# Patient Record
Sex: MALE | Race: WHITE | Employment: OTHER | URBAN - METROPOLITAN AREA
[De-identification: names, ages, dates, MRNs, and addresses within clinical notes are randomized per-mention and may not be internally consistent; named-entity substitution may affect disease eponyms.]

---

## 2017-01-03 ENCOUNTER — HOSPITAL ENCOUNTER (OUTPATIENT)
Dept: PHYSICAL THERAPY | Age: 77
Discharge: HOME | End: 2017-01-03
Payer: MEDICARE

## 2017-01-03 PROCEDURE — 97112 NEUROMUSCULAR REEDUCATION: CPT

## 2017-01-03 PROCEDURE — 97110 THERAPEUTIC EXERCISES: CPT

## 2017-01-03 NOTE — PROGRESS NOTES
PT DAILY TREATMENT NOTE - [facility]     Patient Name: Brian Moody  IBQL:[id number]  : 1940  [x]  Patient  Verified  Payor: [place] MEDICARE / Plan: [place] MEDICARE PART A & B / Product Type: Medicare /    In time:930  Out time:  Total Treatment Time (min): 33  Total Timed Codes (min): 33  1:1 Treatment Time ( only): 33   Visit #: 3 of 12    Treatment Area: Cervical myelopathy [G95.9]    SUBJECTIVE  Pain Level (0-10 scale): 4/10  Any medication changes, allergies to medications, adverse drug reactions, diagnosis change, or new procedure performed?: [x] No    [] Yes (see summary sheet for update)  Subjective functional status/changes:   [x] No changes reported      OBJECTIVE    Modality rationale:    Min Type Additional Details    [] Estim:  []Unatt       []IFC  []Premod                        []Other:  []w/ice   []w/heat  Position:  Location:    [] Estim: []Att    []TENS instruct  []NMES                    []Other:  []w/US   []w/ice   []w/heat  Position:  Location:    []  Traction: [] Cervical       []Lumbar                       [] Prone          []Supine                       []Intermittent   []Continuous Lbs:  [] before manual  [] after manual    []  Ultrasound: []Continuous   [] Pulsed                           []1MHz   []3MHz W/cm2:  Location:    []  Iontophoresis with dexamethasone         Location: [] Take home patch   [] In clinic    []  Ice     []  heat  []  Ice massage  []  Laser   []  Anodyne Position:  Location:    []  Laser with stim  []  Other:  Position:  Location:    []  Vasopneumatic Device Pressure:       [] lo [] med [] hi   Temperature: [] lo [] med [] hi   [] Skin assessment post-treatment:  []intact []redness- no adverse reaction    []redness  adverse reaction:      min []Eval                  []Re-Eval       23 min Therapeutic Exercise:  [x] See flow sheet :   Rationale: increase ROM and increase strength to improve the patients ability to improve ADL's     min Therapeutic Activity:  []  See flow sheet :        10 min Neuromuscular Re-education:  [x]  See flow sheet :standing marching, Patricioerg EO   Rationale: increase strength, improve coordination, improve balance and increase proprioception  to improve the patients ability to improve balance and safety with gait and ADL's     min Manual Therapy:          min Gait Training:  ___ feet with ___ device on level surfaces with ___ level of assist   Rationale: With   [] TE   [] TA   [] neuro   [] other: Patient Education: [x] Review HEP    [] Progressed/Changed HEP based on:   [] positioning   [] body mechanics   [] transfers   [] heat/ice application    [] other:      Other Objective/Functional Measures: HEP reviewed to promote carryover     Pain Level (0-10 scale) post treatment: 0/10    ASSESSMENT/Changes in Function: Pt ambulating with ataxic gait using rollator with decreased DF on right foot. Patient will continue to benefit from skilled PT services to modify and progress therapeutic interventions, address functional mobility deficits, address ROM deficits, address strength deficits, analyze and address soft tissue restrictions, analyze and cue movement patterns, analyze and modify body mechanics/ergonomics, assess and modify postural abnormalities, address imbalance/dizziness and instruct in home and community integration to attain remaining goals. [x]  See Plan of Care  []  See progress note/recertification  []  See Discharge Summary         Progress towards goals / Updated goals:  Short Term Goals: To be accomplished in 2 weeks:  1. Patient will be independent and compliant with HEP to achieve other goals. Status at [facility]: not independent/compliant; handout issued  Current: reviewed today    2. Patient will ambulate 100' with least restrictive AD, modified independently.   Status at [facility]: ambulates ~50' with [facility] and CG/min assist; educated on recommendation for ww  Current: pt ambulating using rollator with ataxic gait      Long Term Goals: To be accomplished in 4 weeks:  1. Improve FOTO score to >/= 59/100 to indicate decreased pain with ADL's. Status at eval: 46/100  Current: retest every 5 visits  2. Pt will increase Tena Balance assessment to 30/56 in order to demonstrate decreased risk for fall with ambulation. Status at eval: 24/56  Current: no change    3. Pt will decrease TUG to 20 seconds in order to promote decreased risk for fall with ambulation. Status at eval: 27 seconds with SPC and supervision  Current: no change       PLAN  [x]  Upgrade activities as tolerated     [x]  Continue plan of care  []  Update interventions per flow sheet       []  Discharge due to:_  [x]  Other:_Assess HEP next visit.       David Dawson, LIO 1/3/2017  9:37 AM    Future Appointments  Date Time Provider Caprice Leal   1/5/2017 3:30 PM AMADOR Stiles [facility]   1/9/2017 11:00 AM THE [facility] AMADOR MILAN [facility]   1/11/2017 1:00 PM AMADOR Stiles THE [facility]

## 2017-01-05 ENCOUNTER — HOSPITAL ENCOUNTER (OUTPATIENT)
Dept: PHYSICAL THERAPY | Age: 77
Discharge: HOME | End: 2017-01-05
Payer: MEDICARE

## 2017-01-05 PROCEDURE — 97112 NEUROMUSCULAR REEDUCATION: CPT

## 2017-01-05 PROCEDURE — 97110 THERAPEUTIC EXERCISES: CPT

## 2017-01-05 NOTE — PROGRESS NOTES
PT DAILY TREATMENT NOTE - [facility]     Patient Name: Radha Spine  Date:2017  : 1940  [x]  Patient  Verified  Payor: VA MEDICARE / Plan: VA MEDICARE PART A & B / Product Type: Medicare /    In time:3:27  Out time:4:10  Total Treatment Time (min): 43  Total Timed Codes (min): 43  1:1 Treatment Time ( [address] only): 37   Visit #: 4 of 12    Treatment Area: Cervical myelopathy [G95.9]    SUBJECTIVE  Pain Level (0-10 scale): 4/10  Any medication changes, allergies to medications, adverse drug reactions, diagnosis change, or new procedure performed?: [x] No    [] Yes (see summary sheet for update)  Subjective functional status/changes:   [] No changes reported  The pt reports that he is having more difficulty walking around today than usual.    OBJECTIVE    Modality rationale:    Min Type Additional Details    [] Estim:  []Unatt       []IFC  []Premod                        []Other:  []w/ice   []w/heat  Position:  Location:    [] Estim: []Att    []TENS instruct  []NMES                    []Other:  []w/US   []w/ice   []w/heat  Position:  Location:    []  Traction: [] Cervical       []Lumbar                       [] Prone          []Supine                       []Intermittent   []Continuous Lbs:  [] before manual  [] after manual    []  Ultrasound: []Continuous   [] Pulsed                           []1MHz   []3MHz W/cm2:  Location:    []  Iontophoresis with dexamethasone         Location: [] Take home patch   [] In clinic    []  Ice     []  heat  []  Ice massage  []  Laser   []  Anodyne Position:  Location:    []  Laser with stim  []  Other:  Position:  Location:    []  Vasopneumatic Device Pressure:       [] lo [] med [] hi   Temperature: [] lo [] med [] hi   [] Skin assessment post-treatment:  []intact []redness- no adverse reaction    []redness  adverse reaction:      min []Eval                  []Re-Eval       33 min Therapeutic Exercise:  [x] See flow sheet :   Rationale: increase ROM and increase strength to improve the patients ability to walk     min Therapeutic Activity:  []  See flow sheet :   Rationale:      10 min Neuromuscular Re-education:  [x]  See flow sheet :   Rationale: improve coordination, improve balance and increase proprioception  to improve the patients ability to stand and walk safely     min Manual Therapy:     Rationale:      min Gait Training:  ___ feet with ___ device on level surfaces with ___ level of assist   Rationale: With   [] TE   [] TA   [] neuro   [] other: Patient Education: [x] Review HEP    [] Progressed/Changed HEP based on:   [] positioning   [] body mechanics   [] transfers   [] heat/ice application    [] other:      Other Objective/Functional Measures:      Pain Level (0-10 scale) post treatment: 3-4/10    ASSESSMENT/Changes in Function: The pt didn't get any pain relief from exercises but nothing increased his pain either. The pt was safe when moving around today but has difficulty controlling his leg movements and has a slow speed. He should continue to work on strengthening and movement control. Patient will continue to benefit from skilled PT services to modify and progress therapeutic interventions, address functional mobility deficits, address ROM deficits, address strength deficits, analyze and address soft tissue restrictions, analyze and cue movement patterns, analyze and modify body mechanics/ergonomics, assess and modify postural abnormalities, address imbalance/dizziness and instruct in home and community integration to attain remaining goals.      [x]  See Plan of Care  []  See progress note/recertification  []  See Discharge Summary         PLAN  [x]  Upgrade activities as tolerated     [x]  Continue plan of care  []  Update interventions per flow sheet       []  Discharge due to:_  []  Other:_      Michael Chen, PT 1/5/2017  3:34 PM    Future Appointments  Date Time Provider Caprice Leal   1/9/2017 11:00 AM Edgardo Cook[facility]   1/11/2017 1:00 PM Arsenio Gonsalez [facility]   1/16/2017 11:30 AM [facility]OR Gonsalez [facility]   1/19/2017 10:00 AM AMADOR Gonsalez [facility]

## 2017-01-09 ENCOUNTER — APPOINTMENT (OUTPATIENT)
Dept: PHYSICAL THERAPY | Age: 77
End: 2017-01-09
Payer: MEDICARE

## 2017-01-11 ENCOUNTER — HOSPITAL ENCOUNTER (OUTPATIENT)
Dept: PHYSICAL THERAPY | Age: 77
Discharge: HOME | End: 2017-01-11
Payer: MEDICARE

## 2017-01-11 PROCEDURE — 97110 THERAPEUTIC EXERCISES: CPT

## 2017-01-11 PROCEDURE — 97112 NEUROMUSCULAR REEDUCATION: CPT

## 2017-01-11 NOTE — PROGRESS NOTES
PT DAILY TREATMENT NOTE - [facility]     Patient Name: Malinda Anaya  Date:2017  : 1940  [x]  Patient  Verified  Payor: VA MEDICARE / Plan: VA MEDICARE PART A & B / Product Type: Medicare /    In time:12:57  Out time:1:44  Total Treatment Time (min): 47  Total Timed Codes (min): 47  1:1 Treatment Time ([facility] only): 52   Visit #: 5 of 12    Treatment Area: Cervical myelopathy [G95.9]    SUBJECTIVE  Pain Level (0-10 scale): 310  Any medication changes, allergies to medications, adverse drug reactions, diagnosis change, or new procedure performed?: [x] No    [] Yes (see summary sheet for update)  Subjective functional status/changes:   [x] No changes reported      OBJECTIVE    Modality rationale:    Min Type Additional Details    [] Estim:  []Unatt       []IFC  []Premod                        []Other:  []w/ice   []w/heat  Position:  Location:    [] Estim: []Att    []TENS instruct  []NMES                    []Other:  []w/US   []w/ice   []w/heat  Position:  Location:    []  Traction: [] Cervical       []Lumbar                       [] Prone          []Supine                       []Intermittent   []Continuous Lbs:  [] before manual  [] after manual    []  Ultrasound: []Continuous   [] Pulsed                           []1MHz   []3MHz W/cm2:  Location:    []  Iontophoresis with dexamethasone         Location: [] Take home patch   [] In clinic    []  Ice     []  heat  []  Ice massage  []  Laser   []  Anodyne Position:  Location:    []  Laser with stim  []  Other:  Position:  Location:    []  Vasopneumatic Device Pressure:       [] lo [] med [] hi   Temperature: [] lo [] med [] hi   [] Skin assessment post-treatment:  []intact []redness- no adverse reaction    []redness  adverse reaction:      min []Eval                  []Re-Eval       26 min Therapeutic Exercise:  [x] See flow sheet :   Rationale: increase ROM and increase strength to improve the patients ability to walk     min Therapeutic Activity:  [] See flow sheet :   Rationale:      13 min Neuromuscular Re-education:  [x]  See flow sheet :   Rationale: improve coordination, improve balance and increase proprioception  to improve the patients ability to stand and walk     min Manual Therapy:     Rationale:      min Gait Training:  ___ feet with ___ device on level surfaces with ___ level of assist   Rationale: With   [] TE   [] TA   [] neuro   [] other: Patient Education: [x] Review HEP    [] Progressed/Changed HEP based on:   [] positioning   [] body mechanics   [] transfers   [] heat/ice application    [] other:      Other Objective/Functional Measures: see goal review     Pain Level (0-10 scale) post treatment: 2/10    ASSESSMENT/Changes in Function: The pt did not do as well with the TUG today as he did initially. He still has significant difficulty with gait due to weakness, decreased balance and ataxia. Patient will continue to benefit from skilled PT services to modify and progress therapeutic interventions, address functional mobility deficits, address ROM deficits, address strength deficits, analyze and address soft tissue restrictions, analyze and cue movement patterns, analyze and modify body mechanics/ergonomics, assess and modify postural abnormalities, address imbalance/dizziness and instruct in home and community integration to attain remaining goals. [x]  See Plan of Care  []  See progress note/recertification  []  See Discharge Summary         Progress towards goals / Updated goals:  Short Term Goals: To be accomplished in 2 weeks:  1. Patient will be independent and compliant with HEP to achieve other goals. Status at [facility]: not independent/compliant; handout issued  Current: reviewed today     2. Patient will ambulate 100' with least restrictive AD, modified independently.   Status at [facility]: ambulates ~50' with [facility] and CG/min assist; educated on recommendation for ww  Current: Not met, pt still using rollator for most mobility      Long Term Goals: To be accomplished in 4 weeks:  1. Improve FOTO score to >/= 59/100 to indicate decreased pain with ADL's. Status at eval: 46/100  Current: Not met, 39    2. Pt will increase Tena Balance assessment to 30/56 in order to demonstrate decreased risk for fall with ambulation. Status at eval: 24/56  Current: not tested     3. Pt will decrease TUG to 20 seconds in order to promote decreased risk for fall with ambulation.   Status at eval: 27 seconds with SPC and supervision  Current: 37 seconds with SPC and supervision    PLAN  [x]  Upgrade activities as tolerated     [x]  Continue plan of care  []  Update interventions per flow sheet       []  Discharge due to:_  []  Other:_      Fabienne Garcia, PT 1/11/2017  1:32 PM    Future Appointments  Date Time Provider Caprice Leal   1/12/2017 2:00 PM Marii Centeno, PTA [facility]   1/16/2017 11:30 AM Fabienne Garcia, PT [facility]   1/19/2017 10:00 AM Fabienne Garcia, PT [facility]

## 2017-01-12 ENCOUNTER — HOSPITAL ENCOUNTER (OUTPATIENT)
Dept: PHYSICAL THERAPY | Age: 77
Discharge: HOME | End: 2017-01-12
Payer: MEDICARE

## 2017-01-12 PROCEDURE — 97110 THERAPEUTIC EXERCISES: CPT

## 2017-01-12 PROCEDURE — 97112 NEUROMUSCULAR REEDUCATION: CPT

## 2017-01-12 NOTE — PROGRESS NOTES
PT DAILY TREATMENT NOTE - [facility]     Patient Name: Mary Ann Madrigal  Date:2017  : 1940  [x]  Patient  Verified  Payor: VA MEDICARE / Plan: VA MEDICARE PART A & B / Product Type: Medicare /    In time:1400  Out time:1443  Total Treatment Time (min): 43  Total Timed Codes (min): 43  1:1 Treatment Time ( [address] only): 37   Visit #: 6 of 12    Treatment Area: Cervical myelopathy [G95.9]    SUBJECTIVE  Pain Level (0-10 scale): 3/10 right leg  Any medication changes, allergies to medications, adverse drug reactions, diagnosis change, or new procedure performed?: [x] No    [] Yes (see summary sheet for update)  Subjective functional status/changes:   [x] No changes reported      OBJECTIVE    Modality rationale:    Min Type Additional Details    [] Estim:  []Unatt       []IFC  []Premod                        []Other:  []w/ice   []w/heat  Position:  Location:    [] Estim: []Att    []TENS instruct  []NMES                    []Other:  []w/US   []w/ice   []w/heat  Position:  Location:    []  Traction: [] Cervical       []Lumbar                       [] Prone          []Supine                       []Intermittent   []Continuous Lbs:  [] before manual  [] after manual    []  Ultrasound: []Continuous   [] Pulsed                           []1MHz   []3MHz W/cm2:  Location:    []  Iontophoresis with dexamethasone         Location: [] Take home patch   [] In clinic    []  Ice     []  heat  []  Ice massage  []  Laser   []  Anodyne Position:  Location:    []  Laser with stim  []  Other:  Position:  Location:    []  Vasopneumatic Device Pressure:       [] lo [] med [] hi   Temperature: [] lo [] med [] hi   [] Skin assessment post-treatment:  []intact []redness- no adverse reaction    []redness  adverse reaction:      min []Eval                  []Re-Eval       31 min Therapeutic Exercise:  [x] See flow sheet :   Rationale: increase ROM and increase strength to improve the patients ability to improve gait and ADL's     min Therapeutic Activity:  []  See flow sheet :        12 min Neuromuscular Re-education:  [x]  See flow sheet :static and dynamic balance   Rationale: increase strength, improve coordination, improve balance and increase proprioception  to improve the patients ability to improve balance and safety with gait and ADL's     min Manual Therapy:          min Gait Training:  ___ feet with ___ device on level surfaces with ___ level of assist   Rationale: With   [] TE   [] TA   [] neuro   [] other: Patient Education: [x] Review HEP    [] Progressed/Changed HEP based on:   [] positioning   [] body mechanics   [] transfers   [] heat/ice application    [] other:      Other Objective/Functional Measures:      Pain Level (0-10 scale) post treatment: 0/10    ASSESSMENT/Changes in Function: Pt ambulating using rollator with limited DF on right foot. DF does improve with cueing in clinic. Patient will continue to benefit from skilled PT services to modify and progress therapeutic interventions, address functional mobility deficits, address ROM deficits, address strength deficits, analyze and address soft tissue restrictions, analyze and cue movement patterns, analyze and modify body mechanics/ergonomics, assess and modify postural abnormalities, address imbalance/dizziness and instruct in home and community integration to attain remaining goals.      [x]  See Plan of Care  []  See progress note/recertification  []  See Discharge Summary           PLAN  [x]  Upgrade activities as tolerated     [x]  Continue plan of care  []  Update interventions per flow sheet       []  Discharge due to:_  []  Other:_      Flor Mcelroy PTA 1/12/2017  1:59 PM    Future Appointments  Date Time Provider Caprice Leal   1/12/2017 2:00 PM Flor Mcelroy PTA [facility]   1/16/2017 11:30 AM AMADOR Carroll [facility]   1/19/2017 10:00 AM AMADOR Carroll [facility]

## 2017-01-16 ENCOUNTER — HOSPITAL ENCOUNTER (OUTPATIENT)
Dept: PHYSICAL THERAPY | Age: 77
Discharge: HOME | End: 2017-01-16
Payer: MEDICARE

## 2017-01-16 PROCEDURE — 97110 THERAPEUTIC EXERCISES: CPT

## 2017-01-16 PROCEDURE — 97112 NEUROMUSCULAR REEDUCATION: CPT

## 2017-01-16 NOTE — PROGRESS NOTES
PT DAILY TREATMENT NOTE - [facility]     Patient Name: Mer Kauffman  Date:2017  : 1940  [x]  Patient  Verified  Payor: Bhargav English / Plan: [place] MEDICARE PART A & B / Product Type: Medicare /    In time:1050  Out time:1130  Total Treatment Time (min): 40  Total Timed Codes (min): 40  1:1 Treatment Time ([address] only): 40   Visit #: 7 of 12    Treatment Area: Cervical myelopathy [G95.9]    SUBJECTIVE  Pain Level (0-10 scale): 810  Any medication changes, allergies to medications, adverse drug reactions, diagnosis change, or new procedure performed?: [x] No    [] Yes (see summary sheet for update)  Subjective functional status/changes:   [] No changes reported  Terrible today. Hurts in my hips.     OBJECTIVE    Modality rationale:    Min Type Additional Details    [] Estim:  []Unatt       []IFC  []Premod                        []Other:  []w/ice   []w/heat  Position:  Location:    [] Estim: []Att    []TENS instruct  []NMES                    []Other:  []w/US   []w/ice   []w/heat  Position:  Location:    []  Traction: [] Cervical       []Lumbar                       [] Prone          []Supine                       []Intermittent   []Continuous Lbs:  [] before manual  [] after manual    []  Ultrasound: []Continuous   [] Pulsed                           []1MHz   []3MHz W/cm2:  Location:    []  Iontophoresis with dexamethasone         Location: [] Take home patch   [] In clinic    []  Ice     []  heat  []  Ice massage  []  Laser   []  Anodyne Position:  Location:    []  Laser with stim  []  Other:  Position:  Location:    []  Vasopneumatic Device Pressure:       [] lo [] med [] hi   Temperature: [] lo [] med [] hi   [] Skin assessment post-treatment:  []intact []redness- no adverse reaction    []redness  adverse reaction:      min []Eval                  []Re-Eval       30 min Therapeutic Exercise:  [x] See flow sheet :   Rationale: increase ROM and increase strength to improve the patients ability to normalize ADL's     min Therapeutic Activity:  []  See flow sheet :        10 min Neuromuscular Re-education:  [x]  See flow sheet :   Rationale: improve coordination, improve balance and increase proprioception  to improve the patients ability to normalize gait and decrease fall risk     min Manual Therapy:          min Gait Training:  ___ feet with ___ device on level surfaces with ___ level of assist   Rationale: With   [] TE   [] TA   [] neuro   [] other: Patient Education: [x] Review HEP    [] Progressed/Changed HEP based on:   [] positioning   [] body mechanics   [] transfers   [] heat/ice application    [] other:      Other Objective/Functional Measures: none taken today     Pain Level (0-10 scale) post treatment: 3/10    ASSESSMENT/Changes in Function: Pt unable to maintain Rhomberg position with EC for any length of time. He loses his balance posteriorly requiring assist to correct. Patient will continue to benefit from skilled PT services to modify and progress therapeutic interventions, address functional mobility deficits, address ROM deficits, address strength deficits, analyze and address soft tissue restrictions, analyze and cue movement patterns, analyze and modify body mechanics/ergonomics, assess and modify postural abnormalities and address imbalance/dizziness to attain remaining goals.      [x]  See Plan of Care  []  See progress note/recertification  []  See Discharge Summary          PLAN  [x]  Upgrade activities as tolerated     [x]  Continue plan of care  []  Update interventions per flow sheet       []  Discharge due to:_  [x]  Other:progress note next visit to continue PT    Teresa Dias PT 1/16/2017  11:59 AM    Future Appointments  Date Time Provider Caprice Leal   1/16/2017 12:00 PM AMADOR [facility]   1/19/2017 10:00 AM AMADOR Zuñiga [facility]

## 2017-01-19 ENCOUNTER — HOSPITAL ENCOUNTER (OUTPATIENT)
Dept: PHYSICAL THERAPY | Age: 77
Discharge: HOME | End: 2017-01-19
Payer: MEDICARE

## 2017-01-19 PROCEDURE — 97110 THERAPEUTIC EXERCISES: CPT

## 2017-01-19 PROCEDURE — G8978 MOBILITY CURRENT STATUS: HCPCS

## 2017-01-19 PROCEDURE — G8979 MOBILITY GOAL STATUS: HCPCS

## 2017-01-19 PROCEDURE — 97112 NEUROMUSCULAR REEDUCATION: CPT

## 2017-01-19 NOTE — PROGRESS NOTES
[facility] at [address] [place] [address]  Phone: [phone]   Fax: [phone]    Continued Plan of Care/ Re-certification for Physical Therapy Services    Patient name: Katlyn Allison Start of Care: 2016   Referral source: Kevin Alfonso MD : 1940   Medical/Treatment Diagnosis: Cervical myelopathy [G95.9] Onset Date:Summer 2016     Prior Hospitalization: see medical history Provider#: [id number]   Medications: Verified on Patient Summary List    Comorbidities: TIA's [id number], arthritis, HTN, GERD, lumbar stenosis L4-L5-S1; L TKA   Prior Level of Function: independent ambulation with SPC, chronic weakness related to previous TIA's and L TKA limiting long distances. Visits from Start of Care: 8    Missed Visits: 0    The Plan of Care and following information is based on the patient's current status:  Short Term Goals: To be accomplished in 2 weeks:  1. Patient will be independent and compliant with HEP to achieve other goals. Status at eval: not independent/compliant; handout issued  Current: Met      2. Patient will ambulate 100' with least restrictive AD, modified independently. Status at eval: ambulates ~50' with SPC and CG/min assist; educated on recommendation for ww  Current: Not met, pt still using rollator for most mobility      Long Term Goals: To be accomplished in 4 weeks:  1. Improve FOTO score to >/= 59/100 to indicate decreased pain with ADL's. Status at eval: 46/100  Current: Not met, 39     2. Pt will increase Tena Balance assessment to 30/56 in order to demonstrate decreased risk for fall with ambulation. Status at eval: 24/56  Current: Met, 30/56      3. Pt will decrease TUG to 20 seconds in order to promote decreased risk for fall with ambulation.   Status at eval: 27 seconds with SPC and supervision  Current: 31 seconds with SPC and supervision    Key functional changes: pt is able to lift his foot a little bit better, he hasn't stumbled lately      Problems/ barriers to goal attainment: multiple comorbidities     Problem List: pain affecting function, decrease ROM, decrease strength, impaired gait/ balance, decrease ADL/ functional abilitiies, decrease activity tolerance, decrease flexibility/ joint mobility and decrease transfer abilities    Treatment Plan: Therapeutic exercise, Therapeutic activities, Neuromuscular re-education, Physical agent/modality, Gait/balance training, Manual therapy, Aquatic therapy, Patient education and Functional mobility training     Patient Goal (s) has been updated and includes: \"get that foot up when walking\"     Goals for this certification period to be accomplished in 8 treatments:  Improve FOTO score to >/= 59/100 to indicate decreased pain with ADL's. Status at eval: 66/546  Status at recert: Not met, 39     Pt will decrease TUG to 20 seconds in order to promote decreased risk for fall with ambulation. Status at eval: 27 seconds with SPC and supervision  Status at recert: 31 seconds with SPC and supervision    The pt will be able to maintain modified tandem stance bilaterally for at least 15 seconds to improve his safety with bathing and household chores. Status at eval: unable to hold  Status at recert: only able to maintain for 5 seconds    Frequency / Duration: Patient to be seen 2 times per week for 4 weeks:    Assessment / Recommendations: The pt still struggles with mobility tasks due to weakness, ataxia and decreased balance. He has made some gains with balance since starting therapy though as demonstrated by his improved Tena Balance Score. He would benefit from continued therapy to further strengthening activities and to progress balance activities to improve his safety with mobility tasks and movement patterns for ADLs.     G-Codes (GP)  Mobility  [id number] Current  CL= 60-79%  [id number] Goal  CK= 40-59%    The severity rating is based on clinical judgment and the [facility] score.    Certification Period: 1/19/2017 to 2/17/2017    Elizabeth Rubi, PT 1/19/2017 10:17 AM    ________________________________________________________________________  I certify that the above Therapy Services are being furnished while the patient is under my care. I agree with the treatment plan and certify that this therapy is necessary. [] I have read the above and request that my patient continue as recommended.   [] I have read the above report and request that my patient continue therapy with the following changes/special instructions: ______________________________________  [] I have read the above report and request that my patient be discharged from therapy    Physician's Signature:_______________________________Date:___________Time:__________    Please sign and return to   [facility] at [address]  Phone: [phone]   Fax: [phone]

## 2017-01-19 NOTE — PROGRESS NOTES
PT DAILY TREATMENT NOTE - [facility]     Patient Name: Jayde Abreu  Date:2017  : 1940  [x]  Patient  Verified  Payor: Suzanna Mcadams / Plan: [place] MED[place] / Product Type: Medicare /    In time:10:00  Out time:10:48  Total Treatment Time (min): 48  Total Timed Codes (min): 48  1:1 Treatment Time ( [address] only): 45   Visit #: 8 of 12    Treatment Area: Cervical myelopathy [G95.9]    SUBJECTIVE  Pain Level (0-10 scale): 6/10  Any medication changes, allergies to medications, adverse drug reactions, diagnosis change, or new procedure performed?: [x] No    [] Yes (see summary sheet for update)  Subjective functional status/changes:   [] No changes reported  The pt reports that he does think he can lift his leg a little better now than when he started therapy.     OBJECTIVE    Modality rationale:    Min Type Additional Details    [] Estim:  []Unatt       []IFC  []Premod                        []Other:  []w/ice   []w/heat  Position:  Location:    [] Estim: []Att    []TENS instruct  []NMES                    []Other:  []w/US   []w/ice   []w/heat  Position:  Location:    []  Traction: [] Cervical       []Lumbar                       [] Prone          []Supine                       []Intermittent   []Continuous Lbs:  [] before manual  [] after manual    []  Ultrasound: []Continuous   [] Pulsed                           []1MHz   []3MHz W/cm2:  Location:    []  Iontophoresis with dexamethasone         Location: [] Take home patch   [] In clinic    []  Ice     []  heat  []  Ice massage  []  Laser   []  Anodyne Position:  Location:    []  Laser with stim  []  Other:  Position:  Location:    []  Vasopneumatic Device Pressure:       [] lo [] med [] hi   Temperature: [] lo [] med [] hi   [] Skin assessment post-treatment:  []intact []redness- no adverse reaction    []redness  adverse reaction:      min []Eval                  []Re-Eval       15 min Therapeutic Exercise:  [x] See flow sheet :   Rationale: increase ROM and increase strength to improve the patients ability to walk     min Therapeutic Activity:  []  See flow sheet :   Rationale:      33 min Neuromuscular Re-education:  [x]  See flow sheet : TUG and Tena balance retesting   Rationale: improve coordination, improve balance and increase proprioception  to improve the patients ability to safely perform mobility tasks     min Manual Therapy:     Rationale:      min Gait Training:  ___ feet with ___ device on level surfaces with ___ level of assist   Rationale: With   [] TE   [] TA   [] neuro   [] other: Patient Education: [x] Review HEP    [] Progressed/Changed HEP based on:   [] positioning   [] body mechanics   [] transfers   [] heat/ice application    [] other:      Other Objective/Functional Measures: see recert     Pain Level (0-10 scale) post treatment: 3/10    ASSESSMENT/Changes in Function: see recert    Patient will continue to benefit from skilled PT services to modify and progress therapeutic interventions, address functional mobility deficits, address ROM deficits, address strength deficits, analyze and address soft tissue restrictions, analyze and cue movement patterns, analyze and modify body mechanics/ergonomics, assess and modify postural abnormalities, address imbalance/dizziness and instruct in home and community integration to attain remaining goals. []  See Plan of Care  [x]  See progress note/recertification  []  See Discharge Summary         Progress towards goals / Updated goals:  See recert    PLAN  [x]  Upgrade activities as tolerated     [x]  Continue plan of care  []  Update interventions per flow sheet       []  Discharge due to:_  []  Other:_      Freddie Sauceda, PT 1/19/2017  10:16 AM    No future appointments.

## 2017-01-24 ENCOUNTER — HOSPITAL ENCOUNTER (OUTPATIENT)
Dept: PHYSICAL THERAPY | Age: 77
Discharge: HOME | End: 2017-01-24
Payer: MEDICARE

## 2017-01-24 PROCEDURE — 97110 THERAPEUTIC EXERCISES: CPT

## 2017-01-24 NOTE — PROGRESS NOTES
PT DAILY TREATMENT NOTE - [facility]     Patient Name: Krystal De Guzman  Date:2017  : 1940  [x]  Patient  Verified  Payor: Rama Beth / Plan: [place] MEDICARE PART A & B / Product Type: Medicare /    In time:1:27  Out time:2:05  Total Treatment Time (min): 38  Total Timed Codes (min): 38  1:1 Treatment Time ( [address] only): 25  Visit #: 9 of 16    Treatment Area: Cervical myelopathy [G95.9]    SUBJECTIVE  Pain Level (0-10 scale): 5  Any medication changes, allergies to medications, adverse drug reactions, diagnosis change, or new procedure performed?: [x] No    [] Yes (see summary sheet for update)  Subjective functional status/changes:   [] No changes reported  No changes reported today. OBJECTIVE    36 min Therapeutic Exercise:  [x] See flow sheet :   Rationale: increase ROM and increase strength to improve the patients ability to tolerate ADLs     2 min Neuromuscular Re-education:  [x]  See flow sheet : balance activities   Rationale: increase strength, improve coordination, improve balance and increase proprioception  to improve the patients ability to ambulate more safely        With   [] TE   [] TA   [] neuro   [] other: Patient Education: [x] Review HEP    [] Progressed/Changed HEP based on:   [] positioning   [] body mechanics   [] transfers   [] heat/ice application    [] other:      Other Objective/Functional Measures: Performed HR/TR in standing, added yellow tband to standing hip flex and ABD, added 2# weight for LAQ on B LEs to improve strength in the LEs. Challenged with TR in standing secondary to weakness in the LEs and pain in the low back. CGA needed for rhomberg EO on floor. CGA-min assist needed for rhomberg EC on floor and needed min assist from therapist at times to regain balance during the activity. SBA needed for all other standing exercises. Pain Level (0-10 scale) post treatment: 4    ASSESSMENT/Changes in Function: Forward trunk lean noted with standing exercises today.  Mild decrease in pain reported at the end of the session. Pt knocked over cameron several times with both feet during step over cameron exercise secondary to noted decreased hip and knee flex ROM during the activity (also used 2 UE support during activity). Pt used B UEs from plinth to help with sit to/from stand transfer from the plinth. Continue POC as tolerated. Patient will continue to benefit from skilled PT services to modify and progress therapeutic interventions, address functional mobility deficits, address ROM deficits, address strength deficits, analyze and address soft tissue restrictions, analyze and cue movement patterns, analyze and modify body mechanics/ergonomics, address imbalance/dizziness and instruct in home and community integration to attain remaining goals. []  See Plan of Care  []  See progress note/recertification  []  See Discharge Summary         Progress towards goals / Updated goals:  Goals for this certification period to be accomplished in 8 treatments:  Improve FOTO score to >/= 59/100 to indicate decreased pain with ADL's. Status at eval: 88/233  Status at recert: Not met, 39      Pt will decrease TUG to 20 seconds in order to promote decreased risk for fall with ambulation. Status at eval: 27 seconds with SPC and supervision  Status at recert: 31 seconds with SPC and supervision     The pt will be able to maintain modified tandem stance bilaterally for at least 15 seconds to improve his safety with bathing and household chores.   Status at eval: unable to hold  Status at recert: only able to maintain for 5 seconds    PLAN  [x]  Upgrade activities as tolerated     [x]  Continue plan of care  [x]  Update interventions per flow sheet       []  Discharge due to:_  []  Other:_      Ran Chavez PT 1/24/2017  1:50 PM    Future Appointments  Date Time Provider Caprice Leal   1/24/2017 1:30 PM AMADOR Torres [facility]   1/27/2017 10:00 AM AMADOR Kelsey [facility] 2/1/2017 11:00 AM Emily Pan PT [facility]   2/3/2017 10:30 AM Aniyah Benavides PT [facility]   2/6/2017 12:00 PM Eimly Pan, PT [facility]   2/8/2017 1:30 PM Sergey Rubi PT [facility]   2/13/2017 10:30 AM Aniyah Benavides PT [facility]   2/15/2017 1:00 PM Jose M Cardenas PT [facility]

## 2017-01-27 ENCOUNTER — HOSPITAL ENCOUNTER (OUTPATIENT)
Dept: PHYSICAL THERAPY | Age: 77
Discharge: HOME | End: 2017-01-27
Payer: MEDICARE

## 2017-01-27 PROCEDURE — 97110 THERAPEUTIC EXERCISES: CPT

## 2017-01-27 NOTE — PROGRESS NOTES
PT DAILY TREATMENT NOTE - [facility]     Patient Name: Emily Reddy  Date:2017  : 1940  [x]  Patient  Verified  Payor: Leon Mustafa / Plan: [place] MEDICARE PART A & B / Product Type: Medicare /    In time:1000  Out time:1043  Total Treatment Time (min): 43  Total Timed Codes (min): 43  1:1 Treatment Time ( [address] only): 37   Visit #: 10 of 16    Treatment Area: Cervical myelopathy [G95.9]    SUBJECTIVE  Pain Level (0-10 scale): 2/10  Any medication changes, allergies to medications, adverse drug reactions, diagnosis change, or new procedure performed?: [x] No    [] Yes (see summary sheet for update)  Subjective functional status/changes:   [] No changes reported  My hips are tight.     OBJECTIVE    Modality rationale:    Min Type Additional Details    [] Estim:  []Unatt       []IFC  []Premod                        []Other:  []w/ice   []w/heat  Position:  Location:    [] Estim: []Att    []TENS instruct  []NMES                    []Other:  []w/US   []w/ice   []w/heat  Position:  Location:    []  Traction: [] Cervical       []Lumbar                       [] Prone          []Supine                       []Intermittent   []Continuous Lbs:  [] before manual  [] after manual    []  Ultrasound: []Continuous   [] Pulsed                           []1MHz   []3MHz W/cm2:  Location:    []  Iontophoresis with dexamethasone         Location: [] Take home patch   [] In clinic    []  Ice     []  heat  []  Ice massage  []  Laser   []  Anodyne Position:  Location:    []  Laser with stim  []  Other:  Position:  Location:    []  Vasopneumatic Device Pressure:       [] lo [] med [] hi   Temperature: [] lo [] med [] hi   [] Skin assessment post-treatment:  []intact []redness- no adverse reaction    []redness  adverse reaction:      min []Eval                  []Re-Eval       43 min Therapeutic Exercise:  [x] See flow sheet :   Rationale: increase strength, improve coordination, improve balance and increase proprioception to improve the patients ability to normalize gait and function     min Therapeutic Activity:  []  See flow sheet :         min Neuromuscular Re-education:  [x]  See flow sheet :        min Manual Therapy:          min Gait Training:  ___ feet with ___ device on level surfaces with ___ level of assist   Rationale: With   [] TE   [] TA   [] neuro   [] other: Patient Education: [x] Review HEP    [] Progressed/Changed HEP based on:   [] positioning   [] body mechanics   [] transfers   [] heat/ice application    [] other:      Other Objective/Functional Measures:   FOTO score: 35/100  TUG score: 28 sec with 4WW    Pain Level (0-10 scale) post treatment: 0/10    ASSESSMENT/Changes in Function: FOTO score continues to regress. No improvement in TUG score. Static standing balance (modified tandem) slowly improving. Patient will continue to benefit from skilled PT services to modify and progress therapeutic interventions, address functional mobility deficits, address ROM deficits, address strength deficits, analyze and address soft tissue restrictions, analyze and cue movement patterns, assess and modify postural abnormalities and address imbalance/dizziness to attain remaining goals. []  See Plan of Care  []  See progress note/recertification  []  See Discharge Summary         Progress towards goals / Updated goals:  Goals for this certification period to be accomplished in 8 treatments:  Improve FOTO score to >/= 59/100 to indicate decreased pain with ADL's. Status at eval: 04/742  Status at recert: Not met, 39  Current Status: not met: 35/100      Pt will decrease TUG to 20 seconds in order to promote decreased risk for fall with ambulation.   Status at eval: 27 seconds with SPC and supervision  Status at recert: 31 seconds with SPC and supervision  Current Status: not met: 28 seconds with 4WW and supervision      The pt will be able to maintain modified tandem stance bilaterally for at least 15 seconds to improve his safety with bathing and household chores.   Status at eval: unable to hold  Status at recert: only able to maintain for 5 seconds  Current Status: progressing: 10 seconds bilaterally    PLAN  [x]  Upgrade activities as tolerated     [x]  Continue plan of care  []  Update interventions per flow sheet       []  Discharge due to:_  []  Other:_      Elizabeth Alvarez PT 1/27/2017  9:58 AM    Future Appointments  Date Time Provider Caprice Leal   1/27/2017 10:00 AM AMADOR Stokes [facility]   2/1/2017 11:00 AM Elizabeth Alvarez PT [facility]   2/3/2017 10:30 AM AMADOR Stokes [facility]   2/6/2017 12:00 PM Elizabeth Alvarez PT [facility]   2/8/2017 1:30 PM AMADOR Alas [facility]   2/13/2017 10:30 AM AMADOR StokesHPANGELINE [facility]   2/15/2017 1:00 PM AMADOR WilkinsonHPANGELINE [facility]

## 2017-02-01 ENCOUNTER — APPOINTMENT (OUTPATIENT)
Dept: PHYSICAL THERAPY | Age: 77
End: 2017-02-01
Payer: MEDICARE

## 2017-02-03 ENCOUNTER — HOSPITAL ENCOUNTER (OUTPATIENT)
Dept: PHYSICAL THERAPY | Age: 77
Discharge: HOME | End: 2017-02-03
Payer: MEDICARE

## 2017-02-03 PROCEDURE — 97110 THERAPEUTIC EXERCISES: CPT

## 2017-02-03 PROCEDURE — G8979 MOBILITY GOAL STATUS: HCPCS

## 2017-02-03 PROCEDURE — G8980 MOBILITY D/C STATUS: HCPCS

## 2017-02-03 NOTE — PROGRESS NOTES
PT DAILY TREATMENT NOTE - [facility]     Patient Name: Mathieu Hogan  Date:2/3/2017  : 1940  [x]  Patient  Verified  Payor: [place] MEDICARE / Plan: [place] MEDICARE PART A & B / Product Type: Medicare /    In time:1025  Out time:1100  Total Treatment Time (min): 35  Total Timed Codes (min): 35  1:1 Treatment Time ( [address] only): 25   Visit #: 11 of 16    Treatment Area: Cervical myelopathy [G95.9]    SUBJECTIVE  Pain Level (0-10 scale): 3/10  Any medication changes, allergies to medications, adverse drug reactions, diagnosis change, or new procedure performed?: [x] No    [] Yes (see summary sheet for update)  Subjective functional status/changes:   [] No changes reported  My hips are hurting me. My doctor is going to send me to another orthopedic for a second opinion. I don't care for the one I have.     OBJECTIVE    Modality rationale:    Min Type Additional Details    [] Estim:  []Unatt       []IFC  []Premod                        []Other:  []w/ice   []w/heat  Position:  Location:    [] Estim: []Att    []TENS instruct  []NMES                    []Other:  []w/US   []w/ice   []w/heat  Position:  Location:    []  Traction: [] Cervical       []Lumbar                       [] Prone          []Supine                       []Intermittent   []Continuous Lbs:  [] before manual  [] after manual    []  Ultrasound: []Continuous   [] Pulsed                           []1MHz   []3MHz W/cm2:  Location:    []  Iontophoresis with dexamethasone         Location: [] Take home patch   [] In clinic    []  Ice     []  heat  []  Ice massage  []  Laser   []  Anodyne Position:  Location:    []  Laser with stim  []  Other:  Position:  Location:    []  Vasopneumatic Device Pressure:       [] lo [] med [] hi   Temperature: [] lo [] med [] hi   [] Skin assessment post-treatment:  []intact []redness- no adverse reaction    []redness  adverse reaction:      min []Eval                  []Re-Eval       35 min Therapeutic Exercise:  [x] See flow sheet :   Rationale: increase ROM, increase strength, improve balance and increase proprioception to improve the patients ability to normalize gait and function     min Therapeutic Activity:  []  See flow sheet :         min Neuromuscular Re-education:  []  See flow sheet :        min Manual Therapy:          min Gait Training:  ___ feet with ___ device on level surfaces with ___ level of assist   Rationale: With   [] TE   [] TA   [] neuro   [] other: Patient Education: [x] Review HEP    [] Progressed/Changed HEP based on:   [] positioning   [] body mechanics   [] transfers   [] heat/ice application    [] other:      Other Objective/Functional Measures: see goal review     Pain Level (0-10 scale) post treatment: 1/10    ASSESSMENT/Changes in Function: Pt continues to c/o bilateral hip pain with standing activity. Pain at least in part could be coming from his back (spinal stenosis). He is to be scheduled for a second opinion. Patient will continue to benefit from skilled PT services to modify and progress therapeutic interventions, address functional mobility deficits, address ROM deficits, address strength deficits, analyze and address soft tissue restrictions, analyze and cue movement patterns, analyze and modify body mechanics/ergonomics, assess and modify postural abnormalities and address imbalance/dizziness to attain remaining goals. []  See Plan of Care  []  See progress note/recertification  []  See Discharge Summary    Goals for this certification period to be accomplished in 8 treatments:  Improve FOTO score to >/= 59/100 to indicate decreased pain with ADL's. Status at eval: 48/582  Status at recert: Not met, 39  Current Status: not met: 35/100      Pt will decrease TUG to 20 seconds in order to promote decreased risk for fall with ambulation.   Status at eval: 27 seconds with SPC and supervision  Status at recert: 31 seconds with SPC and supervision  Current Status: not met: 28 seconds with 7XN and supervision      The pt will be able to maintain modified tandem stance bilaterally for at least 15 seconds to improve his safety with bathing and household chores.   Status at eval: unable to hold  Status at recert: only able to maintain for 5 seconds  Current Status: progressing: 10 seconds bilaterally          PLAN  [x]  Upgrade activities as tolerated     [x]  Continue plan of care  []  Update interventions per flow sheet       []  Discharge due to:_  []  Other:_      David Do, PT 2/3/2017  10:22 AM    Future Appointments  Date Time Provider Caprice Leal   2/3/2017 10:30 AM Arsenio Callahan [facility]   2/6/2017 12:00 PM David Kidney, PT [facility]   2/8/2017 1:30 PM AMADOR Marcos [facility]   2/13/2017 10:30 AM David Kidney, PT [facility]   2/15/2017 1:00 PM AMADOR MarcosHPANGELINE [facility]

## 2017-02-06 ENCOUNTER — APPOINTMENT (OUTPATIENT)
Dept: PHYSICAL THERAPY | Age: 77
End: 2017-02-06
Payer: MEDICARE

## 2017-02-08 ENCOUNTER — APPOINTMENT (OUTPATIENT)
Dept: PHYSICAL THERAPY | Age: 77
End: 2017-02-08
Payer: MEDICARE

## 2017-02-13 ENCOUNTER — APPOINTMENT (OUTPATIENT)
Dept: PHYSICAL THERAPY | Age: 77
End: 2017-02-13
Payer: MEDICARE

## 2017-02-15 ENCOUNTER — APPOINTMENT (OUTPATIENT)
Dept: PHYSICAL THERAPY | Age: 77
End: 2017-02-15
Payer: MEDICARE

## 2017-02-22 ENCOUNTER — HOSPITAL ENCOUNTER (OUTPATIENT)
Dept: PREADMISSION TESTING | Age: 77
Discharge: HOME | End: 2017-02-22
Payer: MEDICARE

## 2017-02-22 VITALS — BODY MASS INDEX: 29.02 KG/M2 | WEIGHT: 170 LBS | HEIGHT: 64 IN

## 2017-02-22 LAB
ANION GAP BLD CALC-SCNC: 11 MMOL/L (ref 3–18)
ATRIAL RATE: 88 BPM
BACTERIA SPEC CULT: NORMAL
BUN SERPL-MCNC: 10 MG/DL (ref 7–18)
BUN/CREAT SERPL: 9 (ref 12–20)
CALCIUM SERPL-MCNC: 8.9 MG/DL (ref 8.5–10.1)
CALCULATED P AXIS, ECG09: 58 DEGREES
CALCULATED R AXIS, ECG10: 24 DEGREES
CALCULATED T AXIS, ECG11: 44 DEGREES
CHLORIDE SERPL-SCNC: 108 MMOL/L (ref 100–108)
CO2 SERPL-SCNC: 27 MMOL/L (ref 21–32)
CREAT SERPL-MCNC: 1.06 MG/DL (ref 0.6–1.3)
DIAGNOSIS, 93000: NORMAL
ERYTHROCYTE [DISTWIDTH] IN BLOOD BY AUTOMATED COUNT: 12.6 % (ref 11.6–14.5)
GLUCOSE SERPL-MCNC: 135 MG/DL (ref 74–99)
HCT VFR BLD AUTO: 47.5 % (ref 36–48)
HGB BLD-MCNC: 15.1 G/DL (ref 13–16)
MCH RBC QN AUTO: 29.8 PG (ref 24–34)
MCHC RBC AUTO-ENTMCNC: 31.8 G/DL (ref 31–37)
MCV RBC AUTO: 93.9 FL (ref 74–97)
P-R INTERVAL, ECG05: 118 MS
PLATELET # BLD AUTO: 189 K/UL (ref 135–420)
PMV BLD AUTO: 10 FL (ref 9.2–11.8)
POTASSIUM SERPL-SCNC: 4 MMOL/L (ref 3.5–5.5)
Q-T INTERVAL, ECG07: 382 MS
QRS DURATION, ECG06: 84 MS
QTC CALCULATION (BEZET), ECG08: 462 MS
RBC # BLD AUTO: 5.06 M/UL (ref 4.7–5.5)
SERVICE CMNT-IMP: NORMAL
SODIUM SERPL-SCNC: 146 MMOL/L (ref 136–145)
VENTRICULAR RATE, ECG03: 88 BPM
WBC # BLD AUTO: 4.2 K/UL (ref 4.6–13.2)

## 2017-02-22 PROCEDURE — 85027 COMPLETE CBC AUTOMATED: CPT | Performed by: ORTHOPAEDIC SURGERY

## 2017-02-22 PROCEDURE — 93005 ELECTROCARDIOGRAM TRACING: CPT

## 2017-02-22 PROCEDURE — 80048 BASIC METABOLIC PNL TOTAL CA: CPT | Performed by: ORTHOPAEDIC SURGERY

## 2017-02-22 PROCEDURE — 87641 MR-STAPH DNA AMP PROBE: CPT | Performed by: ORTHOPAEDIC SURGERY

## 2017-02-22 RX ORDER — METOPROLOL SUCCINATE 50 MG/1
TABLET, EXTENDED RELEASE ORAL DAILY
COMMUNITY

## 2017-02-22 RX ORDER — ATORVASTATIN CALCIUM 40 MG/1
TABLET, FILM COATED ORAL
COMMUNITY

## 2017-02-22 RX ORDER — HYDROCODONE BITARTRATE AND ACETAMINOPHEN 5; 325 MG/1; MG/1
0.5 TABLET ORAL 2 TIMES DAILY
Status: ON HOLD | COMMUNITY
End: 2017-03-17

## 2017-02-22 RX ORDER — CEFAZOLIN SODIUM 2 G/50ML
2 SOLUTION INTRAVENOUS ONCE
Status: CANCELLED | OUTPATIENT
Start: 2017-02-22 | End: 2017-02-22

## 2017-02-22 RX ORDER — DEXTROMETHORPHAN HYDROBROMIDE, GUAIFENESIN 5; 100 MG/5ML; MG/5ML
1300 LIQUID ORAL DAILY
COMMUNITY
End: 2017-03-17

## 2017-02-22 RX ORDER — SODIUM CHLORIDE, SODIUM LACTATE, POTASSIUM CHLORIDE, CALCIUM CHLORIDE 600; 310; 30; 20 MG/100ML; MG/100ML; MG/100ML; MG/100ML
125 INJECTION, SOLUTION INTRAVENOUS CONTINUOUS
Status: CANCELLED | OUTPATIENT
Start: 2017-02-22

## 2017-02-22 NOTE — PERIOP NOTES
Chg wipes givenPatient states that the family physician is not aware of upcoming procedureDenies sleep apneaDenies family history of anesthesia complications  Does not meet spec pop

## 2017-02-23 ENCOUNTER — APPOINTMENT (OUTPATIENT)
Dept: PHYSICAL THERAPY | Age: 77
End: 2017-02-23
Payer: MEDICARE

## 2017-02-27 ENCOUNTER — APPOINTMENT (OUTPATIENT)
Dept: PHYSICAL THERAPY | Age: 77
End: 2017-02-27
Payer: MEDICARE

## 2017-03-02 PROBLEM — M50.30 DDD (DEGENERATIVE DISC DISEASE), CERVICAL: Status: ACTIVE | Noted: 2017-03-02

## 2017-03-02 PROBLEM — M48.02 CERVICAL SPINAL STENOSIS: Status: ACTIVE | Noted: 2017-03-02

## 2017-03-03 NOTE — H&P
Patient Name:   Marilyn Chaudhry  YOB: 1940      Chief Complaint:  Bilateral buttocks pain. History of Chief Complaint:  Mr. Sugar Serrano is a 19-year-old gentleman who is  being seen at the request of Raghu Aldana NP for bilateral buttocks pain. He has had bilateral buttock pain and bilateral posterior thigh pain since the summer of 2016. He does not remember a specific injury. He has had no previous symptoms. He has been using a Rollator since he was diagnosed with spinal stenosis per MRI in October 2016. He says he crawls at times. He feels he has no quality of life. The pain is constant. He has a history of mini CVAs. The patient has had no numbness, no weakness, no bowel or bladder dysfunction, and no problems with his balance. He finds that yardwork, walking, standing, and sitting make the pain worse. Nothing makes it better. It is worse in the morning. He says it is painful to sit now. He had physical therapy through [facility] in December 2016 twice a week for one month. He had an epidural steroid injection done 10/03/16 with mild relief for three days. He had an epidural steroid injection done 12/07/16 with one month of relief. He has been on hydrocodone one half to tablet to one tablet two to three times a day and Tylenol Arthritis two daily. He had an MRI scan of the cervical spine done at [facility] 10/04/16 and an MRI scan of the lumbar spine done in August 2016 at [facility] [place]. Past Medical/Surgical History:    Disease/Disorder Type Date Side Surgery Date Side Comment   Hypertension          High cholesterol          Transient ischemic attack              Cataract extraction  bilateral        Meniscus surgery  right    Arthritis              Knee replacement 2004 left      Allergies:  No known allergies.   Ingredient Reaction Medication Name Comment   NO KNOWN ALLERGIES          Current Medications:    Medication Directions   aspirin 325 mg tablet take 1 tablet by oral route every day   atorvastatin 40 mg tablet    levothyroxine 50 mcg tablet    Toprol XL 50 mg tablet,extended release    valsartan 320 mg tablet    oxybutynin chloride ER 15 mg tablet,extended release 24 hr    hydrocodone 5 mg-acetaminophen 325 mg tablet      Social History:      SMOKING  Status Tobacco Type Units Per Day Yrs Used   Never smoker        ALCOHOL  Type: Beer. consumed occasionally. Family History:    Disease Detail Family Member Age Cause of Death Comments   Cancer, unknown Mother  N    Diabetes mellitus Father  N      Review of Systems:  A complete review of systems was completed. Pertinent positives include incontinence, indigestion, joint pain, joint stiffness, loss of balance, memory loss and shortness of breath. Pertinent negatives include blurred vision, chest pain, chills, cold, discharge of the eyes, dizziness, double vision, fever, headache, hearing loss, heart murmur, itching of the eyes, palpitations, redness of the eyes, rheumatic fever, ringing in ears, sore throat/hoarseness, weight change, abdominal pain, anxiety, bipolar disorder, bladder/kidney infection, bloody stool, blood in urine, burning sensation, changes in mood, chronic cough, depression, diarrhea, difficulty breathing, difficulty swallowing, fainting, frequent urinating, fracture/dislocation, gas/bloating, gout, hemorrhoids, muscle weakness, nausea/vomiting, numbness/tingling, pain on breathing, painful urination, psoriasis, rash/itching, Raynaud's phenomenon, rheumatoid disease, seizure disorder, sprain/strain, swelling of feet, tendinitis, varicose veins and wheezing. Vitals:  Date BP Pulse Temp (F) Resp. (per min.) Height (Total in.) Weight (lbs.) BMI   02/09/2017     66.00 160.00 25.82     Physical Examination:    Heart- RRR  Lungs-CTA devora  Abdomen- +BS,soft,nontender  Musculoskeletal:  The thoracolumbar spine has normal kyphosis, a normal appearance, and no scoliosis.   There is full range of motion of the cervical, thoracic, and lumbar spine and of the hips, knees, and ankles. Straight leg raising and crossed straight leg raising tests are negative. The cervical spine has a normal appearance, no tenderness to palpation, and no spasm of the paracervical muscle. There is no tenderness on palpation of the trapezius muscle. Flexion, extension, and right and left rotation of the cervical spine are normal.  Examination of the shoulder shows no warmth, no deformity, and no muscle atrophy. There is no tenderness on palpation of the subacromial bursa, the glenohumeral joint region, or the bicipital groove. Range of motion of the shoulder is normal in abduction, forward flexion, extension, and in internal and external rotation. No pain is elicited by motion of the shoulder or by impingement testing. No instability of the shoulder is noted. Neurological:  There is no weakness in the thoracic, lumbar, or sacral spine or in the lower extremities or hips. Sensory and motor examination of the cervical spine elicited no tactile dysesthesia or hyperesthesia and demonstrated normal motor strength of the upper extremities. Shoulder strength is normal in flexion, abduction, adduction, and internal rotation. Normal reflexes are present in the biceps, brachioradialis, and triceps. There is no weakness in the fingers. Gait and stance are normal.  He has hyperreflexia of the upper and lower extremities bilaterally. Radiograph Examination:    Review of x-rays of the cervical spine [facility] 2/9/17 reveal degenerative disc disease at C3 through C7. Review of his MRI scan of the cervical spine [facility] 10/4/16 reveals cervical spinal stenosis at C3 through C7. Impression:  Mr. Sugar Serrano, his wife, and I discussed treatments for his cervical spinal stenosis and lumbar spinal stenosis including surgical intervention, the risks, and benefits as well as the different surgical approaches and decision making.   We also discussed nonsurgical care for this condition including medications, injections, physical therapy, rehabilitation, activity modification, and brace utilization. At this point given his cervical spinal stenosis and myelopathic signs, he would like to proceed with operative intervention. We will plan for C3 to C7 posterior cervical decompression and fusion. We did discuss that this most likely will not effect his lower back and leg pain, and he will most likely need a decompression and fusion at L3 to S1 for the lumbar spine after he has healed from his neck surgery. The risks versus the benefits as well as the alternatives were fully explained to the patient. Risks include, but are not limited to, paralysis, death, heart attack, stroke, pulmonary embolism, deep vein thrombosis, infection, failure to relieve pain, increase in back or arm pain, reherniation, scarring, spinal fluid leak, bowel or bladder dysfunction, bleeding, disease transmission, instrumentation failure, pseudarthrosis, difficulty swallowing, and need for revision surgery. The patient states full understanding of the risks and benefits and wishes to proceed.

## 2017-03-15 ENCOUNTER — ANESTHESIA EVENT (OUTPATIENT)
Dept: SURGERY | Age: 77
DRG: 473 | End: 2017-03-15
Payer: MEDICARE

## 2017-03-15 ENCOUNTER — HOSPITAL ENCOUNTER (INPATIENT)
Age: 77
LOS: 2 days | Discharge: NURSING, REHAB OR LONG-TERM CARE | DRG: 473 | End: 2017-03-17
Attending: ORTHOPAEDIC SURGERY | Admitting: ORTHOPAEDIC SURGERY
Payer: MEDICARE

## 2017-03-15 ENCOUNTER — ANESTHESIA (OUTPATIENT)
Dept: SURGERY | Age: 77
DRG: 473 | End: 2017-03-15
Payer: MEDICARE

## 2017-03-15 ENCOUNTER — APPOINTMENT (OUTPATIENT)
Dept: GENERAL RADIOLOGY | Age: 77
DRG: 473 | End: 2017-03-15
Attending: ORTHOPAEDIC SURGERY
Payer: MEDICARE

## 2017-03-15 PROCEDURE — C1713 ANCHOR/SCREW BN/BN,TIS/BN: HCPCS | Performed by: ORTHOPAEDIC SURGERY

## 2017-03-15 PROCEDURE — 77030011640 HC PAD GRND REM COVD -A: Performed by: ORTHOPAEDIC SURGERY

## 2017-03-15 PROCEDURE — 74011250636 HC RX REV CODE- 250/636: Performed by: ORTHOPAEDIC SURGERY

## 2017-03-15 PROCEDURE — 97530 THERAPEUTIC ACTIVITIES: CPT

## 2017-03-15 PROCEDURE — 77030013079 HC BLNKT BAIR HGGR 3M -A: Performed by: ANESTHESIOLOGY

## 2017-03-15 PROCEDURE — 77030008462 HC STPLR SKN PROX J&J -A: Performed by: ORTHOPAEDIC SURGERY

## 2017-03-15 PROCEDURE — 77030032490 HC SLV COMPR SCD KNE COVD -B: Performed by: ORTHOPAEDIC SURGERY

## 2017-03-15 PROCEDURE — 77030027521 HC SEAL BPLR AQMNTYS EVS MEDT -F: Performed by: ORTHOPAEDIC SURGERY

## 2017-03-15 PROCEDURE — 74011250636 HC RX REV CODE- 250/636: Performed by: PHYSICIAN ASSISTANT

## 2017-03-15 PROCEDURE — 77010033678 HC OXYGEN DAILY

## 2017-03-15 PROCEDURE — 74011000250 HC RX REV CODE- 250

## 2017-03-15 PROCEDURE — 74011000250 HC RX REV CODE- 250: Performed by: ORTHOPAEDIC SURGERY

## 2017-03-15 PROCEDURE — 76210000006 HC OR PH I REC 0.5 TO 1 HR: Performed by: ORTHOPAEDIC SURGERY

## 2017-03-15 PROCEDURE — 77030004402 HC BUR NEUR STRY -C: Performed by: ORTHOPAEDIC SURGERY

## 2017-03-15 PROCEDURE — 77030006579 HC BIT DRL STP SYNT -C: Performed by: ORTHOPAEDIC SURGERY

## 2017-03-15 PROCEDURE — 77030008477 HC STYL SATN SLP COVD -A: Performed by: ANESTHESIOLOGY

## 2017-03-15 PROCEDURE — 97161 PT EVAL LOW COMPLEX 20 MIN: CPT

## 2017-03-15 PROCEDURE — 77030020262 HC SOL INJ SOD CL 0.9% 100ML: Performed by: ORTHOPAEDIC SURGERY

## 2017-03-15 PROCEDURE — 74011250636 HC RX REV CODE- 250/636: Performed by: ANESTHESIOLOGY

## 2017-03-15 PROCEDURE — 74011250636 HC RX REV CODE- 250/636

## 2017-03-15 PROCEDURE — 0RG2071 FUSION OF 2 OR MORE CERVICAL VERTEBRAL JOINTS WITH AUTOLOGOUS TISSUE SUBSTITUTE, POSTERIOR APPROACH, POSTERIOR COLUMN, OPEN APPROACH: ICD-10-PCS | Performed by: ORTHOPAEDIC SURGERY

## 2017-03-15 PROCEDURE — 77030034849: Performed by: ORTHOPAEDIC SURGERY

## 2017-03-15 PROCEDURE — 77030012406 HC DRN WND PENRS BARD -A: Performed by: ORTHOPAEDIC SURGERY

## 2017-03-15 PROCEDURE — 77030003029 HC SUT VCRL J&J -B: Performed by: ORTHOPAEDIC SURGERY

## 2017-03-15 PROCEDURE — 77030020255 HC SOL INJ LR 1000ML BG: Performed by: ORTHOPAEDIC SURGERY

## 2017-03-15 PROCEDURE — 77030018836 HC SOL IRR NACL ICUM -A: Performed by: ORTHOPAEDIC SURGERY

## 2017-03-15 PROCEDURE — 76060000035 HC ANESTHESIA 2 TO 2.5 HR: Performed by: ORTHOPAEDIC SURGERY

## 2017-03-15 PROCEDURE — 77030003028 HC SUT VCRL J&J -A: Performed by: ORTHOPAEDIC SURGERY

## 2017-03-15 PROCEDURE — 77030020782 HC GWN BAIR PAWS FLX 3M -B: Performed by: ORTHOPAEDIC SURGERY

## 2017-03-15 PROCEDURE — 77030034475 HC MISC IMPL SPN: Performed by: ORTHOPAEDIC SURGERY

## 2017-03-15 PROCEDURE — 77030027355 HC HNDPC IRR SURGLAV STRY -B: Performed by: ORTHOPAEDIC SURGERY

## 2017-03-15 PROCEDURE — 76010000131 HC OR TIME 2 TO 2.5 HR: Performed by: ORTHOPAEDIC SURGERY

## 2017-03-15 PROCEDURE — 74011250637 HC RX REV CODE- 250/637: Performed by: PHYSICIAN ASSISTANT

## 2017-03-15 PROCEDURE — 77030006643: Performed by: ANESTHESIOLOGY

## 2017-03-15 PROCEDURE — 65270000029 HC RM PRIVATE

## 2017-03-15 DEVICE — SCREW SPNL POST CERVICOTHORACIC TAN LOK SYNAPSE: Type: IMPLANTABLE DEVICE | Site: SPINE CERVICAL | Status: FUNCTIONAL

## 2017-03-15 DEVICE — SCREW SPNL L14MM DIA35MM CANC POST CERVICOTHORACIC TAN ST: Type: IMPLANTABLE DEVICE | Site: SPINE CERVICAL | Status: FUNCTIONAL

## 2017-03-15 RX ORDER — HYDROCODONE BITARTRATE AND ACETAMINOPHEN 7.5; 325 MG/1; MG/1
1 TABLET ORAL
Status: DISCONTINUED | OUTPATIENT
Start: 2017-03-15 | End: 2017-03-17 | Stop reason: HOSPADM

## 2017-03-15 RX ORDER — HYDROCODONE BITARTRATE AND ACETAMINOPHEN 5; 325 MG/1; MG/1
1 TABLET ORAL
Status: DISCONTINUED | OUTPATIENT
Start: 2017-03-15 | End: 2017-03-17 | Stop reason: HOSPADM

## 2017-03-15 RX ORDER — FENTANYL CITRATE 50 UG/ML
INJECTION, SOLUTION INTRAMUSCULAR; INTRAVENOUS AS NEEDED
Status: DISCONTINUED | OUTPATIENT
Start: 2017-03-15 | End: 2017-03-15 | Stop reason: HOSPADM

## 2017-03-15 RX ORDER — SODIUM CHLORIDE 0.9 % (FLUSH) 0.9 %
5-10 SYRINGE (ML) INJECTION AS NEEDED
Status: DISCONTINUED | OUTPATIENT
Start: 2017-03-15 | End: 2017-03-17 | Stop reason: HOSPADM

## 2017-03-15 RX ORDER — VALSARTAN 160 MG/1
320 TABLET ORAL DAILY
Status: DISCONTINUED | OUTPATIENT
Start: 2017-03-16 | End: 2017-03-17 | Stop reason: HOSPADM

## 2017-03-15 RX ORDER — SODIUM CHLORIDE, SODIUM LACTATE, POTASSIUM CHLORIDE, CALCIUM CHLORIDE 600; 310; 30; 20 MG/100ML; MG/100ML; MG/100ML; MG/100ML
125 INJECTION, SOLUTION INTRAVENOUS CONTINUOUS
Status: DISCONTINUED | OUTPATIENT
Start: 2017-03-15 | End: 2017-03-17 | Stop reason: HOSPADM

## 2017-03-15 RX ORDER — METOPROLOL SUCCINATE 50 MG/1
50 TABLET, EXTENDED RELEASE ORAL DAILY
Status: DISCONTINUED | OUTPATIENT
Start: 2017-03-16 | End: 2017-03-17 | Stop reason: HOSPADM

## 2017-03-15 RX ORDER — DIPHENHYDRAMINE HYDROCHLORIDE 50 MG/ML
12.5 INJECTION, SOLUTION INTRAMUSCULAR; INTRAVENOUS
Status: DISCONTINUED | OUTPATIENT
Start: 2017-03-15 | End: 2017-03-17 | Stop reason: HOSPADM

## 2017-03-15 RX ORDER — LEVOTHYROXINE SODIUM 50 UG/1
50 TABLET ORAL
Status: DISCONTINUED | OUTPATIENT
Start: 2017-03-16 | End: 2017-03-17 | Stop reason: HOSPADM

## 2017-03-15 RX ORDER — EPHEDRINE SULFATE/0.9% NACL/PF 25 MG/5 ML
SYRINGE (ML) INTRAVENOUS AS NEEDED
Status: DISCONTINUED | OUTPATIENT
Start: 2017-03-15 | End: 2017-03-15 | Stop reason: HOSPADM

## 2017-03-15 RX ORDER — HYDROMORPHONE HYDROCHLORIDE 2 MG/ML
0.5 INJECTION, SOLUTION INTRAMUSCULAR; INTRAVENOUS; SUBCUTANEOUS
Status: DISCONTINUED | OUTPATIENT
Start: 2017-03-15 | End: 2017-03-15 | Stop reason: HOSPADM

## 2017-03-15 RX ORDER — PROPOFOL 10 MG/ML
INJECTION, EMULSION INTRAVENOUS AS NEEDED
Status: DISCONTINUED | OUTPATIENT
Start: 2017-03-15 | End: 2017-03-15 | Stop reason: HOSPADM

## 2017-03-15 RX ORDER — ONDANSETRON 2 MG/ML
4 INJECTION INTRAMUSCULAR; INTRAVENOUS ONCE
Status: DISCONTINUED | OUTPATIENT
Start: 2017-03-15 | End: 2017-03-15 | Stop reason: HOSPADM

## 2017-03-15 RX ORDER — CEFAZOLIN SODIUM 2 G/50ML
2 SOLUTION INTRAVENOUS ONCE
Status: COMPLETED | OUTPATIENT
Start: 2017-03-15 | End: 2017-03-15

## 2017-03-15 RX ORDER — MIDAZOLAM HYDROCHLORIDE 1 MG/ML
INJECTION, SOLUTION INTRAMUSCULAR; INTRAVENOUS AS NEEDED
Status: DISCONTINUED | OUTPATIENT
Start: 2017-03-15 | End: 2017-03-15 | Stop reason: HOSPADM

## 2017-03-15 RX ORDER — ONDANSETRON 4 MG/1
4 TABLET, ORALLY DISINTEGRATING ORAL
Status: DISCONTINUED | OUTPATIENT
Start: 2017-03-15 | End: 2017-03-17 | Stop reason: HOSPADM

## 2017-03-15 RX ORDER — FENTANYL CITRATE 50 UG/ML
50 INJECTION, SOLUTION INTRAMUSCULAR; INTRAVENOUS
Status: DISCONTINUED | OUTPATIENT
Start: 2017-03-15 | End: 2017-03-15 | Stop reason: HOSPADM

## 2017-03-15 RX ORDER — HYDROMORPHONE HYDROCHLORIDE 2 MG/ML
INJECTION, SOLUTION INTRAMUSCULAR; INTRAVENOUS; SUBCUTANEOUS AS NEEDED
Status: DISCONTINUED | OUTPATIENT
Start: 2017-03-15 | End: 2017-03-15 | Stop reason: HOSPADM

## 2017-03-15 RX ORDER — DOCUSATE SODIUM 100 MG/1
100 CAPSULE, LIQUID FILLED ORAL 2 TIMES DAILY
Status: DISCONTINUED | OUTPATIENT
Start: 2017-03-15 | End: 2017-03-17 | Stop reason: HOSPADM

## 2017-03-15 RX ORDER — ACETAMINOPHEN 325 MG/1
650 TABLET ORAL
Status: DISCONTINUED | OUTPATIENT
Start: 2017-03-15 | End: 2017-03-17 | Stop reason: HOSPADM

## 2017-03-15 RX ORDER — LIDOCAINE HYDROCHLORIDE 20 MG/ML
INJECTION, SOLUTION EPIDURAL; INFILTRATION; INTRACAUDAL; PERINEURAL AS NEEDED
Status: DISCONTINUED | OUTPATIENT
Start: 2017-03-15 | End: 2017-03-15 | Stop reason: HOSPADM

## 2017-03-15 RX ORDER — DEXAMETHASONE SODIUM PHOSPHATE 4 MG/ML
INJECTION, SOLUTION INTRA-ARTICULAR; INTRALESIONAL; INTRAMUSCULAR; INTRAVENOUS; SOFT TISSUE AS NEEDED
Status: DISCONTINUED | OUTPATIENT
Start: 2017-03-15 | End: 2017-03-15 | Stop reason: HOSPADM

## 2017-03-15 RX ORDER — CEFAZOLIN SODIUM 2 G/50ML
2 SOLUTION INTRAVENOUS EVERY 8 HOURS
Status: COMPLETED | OUTPATIENT
Start: 2017-03-15 | End: 2017-03-16

## 2017-03-15 RX ORDER — GLYCOPYRROLATE 0.2 MG/ML
INJECTION INTRAMUSCULAR; INTRAVENOUS AS NEEDED
Status: DISCONTINUED | OUTPATIENT
Start: 2017-03-15 | End: 2017-03-15 | Stop reason: HOSPADM

## 2017-03-15 RX ORDER — ATORVASTATIN CALCIUM 20 MG/1
20 TABLET, FILM COATED ORAL DAILY
Status: DISCONTINUED | OUTPATIENT
Start: 2017-03-16 | End: 2017-03-17 | Stop reason: HOSPADM

## 2017-03-15 RX ORDER — SODIUM CHLORIDE, SODIUM LACTATE, POTASSIUM CHLORIDE, CALCIUM CHLORIDE 600; 310; 30; 20 MG/100ML; MG/100ML; MG/100ML; MG/100ML
100 INJECTION, SOLUTION INTRAVENOUS CONTINUOUS
Status: DISCONTINUED | OUTPATIENT
Start: 2017-03-15 | End: 2017-03-15 | Stop reason: HOSPADM

## 2017-03-15 RX ORDER — DIAZEPAM 5 MG/1
2.5 TABLET ORAL
Status: DISCONTINUED | OUTPATIENT
Start: 2017-03-15 | End: 2017-03-17 | Stop reason: HOSPADM

## 2017-03-15 RX ORDER — ATORVASTATIN CALCIUM 20 MG/1
40 TABLET, FILM COATED ORAL
Status: DISCONTINUED | OUTPATIENT
Start: 2017-03-15 | End: 2017-03-17 | Stop reason: HOSPADM

## 2017-03-15 RX ORDER — OMEPRAZOLE 20 MG/1
20 CAPSULE, DELAYED RELEASE ORAL DAILY
Status: DISCONTINUED | OUTPATIENT
Start: 2017-03-16 | End: 2017-03-17 | Stop reason: HOSPADM

## 2017-03-15 RX ORDER — ONDANSETRON 2 MG/ML
INJECTION INTRAMUSCULAR; INTRAVENOUS AS NEEDED
Status: DISCONTINUED | OUTPATIENT
Start: 2017-03-15 | End: 2017-03-15 | Stop reason: HOSPADM

## 2017-03-15 RX ORDER — NALOXONE HYDROCHLORIDE 0.4 MG/ML
0.4 INJECTION, SOLUTION INTRAMUSCULAR; INTRAVENOUS; SUBCUTANEOUS AS NEEDED
Status: DISCONTINUED | OUTPATIENT
Start: 2017-03-15 | End: 2017-03-15 | Stop reason: HOSPADM

## 2017-03-15 RX ORDER — SODIUM CHLORIDE 0.9 % (FLUSH) 0.9 %
5-10 SYRINGE (ML) INJECTION EVERY 8 HOURS
Status: DISCONTINUED | OUTPATIENT
Start: 2017-03-15 | End: 2017-03-17 | Stop reason: HOSPADM

## 2017-03-15 RX ORDER — NALOXONE HYDROCHLORIDE 0.4 MG/ML
0.1 INJECTION, SOLUTION INTRAMUSCULAR; INTRAVENOUS; SUBCUTANEOUS AS NEEDED
Status: DISCONTINUED | OUTPATIENT
Start: 2017-03-15 | End: 2017-03-17 | Stop reason: HOSPADM

## 2017-03-15 RX ORDER — ROCURONIUM BROMIDE 10 MG/ML
INJECTION, SOLUTION INTRAVENOUS AS NEEDED
Status: DISCONTINUED | OUTPATIENT
Start: 2017-03-15 | End: 2017-03-15 | Stop reason: HOSPADM

## 2017-03-15 RX ORDER — FLUMAZENIL 0.1 MG/ML
0.2 INJECTION INTRAVENOUS
Status: DISCONTINUED | OUTPATIENT
Start: 2017-03-15 | End: 2017-03-15 | Stop reason: HOSPADM

## 2017-03-15 RX ADMIN — DEXAMETHASONE SODIUM PHOSPHATE 4 MG: 4 INJECTION, SOLUTION INTRA-ARTICULAR; INTRALESIONAL; INTRAMUSCULAR; INTRAVENOUS; SOFT TISSUE at 13:08

## 2017-03-15 RX ADMIN — SODIUM CHLORIDE, SODIUM LACTATE, POTASSIUM CHLORIDE, AND CALCIUM CHLORIDE: 600; 310; 30; 20 INJECTION, SOLUTION INTRAVENOUS at 13:32

## 2017-03-15 RX ADMIN — PROPOFOL 200 MG: 10 INJECTION, EMULSION INTRAVENOUS at 13:05

## 2017-03-15 RX ADMIN — ONDANSETRON 4 MG: 2 INJECTION INTRAMUSCULAR; INTRAVENOUS at 13:08

## 2017-03-15 RX ADMIN — HYDROMORPHONE HYDROCHLORIDE 0.5 MG: 2 INJECTION INTRAMUSCULAR; INTRAVENOUS; SUBCUTANEOUS at 15:40

## 2017-03-15 RX ADMIN — MIDAZOLAM HYDROCHLORIDE 2 MG: 1 INJECTION, SOLUTION INTRAMUSCULAR; INTRAVENOUS at 12:55

## 2017-03-15 RX ADMIN — SODIUM CHLORIDE, SODIUM LACTATE, POTASSIUM CHLORIDE, AND CALCIUM CHLORIDE 125 ML/HR: 600; 310; 30; 20 INJECTION, SOLUTION INTRAVENOUS at 10:22

## 2017-03-15 RX ADMIN — HYDROMORPHONE HYDROCHLORIDE 1 MG: 2 INJECTION, SOLUTION INTRAMUSCULAR; INTRAVENOUS; SUBCUTANEOUS at 13:05

## 2017-03-15 RX ADMIN — GLYCOPYRROLATE 0.2 MG: 0.2 INJECTION INTRAMUSCULAR; INTRAVENOUS at 12:55

## 2017-03-15 RX ADMIN — Medication 10 MG: at 13:55

## 2017-03-15 RX ADMIN — FENTANYL CITRATE 50 MCG: 50 INJECTION, SOLUTION INTRAMUSCULAR; INTRAVENOUS at 14:47

## 2017-03-15 RX ADMIN — LIDOCAINE HYDROCHLORIDE 60 MG: 20 INJECTION, SOLUTION EPIDURAL; INFILTRATION; INTRACAUDAL; PERINEURAL at 13:05

## 2017-03-15 RX ADMIN — FENTANYL CITRATE 50 MCG: 50 INJECTION, SOLUTION INTRAMUSCULAR; INTRAVENOUS at 14:04

## 2017-03-15 RX ADMIN — DOCUSATE SODIUM 100 MG: 100 CAPSULE, LIQUID FILLED ORAL at 20:29

## 2017-03-15 RX ADMIN — SODIUM CHLORIDE, SODIUM LACTATE, POTASSIUM CHLORIDE, AND CALCIUM CHLORIDE: 600; 310; 30; 20 INJECTION, SOLUTION INTRAVENOUS at 14:37

## 2017-03-15 RX ADMIN — FENTANYL CITRATE 100 MCG: 50 INJECTION, SOLUTION INTRAMUSCULAR; INTRAVENOUS at 13:05

## 2017-03-15 RX ADMIN — ATORVASTATIN CALCIUM 40 MG: 20 TABLET, FILM COATED ORAL at 22:24

## 2017-03-15 RX ADMIN — CEFAZOLIN SODIUM 2 G: 2 SOLUTION INTRAVENOUS at 12:57

## 2017-03-15 RX ADMIN — FENTANYL CITRATE 50 MCG: 50 INJECTION, SOLUTION INTRAMUSCULAR; INTRAVENOUS at 14:31

## 2017-03-15 RX ADMIN — CEFAZOLIN SODIUM 2 G: 2 SOLUTION INTRAVENOUS at 20:29

## 2017-03-15 RX ADMIN — HYDROMORPHONE HYDROCHLORIDE: 10 INJECTION, SOLUTION INTRAMUSCULAR; INTRAVENOUS; SUBCUTANEOUS at 15:34

## 2017-03-15 RX ADMIN — ROCURONIUM BROMIDE 50 MG: 10 INJECTION, SOLUTION INTRAVENOUS at 13:05

## 2017-03-15 RX ADMIN — ROCURONIUM BROMIDE 20 MG: 10 INJECTION, SOLUTION INTRAVENOUS at 13:58

## 2017-03-15 NOTE — IP AVS SNAPSHOT
Current Discharge Medication List  
  
CONTINUE these medications which have CHANGED Dose & Instructions Dispensing Information Comments Morning Noon Evening Bedtime HYDROcodone-acetaminophen 5-325 mg per tablet Commonly known as:  [place] What changed:   
- how much to take - when to take this 
- reasons to take this Your last dose was: Your next dose is:    
   
   
 Dose:  1-1.5 Tab Take 1-1.5 Tabs by mouth every four (4) hours as needed for Pain. Max Daily Amount: 9 Tabs. Quantity:  90 Tab Refills:  0 CONTINUE these medications which have NOT CHANGED Dose & Instructions Dispensing Information Comments Morning Noon Evening Bedtime  
 atorvastatin 40 mg tablet Commonly known as:  LIPITOR Your last dose was: Your next dose is: Take  by mouth nightly. Indications: hypercholesterolemia Refills:  0 CENTRUM PO Your last dose was: Your next dose is: Take  by mouth daily. Refills:  0  
     
   
   
   
  
 levothyroxine 50 mcg tablet Commonly known as:  SYNTHROID Your last dose was: Your next dose is:    
   
   
 Dose:  50 mcg Take 50 mcg by mouth Daily (before breakfast). Indications: hypothyroidism Refills:  0  
     
   
   
   
  
 metoprolol succinate 50 mg XL tablet Commonly known as:  TOPROL-XL Your last dose was: Your next dose is: Take  by mouth daily. Indications: hypertension Refills:  0  
     
   
   
   
  
 oxybutynin chloride XL 15 mg CR tablet Commonly known as:  DITROPAN XL Your last dose was: Your next dose is:    
   
   
 Dose:  15 mg Take 15 mg by mouth daily. Indications: INCREASED URINARY FREQUENCY Refills:  0 PriLOSEC 20 mg capsule Generic drug:  omeprazole Your last dose was: Your next dose is: Dose:  20 mg Take 20 mg by mouth daily. Indications: GASTROESOPHAGEAL REFLUX Refills:  0  
     
   
   
   
  
 valsartan 320 mg tablet Commonly known as:  DIOVAN Your last dose was: Your next dose is:    
   
   
 Dose:  320 mg Take 320 mg by mouth daily. Indications: hypertension Refills:  0 STOP taking these medications ECOTRIN 325 mg tablet Generic drug:  aspirin delayed-release TYLENOL ARTHRITIS PAIN 650 mg CR tablet Generic drug:  acetaminophen Where to Get Your Medications Information on where to get these meds will be given to you by the nurse or doctor. ! Ask your nurse or doctor about these medications HYDROcodone-acetaminophen 5-325 mg per tablet

## 2017-03-15 NOTE — PERIOP NOTES
TRANSFER - IN REPORT:    Verbal report received from Salvatore Timmons CRNA and Dari Rooney RN (name) on Silver Barboza  being received from OR (unit) for routine progression of care      Report consisted of patients Situation, Background, Assessment and   Recommendations(SBAR). Information from the following report(s) SBAR, OR Summary, Procedure Summary, Intake/Output and MAR was reviewed with the receiving nurse. Opportunity for questions and clarification was provided. Assessment completed upon patients arrival to unit and care assumed.

## 2017-03-15 NOTE — ROUTINE PROCESS
TRANSFER - IN REPORT:    Verbal report received from Lara Brewer RN(name) on [place]  being received from PACU(unit) for routine post - op      Report consisted of patients Situation, Background, Assessment and   Recommendations(SBAR). Information from the following report(s) SBAR, Kardex, [facility] and Recent Results was reviewed with the receiving nurse. Opportunity for questions and clarification was provided. Assessment completed upon patients arrival to unit and care assumed.

## 2017-03-15 NOTE — BRIEF OP NOTE
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,C5-6,C6-7,STENOSIS CERVICAL REGION,OSTEOPHYTE,CERVICALGIA,ELEVATED CHOLESTEROL,HYPERTENSION,REFLUX,THYROID DISORDER  Postoperative Diagnosis: CERVICAL HERNIATED NUCLEUS PULPOSIS W/RADICULOPATHY C4-5,C5-6,C6-7,STENOSIS CERVICAL REGION,OSTEOPHYTE,CERVICALGIA,ELEVATED CHOLESTEROL,HYPERTENSION,REFLUX,THYROID DISORDER    Procedure: Procedure(s):  C3-C7 POSTERIOR CERVICAL LAMINECTOMIES AND FUSION W/C-ARM  Surgeon(s) and Role:     * Aislinn Copeland MD - Primary  Assistant: Mary Giron PA-C  Anesthesia: General   Estimated Blood Loss: 400cc  Specimens: * No specimens in log *   Findings: spinal stenosis,HNP,DDD B9-3  Complications: none  Implants:   Implant Name Type Inv.  Item Serial No.  Lot No. LRB No. Used Action   SCR SPNE LCK SYNAPSE TI --  - [id number]  SCR SPNE LCK SYNAPSE TI --   [facility] N/A 10 Implanted   SCR SPNE POLYAXL CANC 3.5X14 -- SYNAPSE TI - [id number]  [facility] SPNE POLYAXL CANC 3.5X14 -- Shadia Quinones [id number] N/A 10 Implanted   rods       SY[employer]ES SPINAL [id number] N/A 2 Implanted

## 2017-03-15 NOTE — ANESTHESIA POSTPROCEDURE EVALUATION
Post-Anesthesia Evaluation and Assessment    Patient: Emily Reddy MRN: [id number]  SSN: [id number]   YOB: 1940  Age: 78 y.o. Sex: male      Cardiovascular Function/Vital Signs  /81  Pulse 85  Temp 36.9 °C (98.4 °F)  Resp 12  Ht 5' 4\" (1.626 m)  SpO2 96%    Patient is status post Procedure(s):  C3-C7 POSTERIOR CERVICAL LAMINECTOMIES AND FUSION W/C-ARM. Nausea/Vomiting: Controlled. Postoperative hydration reviewed and adequate. Pain:  Pain Scale 1: FLACC (03/15/17 1540)  Pain Intensity 1: 5 (03/15/17 1540)   Managed. Neurological Status:   Neuro (WDL): Within Defined Limits (03/15/17 1511)   At baseline. Mental Status and Level of Consciousness: Awake/alert    Pulmonary Status:   O2 Device: Nasal cannula (03/15/17 1540)   Adequate oxygenation and airway patent. Complications related to anesthesia: None    Post-anesthesia assessment completed. No concerns. Patient has met all discharge requirements.     Signed By: Pamila Oppenheim, CRNA    March 15, 2017

## 2017-03-15 NOTE — INTERVAL H&P NOTE
H&P Update:  Selene Evangelista was seen and examined. History and physical has been reviewed. The patient has been examined.  There have been no significant clinical changes since the completion of the originally dated History and Physical.    Signed By: Flor Garcia MD     March 15, 2017 7:43 AM

## 2017-03-15 NOTE — OP NOTES
Patient: Don Carter MRN: [id number]  SSN: [id number]    YOB: 1940  Age: 78 y.o. Sex: male      Date of Procedure: 3/15/2017   Preoperative Diagnosis: CERVICAL HERNIATED NUCLEUS PULPOSIS W/RADICULOPATHY C4-5,C5-6,C6-7,STENOSIS CERVICAL REGION,OSTEOPHYTE,CERVICALGIA,ELEVATED CHOLESTEROL,HYPERTENSION,REFLUX,THYROID DISORDER  Postoperative Diagnosis: CERVICAL HERNIATED NUCLEUS PULPOSIS W/RADICULOPATHY C4-5,C5-6,C6-7,STENOSIS CERVICAL REGION,OSTEOPHYTE,CERVICALGIA,ELEVATED CHOLESTEROL,HYPERTENSION,REFLUX,THYROID DISORDER    Procedure: Procedure(s):  C3-C7 POSTERIOR CERVICAL LAMINECTOMIES AND FUSION W/C-ARM  Surgeon(s) and Role:     * Genet Aviles MD - Primary  Assistant: Mohit Villagomez PA-C  Anesthesia: General   Estimated Blood Loss: 400cc  Specimens: * No specimens in log *   Findings: same  Complications: none      Indications: This is a 78y.o. year-old male who presents with significant neck   and bilateral upper extremity pain, worsening ability to do activities of   daily living. X-rays and MRI scan revealing spinal stenosis, degenerative disk disease and disk herniation. Having having failed conservative care,  comes for operative intervention. Procedure:  After correct identification, endotracheal tube anesthesia was induced and antibiotics given. Cranial tongs were then placed. The patient was then rotated prone with the [place] table. Posterior cervical spine was prepped and draped in the usual sterile   fashion. Midline incision made in the posterior cervical spine, taken down   to the spinous processes of C3-7, as well as the posterior   transverse process bilaterally at C3-7. A Gelpi retractor was   then placed. Intraoperative fluoroscopy was used to confirm the correct   level. A bur was then used to create a trough bilaterally at C4-6   as well as removing the superior half of C7. This provided a   completely mobile lamina of C4-6.  A bur was then used to create a starting hole bilaterally at C3-6 and a 14 mm drill was then   placed in superior lateral direction at C7 as well as the   superior anterior direction at C7. The 14 mm screws were then placed from   the [employer] Synapse screw system at C3-7. Rods were then   affixed to the screws using the Synapse caps, each of these caps were then   torqued in position. Intraoperative x-ray revealed excellent hardware   anatomy. The lamina was then completely removed, it was taken to the back   table soft tissue to be removed and be ground up to be used for bone graft,   inferior half of C3 to the superior half of C7 was removed. This provided   excellent midline decompression. Foraminotomy was then performed   bilaterally at C3-7. This provided excellent   decompression around the spinal cord and nerve roots. Wound was then   irrigated with pulse lavage irrigation. Bur was then used to remove the   posterior cortices of the facet joints bilaterally C3-7, as well   as the cartilaginous material of the facet joints C3-7. The bone   graft was taken from the laminectomy site was then placed in posterolateral   gutters, as well as facet joint. Drain was then placed. Fascia was then   closed using a #1 Vicryl suture, subcutaneous tissue approximated with 2-0   Vicryl suture. Skin approximated with staples. Sterile dressing applied. Cranial tongs were removed. The patient tolerated the procedure well, was taken to recovery room in   good condition.

## 2017-03-15 NOTE — ROUTINE PROCESS
Bedside and Verbal shift change report given to Rl Elizabeth RN (oncoming nurse) by Elbert Waite RN (offgoing nurse). Report included the following information SBAR, Kardex, MAR and Recent Results.
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
## 2017-03-15 NOTE — ANESTHESIA PREPROCEDURE EVALUATION
Anesthetic History   No history of anesthetic complications            Review of Systems / Medical History  Patient summary reviewed, nursing notes reviewed and pertinent labs reviewed    Pulmonary  Within defined limits                 Neuro/Psych       CVA  TIA     Cardiovascular    Hypertension: well controlled              Exercise tolerance: >4 METS     GI/Hepatic/Renal     GERD           Endo/Other      Hypothyroidism: well controlled       Other Findings   Comments: Slurred speech from stroke.  Was previously on asa         Physical Exam    Airway  Mallampati: II  TM Distance: 4 - 6 cm  Neck ROM: decreased range of motion   Mouth opening: Normal     Cardiovascular    Rhythm: regular  Rate: normal         Dental         Pulmonary  Breath sounds clear to auscultation               Abdominal  GI exam deferred       Other Findings            Anesthetic Plan    ASA: 3  Anesthesia type: general          Induction: Intravenous  Anesthetic plan and risks discussed with: Patient

## 2017-03-15 NOTE — PERIOP NOTES
TRANSFER - OUT REPORT:    Verbal report given to Jeanette RN (name) on Mer Kauffman  being transferred to [address] [place] [address] (unit) for routine progression of care       Report consisted of patients Situation, Background, Assessment and   Recommendations(SBAR). Information from the following report(s) SBAR, OR Summary, Procedure Summary, Intake/Output and MAR was reviewed with the receiving nurse. Lines:   Peripheral IV 03/15/17 Left Forearm (Active)   Site Assessment Clean, dry, & intact 3/15/2017  4:05 PM   Phlebitis Assessment 0 3/15/2017  4:05 PM   Infiltration Assessment 0 3/15/2017  4:05 PM   Dressing Status Clean, dry, & intact 3/15/2017  4:05 PM   Dressing Type Transparent;Tape 3/15/2017  4:05 PM   Hub Color/Line Status Green; Infusing 3/15/2017  4:05 PM        Opportunity for questions and clarification was provided.       Patient transported with:   O2 @ 2 liters  Registered Nurse  [facility]

## 2017-03-15 NOTE — PERIOP NOTES
Family has been updated and given inpatient room #. Receiving unit notified that SBAR is available for review.

## 2017-03-15 NOTE — IP AVS SNAPSHOT
[address] [place] [id number] 
[phone] Patient: Emily Reddy MRN: [id number] :1940 You are allergic to the following No active allergies Recent Documentation Height Smoking Status 1.626 m Never Smoker Emergency Contacts Name Discharge Info Relation Home Work Mobile Jenny CAREGIVER [3] Spouse [3] [phone] About your hospitalization You were admitted on:  March 15, 2017 You last received care in the:  [facility] [address] You were discharged on:  2017 Unit phone number:  [phone] Why you were hospitalized Your primary diagnosis was:  Cervical Spinal Stenosis Your diagnoses also included:  Ddd (Degenerative Disc Disease), Cervical  
  
  
 
  
  
Providers Seen During Your Hospitalizations Provider Role Specialty Primary office phone Murali Bryson MD Attending Provider Orthopedic Surgery [phone] Your Primary Care Physician (PCP) Primary Care Physician Office Phone Office Fax SERA WADDELL ** None ** ** None ** Follow-up Information Follow up With Details Comments Contact Info Murali Bryson MD On 3/28/2017 Follow up appointment @ 1:30pm [facility] MARIO KELLY [facility] Orthopedic and [address] [place] [address] 
[phone] [facility]   [phone] Elton Canales MD     
  
  
 
  
  
  
Current Discharge Medication List  
  
CONTINUE these medications which have CHANGED Dose & Instructions Dispensing Information Comments Morning Noon Evening Bedtime HYDROcodone-acetaminophen 5-325 mg per tablet Commonly known as:  Vonda Hooks What changed:   
- how much to take - when to take this 
- reasons to take this Your last dose was: Your next dose is:    
   
   
 Dose:  1-1.5 Tab Take 1-1.5 Tabs by mouth every four (4) hours as needed for Pain. Max Daily Amount: 9 Tabs. Quantity:  90 Tab Refills:  0 CONTINUE these medications which have NOT CHANGED Dose & Instructions Dispensing Information Comments Morning Noon Evening Bedtime  
 atorvastatin 40 mg tablet Commonly known as:  LIPITOR Your last dose was: Your next dose is: Take  by mouth nightly. Indications: hypercholesterolemia Refills:  0 CENTRUM PO Your last dose was: Your next dose is: Take  by mouth daily. Refills:  0  
     
   
   
   
  
 levothyroxine 50 mcg tablet Commonly known as:  SYNTHROID Your last dose was: Your next dose is:    
   
   
 Dose:  50 mcg Take 50 mcg by mouth Daily (before breakfast). Indications: hypothyroidism Refills:  0  
     
   
   
   
  
 metoprolol succinate 50 mg XL tablet Commonly known as:  TOPROL-XL Your last dose was: Your next dose is: Take  by mouth daily. Indications: hypertension Refills:  0  
     
   
   
   
  
 oxybutynin chloride XL 15 mg CR tablet Commonly known as:  DITROPAN XL Your last dose was: Your next dose is:    
   
   
 Dose:  15 mg Take 15 mg by mouth daily. Indications: INCREASED URINARY FREQUENCY Refills:  0 PriLOSEC 20 mg capsule Generic drug:  omeprazole Your last dose was: Your next dose is:    
   
   
 Dose:  20 mg Take 20 mg by mouth daily. Indications: GASTROESOPHAGEAL REFLUX Refills:  0  
     
   
   
   
  
 valsartan 320 mg tablet Commonly known as:  DIOVAN Your last dose was: Your next dose is:    
   
   
 Dose:  320 mg Take 320 mg by mouth daily. Indications: hypertension Refills:  0 STOP taking these medications ECOTRIN 325 mg tablet Generic drug:  aspirin delayed-release TYLENOL ARTHRITIS PAIN 650 mg CR tablet Generic drug:  acetaminophen Where to Get Your Medications Information on where to get these meds will be given to you by the nurse or doctor. ! Ask your nurse or doctor about these medications HYDROcodone-acetaminophen 5-325 mg per tablet Discharge Instructions Dr. Mechelle Luz Operative Instructions: Cervical Fusion *YOU MUST AVOID SMOKING OR BEING AROUND ANYONE WHO SMOKES. AVOID ALL PRODUCTS THAT CONTAIN NICOTINE. DO NOT TAKE IBUPROFEN OR ANTI--INFLAMMATORIES, AS THESE MAY ALTER THE HEALING OF THE FUSION. Diet: 1. Begin with liquids and light foods such as jell-o or soups 2. Advance as tolerated to your regular diet if not nauseated. 3.  Swallowing difficulties may be experienced up to 2 weeks post-operatively. Modify food thickness as necessary. You may also obtain over-the-counter chloraseptic spray. Medications: 1. Strong oral narcotic pain medications have been prescribed for the first few days. Use only as directed. No pain medication is capable of taking away all the pain. Taking your pills at regular intervals will give you the best chance of having less pain. 2. If you need a refill PLEASE PLAN AHEAD. Call our office during regular hours (8-5). 3. Do not combine with alcoholic beverages. 4. Be careful as you walk, climb stairs or drive as mild dizziness is not unusual. 
5. Do not take medications that have not been prescribed by your surgeon. 6. You may switch to over the counter pain medication of your choice as you become more comfortable. Activity and Restrictions: 
1. You should not drive until you are clear by your surgeon at your post-operative visit. 2.  In regards to your cervical collar, you MUST wear this at all times until your first follow-up appointment. 3.  You should wear the collar even when sleeping. 4.  It can be removed for short periods of time as long as you are keeping your head centered over the shoulders without rotating or looking up or down. 5. You may take short walks inside and outside of your home and climb stairs. 6. You are to avoid work, housework, yard work, snow shoveling, lifting of more than a few pounds, overhead work or strenuous activity. 7. Avoid any excessive stretching or range-of-motion of the neck. Non-painful range-of-motion of the neck is allowed 8. Follow-up with Dr. Evelyn Goodman in 7-10 days. DRIVIN. You should not drive until after your follow-up appointment. 2.  You can be in a vehicle for short distances, but if you travel any long distance, please stop about every 30 minutes and walk/stretch. 3.  You should NEVER drive while taking narcotic medication. BATHING and INCISION CARE: 
1. The incision may be tender to touch or feel numb: this is normal.  
2.  Keep the incision clean and dry. Do not get the incision wet for 5 days. The incision will be closed with sutures under the skin and the skin will be glued. 3.  Do not apply any lotions, ointments or oils on the incision. 4.  If you notice any excessive swelling, redness, or persistent drainage around the incision, notify the office immediately. RETURN TO WORK : 
1. The decision to return to work will be determined on an individual basis. 2.  Many people who have a strenuous job (construction, heavy labor, etc) may need to be off work for up to 8 weeks. 3.  If you need a work note, please let us know as soon as possible, and not the same day you are planning to return to work. NUTRITION : 
1.  Good nutrition is an essential part of healing. 2.  You should eat a balanced diet each day, including fruits, vegetables, dairy products and protein. 3.  Remember to drink plenty of water.   
4.  If you have not had a bowel movement within 3 days of surgery, you will need to use a laxative or suppository that can be obtained over-the-counter at your local pharmacy. BONE STIMULATOR: 
1. A spinal bone stimulator may be prescribed for you under certain situations. 2.  A nurse will call you if one has been requested and discuss its use for approximately 4-6 months post-op every day. 3.  This device assists in bone healing and fusion. CALL THE OFFICE: 
? If you have severe pain unrelieved by the medications, new numbness or tingling in your arms; 
? If you have a fever of 101.0°F or greater;  
? If you notice excessive swelling, redness, or persistent drainage from the incision or IV site; The [facility] office number is [phone] from 8:00am to 5:00pm Monday through Friday. After 5:00pm, on weekends, or holidays, please leave a message with our answering service and the doctor on-call will get back to you shortly. Discharge Orders None [employer] Announcement We are excited to announce that we are making your provider's discharge notes available to you in [employer]. You will see these notes when they are completed and signed by the physician that discharged you from your recent hospital stay. If you have any questions or concerns about any information you see in [employer], please call the Health Information Department where you were seen or reach out to your Primary Care Provider for more information about your plan of care. Introducing [facility]! [employer] introduces [employer] patient portal. Now you can access parts of your medical record, email your doctor's office, and request medication refills online. 1. In your internet browser, go to [email or web address] 2. Click on the First Time User? Click Here link in the Sign In box. You will see the New Member Sign Up page. 3. Enter your [employer] Access Code exactly as it appears below.  You will not need to use this code after youve completed the sign-up process. If you do not sign up before the expiration date, you must request a new code. · [employer] Access Code: [email or web address] Expires: 6/15/2017  8:31 AM 
 
4. Enter the last four digits of your Social Security Number (xxxx) and Date of Birth (mm/dd/yyyy) as indicated and click Submit. You will be taken to the next sign-up page. 5. Create a [employer] ID. This will be your [employer] login ID and cannot be changed, so think of one that is secure and easy to remember. 6. Create a [employer] password. You can change your password at any time. 7. Enter your Password Reset Question and Answer. This can be used at a later time if you forget your password. 8. Enter your e-mail address. You will receive e-mail notification when new information is available in [address] 9. Click Sign Up. You can now view and download portions of your medical record. 10. Click the Download Summary menu link to download a portable copy of your medical information. If you have questions, please visit the Frequently Asked Questions section of the [employer] website. Remember, [employer] is NOT to be used for urgent needs. For medical emergencies, dial 911. Now available from your iPhone and Android! General Information Please provide this summary of care documentation to your next provider. Patient Signature:  ____________________________________________________________ Date:  ____________________________________________________________  
  
Albert Alexander Provider Signature:  ____________________________________________________________ Date:  ____________________________________________________________

## 2017-03-15 NOTE — PERIOP NOTES
Transfer of care performed at the bedside with MELINDA Reid. Opportunity for questions and clarification was provided. Blood pressure (!) 156/94, pulse 78, temperature 97.4 °F (36.3 °C), resp. rate 16, height 5' 4\" (1.626 m), SpO2 98 %.

## 2017-03-15 NOTE — PROGRESS NOTES
[address] [place] [address] care of pt at this time. Assessment complete. Pt alert and oriented x 4 but drowsy resting in bed. Pt had a C3-C7 done today by Dr Kelly Grissom. Denies SOB and chest pain. Pt lungs clear bilaterally. Cap refill less than 3 seconds. Pt denies numbness and tingling to all extremities. Stated pain 0/10. Pt has 18 GIV to L forearm. Pt has ABD dressing with tape to posterior neck CDI neck brace and hemovac in place in place. SCD's and TEDs applied to BLE. Pt encouraged to continue use of IS. Pt verbalized understanding. Ice pack applied. Call light and possessions within reach. Bed in low position. Will continue to monitor. 1840  Pt took side steps at bedside with PT.      1852  PT informed nurse that pt is a x2 assist    Shift summary   Pt is alert and oriented x 4. Pt had uneventful shift. Pt ambulating and voiding sufficient amounts. Pain controlled by PRN medication.

## 2017-03-16 LAB
ERYTHROCYTE [DISTWIDTH] IN BLOOD BY AUTOMATED COUNT: 12.4 % (ref 11.6–14.5)
HCT VFR BLD AUTO: 39.5 % (ref 36–48)
HGB BLD-MCNC: 12.7 G/DL (ref 13–16)
MCH RBC QN AUTO: 29.6 PG (ref 24–34)
MCHC RBC AUTO-ENTMCNC: 32.2 G/DL (ref 31–37)
MCV RBC AUTO: 92.1 FL (ref 74–97)
PLATELET # BLD AUTO: 143 K/UL (ref 135–420)
PMV BLD AUTO: 10.1 FL (ref 9.2–11.8)
RBC # BLD AUTO: 4.29 M/UL (ref 4.7–5.5)
WBC # BLD AUTO: 7.8 K/UL (ref 4.6–13.2)

## 2017-03-16 PROCEDURE — 74011250636 HC RX REV CODE- 250/636: Performed by: PHYSICIAN ASSISTANT

## 2017-03-16 PROCEDURE — 97116 GAIT TRAINING THERAPY: CPT

## 2017-03-16 PROCEDURE — 36415 COLL VENOUS BLD VENIPUNCTURE: CPT | Performed by: PHYSICIAN ASSISTANT

## 2017-03-16 PROCEDURE — 97110 THERAPEUTIC EXERCISES: CPT

## 2017-03-16 PROCEDURE — 65270000029 HC RM PRIVATE

## 2017-03-16 PROCEDURE — 97530 THERAPEUTIC ACTIVITIES: CPT

## 2017-03-16 PROCEDURE — 97535 SELF CARE MNGMENT TRAINING: CPT

## 2017-03-16 PROCEDURE — 74011250637 HC RX REV CODE- 250/637: Performed by: PHYSICIAN ASSISTANT

## 2017-03-16 PROCEDURE — 85027 COMPLETE CBC AUTOMATED: CPT | Performed by: PHYSICIAN ASSISTANT

## 2017-03-16 PROCEDURE — 97165 OT EVAL LOW COMPLEX 30 MIN: CPT

## 2017-03-16 PROCEDURE — 74011250636 HC RX REV CODE- 250/636: Performed by: ORTHOPAEDIC SURGERY

## 2017-03-16 RX ADMIN — METOPROLOL SUCCINATE 50 MG: 50 TABLET, EXTENDED RELEASE ORAL at 08:54

## 2017-03-16 RX ADMIN — ATORVASTATIN CALCIUM 20 MG: 20 TABLET, FILM COATED ORAL at 08:54

## 2017-03-16 RX ADMIN — HYDROCODONE BITARTRATE AND ACETAMINOPHEN 1 TABLET: 7.5; 325 TABLET ORAL at 03:33

## 2017-03-16 RX ADMIN — SODIUM CHLORIDE, SODIUM LACTATE, POTASSIUM CHLORIDE, AND CALCIUM CHLORIDE 125 ML/HR: 600; 310; 30; 20 INJECTION, SOLUTION INTRAVENOUS at 03:18

## 2017-03-16 RX ADMIN — HYDROCODONE BITARTRATE AND ACETAMINOPHEN 1 TABLET: 5; 325 TABLET ORAL at 20:50

## 2017-03-16 RX ADMIN — CEFAZOLIN SODIUM 2 G: 2 SOLUTION INTRAVENOUS at 03:33

## 2017-03-16 RX ADMIN — ATORVASTATIN CALCIUM 40 MG: 20 TABLET, FILM COATED ORAL at 20:50

## 2017-03-16 RX ADMIN — DIAZEPAM 2.5 MG: 5 TABLET ORAL at 18:30

## 2017-03-16 RX ADMIN — Medication 10 ML: at 20:51

## 2017-03-16 RX ADMIN — OMEPRAZOLE 20 MG: 20 CAPSULE, DELAYED RELEASE ORAL at 08:54

## 2017-03-16 RX ADMIN — CEFAZOLIN SODIUM 2 G: 2 SOLUTION INTRAVENOUS at 11:38

## 2017-03-16 RX ADMIN — OXYBUTYNIN CHLORIDE 15 MG: 5 TABLET, EXTENDED RELEASE ORAL at 08:54

## 2017-03-16 RX ADMIN — DOCUSATE SODIUM 100 MG: 100 CAPSULE, LIQUID FILLED ORAL at 20:50

## 2017-03-16 RX ADMIN — HYDROCODONE BITARTRATE AND ACETAMINOPHEN 1 TABLET: 7.5; 325 TABLET ORAL at 15:40

## 2017-03-16 RX ADMIN — HYDROCODONE BITARTRATE AND ACETAMINOPHEN 1 TABLET: 7.5; 325 TABLET ORAL at 11:36

## 2017-03-16 RX ADMIN — VALSARTAN 320 MG: 160 TABLET ORAL at 08:54

## 2017-03-16 RX ADMIN — DOCUSATE SODIUM 100 MG: 100 CAPSULE, LIQUID FILLED ORAL at 08:54

## 2017-03-16 RX ADMIN — LEVOTHYROXINE SODIUM 50 MCG: 50 TABLET ORAL at 07:19

## 2017-03-16 NOTE — PROGRESS NOTES
Problem: Mobility Impaired (Adult and Pediatric)  Goal: *Acute Goals and Plan of Care (Insert Text)  In 1-7 days pt will be able to perform:  STG  1. Bed mobility: Demonstrate proper log-roll technique for safe initiation of rolling for OOB activities. 2. Supine to sit to supine S with HR for meals. 3. Sit to stand to sit S with RW cervical collar in prep for ambulation. LT. Gait: Ambulate 15ft S with RW cervical collar, for home/community mobility. 2. Activity tolerance: Tolerate up in chair 30 minutes-1 hour for ADLs. 3. Patient/Family Education: Patient/family to be independent with HEP for follow-up care and safe discharge. Outcome: Progressing Towards Goal  PHYSICAL THERAPY TREATMENT     Patient: Don Carter (84 y.o. male)  Date: 3/16/2017  Diagnosis: CERVICAL HERNIATED NUCLEUS PULPOSIS W/RADICULOPATHY C4-5,C5-6,C6-7,STENOSIS CERVICAL REGION,OSTEOPHYTE,CERVICALGIA,ELEVATED CHOLESTEROL,HYPERTENSION,REFLUX,THYROID DISORDER  Cervical spinal stenosis Cervical spinal stenosis  Procedure(s) (LRB):  C3-C7 POSTERIOR CERVICAL LAMINECTOMIES AND FUSION W/C-ARM (N/A) 1 Day Post-Op  Precautions: Fall   Chart, physical therapy assessment, plan of care and goals were reviewed. ASSESSMENT:  Pt sitting up at the EOB with nursing and wife in the room at start of PT session demonstrating fair dynamic sitting balance. Pt very drowsy during PT session which limited his participation. During sit<>stand transfer pt required ModA x 2 with RW use, and bed raised to higher level with significant forward flexed posture. Pt able to improve posture with max verbal and tactile cuing but was only able to maintain upright posture for a few seconds before returning to forward flexion of back and flexion of knees. Due to knees buckling pt unable to participate with gait training due to safety concerns. Left pt in bed as requested with SCDs applied, all needs met and wife in the room.   Recommend rehab at time of discharge. Progression toward goals:  [ ]      Improving appropriately and progressing toward goals  [X]      Improving slowly and progressing toward goals  [ ]      Not making progress toward goals and plan of care will be adjusted       PLAN:  Patient continues to benefit from skilled intervention to address the above impairments. Continue treatment per established plan of care. Discharge Recommendations:  Rehab  Further Equipment Recommendations for Discharge:  rolling walker       SUBJECTIVE:   Patient stated \"I just don't feel good.       OBJECTIVE DATA SUMMARY:   Critical Behavior:  Neurologic State: Appropriate for age, Drowsy  Orientation Level: Oriented X4  Cognition: Appropriate decision making  Safety/Judgement: Awareness of environment  Functional Mobility Training:  Bed Mobility:  Rolling: Minimum assistance   Sit to Supine: Moderate assistance  Scooting: Moderate assistance   Transfers:  Sit to Stand: Moderate assistance;Assist x2  Stand to Sit: Moderate assistance;Assist x2  Balance:  Sitting: Impaired; With support  Sitting - Static: Good (unsupported)  Sitting - Dynamic: Fair (occasional)  Standing: Impaired; With support  Standing - Static: Fair;Constant support  Standing - Dynamic : Poor  Ambulation/Gait Training:   Pt unable to participate with gait training due to LE buckling/weakness  Therapeutic Exercises:   B LE HEP included ankle pumps, AAROM heel slides x 10 reps, LAQ x 3 reps  Pain:  Pain Scale 1: Numeric (0 - 10)  Pain Intensity 1: 3  Pain Location 1: Neck  Pain Orientation 1: Posterior  Pain Description 1: Constant; Aching  Pain Intervention(s) 1: Medication (see MAR)  Activity Tolerance:   Fair-  Please refer to the flowsheet for vital signs taken during this treatment.   After treatment:   [ ] Patient left in no apparent distress sitting up in chair  [X] Patient left in no apparent distress in bed  [X] Call bell left within reach  [X] Nursing notified  [X] Caregiver present  [ ] Bed alarm activated        Darwin Rick Estevan PT, DPT      Time Calculation: 28 mins

## 2017-03-16 NOTE — ROUTINE PROCESS
Bedside and Verbal shift change report given to Virgina Bosworth, RN (oncoming nurse) by Hang Beckham RN   (offgoing nurse). Report included the following information SBAR, Kardex, Procedure Summary, Intake/Output and MAR.

## 2017-03-16 NOTE — PROGRESS NOTES
2329-Resting quietly in bed, stable. Denies numbness and tingling to extremities. Denies pain at this time. Dressing, posterior neck, intact, no drainage with cervical collar in place. Hemo vac in place; patent, draining. Root catheter in place; patent, draining. Incentive spirometer in use, return demonstration done. Disoriented to year, states that it's 2007; reoriented to current year. Dilaudid PCA in use. Call light and personal items within reach. Assessment complete. 0119-CNA, Cammy Ray  found patient sitting up on end of bed with SCD's attached. Assisted with getting patient back up in bed and bed alarm activated for safety. Call light and personal items were within reach. Patient reminded to call for assistance. 0240-Restless, antsy, can't get comfortable in bed. Repositioned with assistance of staff nurse, Araceli North RN.    0330-Anxious, agitated. Complaining of pain. Restless, can't get comfortable in bed. Encouraged to use PCA, but still complaining of pain. Repositioned in bed again. Patient to be medicated for pain. 0340-No other changes than those listed above to initial assessment. Medicated for pain. Reassessment complete. 0650-Root catheter removed. Wife at bedside. Patient resting quietly. Urinal within reach. Shift Summary: Restless during early part of shift, settled down after medication given for breakthrough pain. Wife at bedside this morning, patient resting quietly and calm. Stable. Chlorhexidine wipes done during shift.

## 2017-03-16 NOTE — PROGRESS NOTES
Shift summary: Patient A&Ox4. Posterior neck incision with ABD pad clean, dry, intact. C-collar in place. Small abrasion with dried blood seen to right side of head, behind ear. Patient denies hitting his head. Patient has history of TIA, some delayed responses and right-sided weakness. Denied numbness/tingling to all extremities. Used PCA dilaudid for neck pain. Needed repositioning to get comfortable in bed. Root drained clear yellow urine.

## 2017-03-16 NOTE — PROGRESS NOTES
Plan: pt has been accepted and booked for admission to [facility] [address] [place] [address] for Friday 3/17 admission. Please call report to  [phone] and send dc summary, avs and hard scripts for controlled medications with pt to facility. Pt will need medical transport for safety, unable to ambulate, sit, transfer with out assist.  Wife aware of plan, and that pt could incur cost for transportation. Please medicate pt for pain prior to leaving unit if needed.

## 2017-03-16 NOTE — PROGRESS NOTES
conducted an initial visit with Finn Rosario, who is a 78 y.o.,male. The  provided the following Interventions:  Initiated a relationship of care and support. Offered prayer and assurance of continued prayers on patient's behalf. Plan:  Chaplains will continue to follow and will provide pastoral care on an as needed/requested basis.  recommends bedside caregivers page  on duty if patient shows signs of acute spiritual or emotional distress.     NENO AngeloDiv.  [phone] - Office

## 2017-03-16 NOTE — PROGRESS NOTES
6357  Assumed care of pt at this time. Assessment complete. Pt alert and oriented x 4 resting in bed. Denies SOB and chest pain. Pt lungs clear bilaterally. Cap refill less than 3 seconds. Pt denies numbness and tingling to all extremities. Stated pain 3/10. Pt has 18 G IV to left forearm. Pt has ABD with tape dressing to posterior neck CDI . SCD's and TEDs in place. Pt encouraged to continue use of IS. Pt verbalized understanding. Ice pack applied. Call light and possessions within reach. Bed in low position. Will continue to monitor. Wife at bedside. 2490  PT and OT in to see pt.     0481  Nurse informed that pt took side steps at bedside with x2 assist. Pt seemed very drowsy. Will see pt again in afternoon. 1136  Pt stated pain is 5/10. Medicated with 7.5 mg of Norco.     1140  Pt ambulated in room to sit up in chair. Possessions within reach. Wife at bedside. 12  Pt ambulated to doorway with PT and returned to bed. 1540  Pt stated pain is 5/10.  Medicated with 7.5 mg of norco.

## 2017-03-16 NOTE — PROGRESS NOTES
Met with pt and wife at bedside. Pt drowsy, CM spoke with wife about discharge plan. [facility] offered and pt/wife would like [facility] or [facility] for rehab follow up; referral placed with CMS. CM following for finalizing discharge plan. Care Management Interventions  PCP Verified by CM:  Yes  Transition of Care Consult (CM Consult): Discharge Planning, SNF  Discharge Durable Medical Equipment: No  Physical Therapy Consult: Yes  Occupational Therapy Consult: Yes  Current Support Network: Lives with Spouse, Own Home  Confirm Follow Up Transport: Family  Plan discussed with Pt/Family/Caregiver: Yes  Freedom of Choice Offered: Yes  Discharge Location  Discharge Placement: Skilled nursing facility

## 2017-03-16 NOTE — ROUTINE PROCESS
Bedside and Verbal shift change report given to Yesenia Posey RN (oncoming nurse) by Tyra Montiel RN (offgoing nurse). Report included the following information SBAR and Kardex.

## 2017-03-16 NOTE — ROUTINE PROCESS
Bedside and Verbal shift change report given to Hamilton Blancas RN (oncoming nurse) by Swapna Cope RN (offgoing nurse). Report included the following information SBAR and Kardex.

## 2017-03-16 NOTE — PROGRESS NOTES
Problem: Mobility Impaired (Adult and Pediatric)  Goal: *Acute Goals and Plan of Care (Insert Text)  In 1-7 days pt will be able to perform:  STG  1. Bed mobility: Demonstrate proper log-roll technique for safe initiation of rolling for OOB activities. 2. Supine to sit to supine S with HR for meals. 3. Sit to stand to sit S with RW cervical collar in prep for ambulation. LT. Gait: Ambulate 15ft S with RW cervical collar, for home/community mobility. 2. Activity tolerance: Tolerate up in chair 30 minutes-1 hour for ADLs. 3. Patient/Family Education: Patient/family to be independent with HEP for follow-up care and safe discharge. Outcome: Progressing Towards Goal  PHYSICAL THERAPY TREATMENT     Patient: Emily Reddy (89 y.o. male)  Date: 3/16/2017  Diagnosis: CERVICAL HERNIATED NUCLEUS PULPOSIS W/RADICULOPATHY C4-5,C5-6,C6-7,STENOSIS CERVICAL REGION,OSTEOPHYTE,CERVICALGIA,ELEVATED CHOLESTEROL,HYPERTENSION,REFLUX,THYROID DISORDER  Cervical spinal stenosis Cervical spinal stenosis  Procedure(s) (LRB):  C3-C7 POSTERIOR CERVICAL LAMINECTOMIES AND FUSION W/C-ARM (N/A) 1 Day Post-Op  Precautions: Fall, Spinal   Chart, physical therapy assessment, plan of care and goals were reviewed. ASSESSMENT:  Patient found in left side lying in bed willing to work with PT. PT ambulated in room this tx, first with side steps at EOB, then to door in room. PT continues to present with forward trunk lean and has trouble advancing right LE. Pt becoming fatigued at 10 feet and requires up to max A to stay standing and a chair to sit and take seated rest break, pt then ambulated back to bed. Pt reaching outside of RW to mattress before squared up to bed, requiring mod A to guide hips to bed. Pt left in right sidelying as her report she has mostly been staying on his left side. Needs left in reach.  Pt's wife states that pt's right sided weakness has been present since having mini strokes in past.     Education: gait, RW usage, transfers. Progression toward goals:  [ ]      Improving appropriately and progressing toward goals  [X]      Improving slowly and progressing toward goals  [ ]      Not making progress toward goals and plan of care will be adjusted       PLAN:  Patient continues to benefit from skilled intervention to address the above impairments. Continue treatment per established plan of care. Discharge Recommendations:  Rehab  Further Equipment Recommendations for Discharge:  rolling walker       SUBJECTIVE:   Patient stated I can't move that right leg.       OBJECTIVE DATA SUMMARY:   Critical Behavior:  Neurologic State: Alert  Orientation Level: Oriented X4  Cognition: Follows commands  Safety/Judgement: Fall prevention  Functional Mobility Training:  Bed Mobility:  Rolling: Stand-by asssistance; Additional time  Supine to Sit: Stand-by asssistance  Sit to Supine: Minimum assistance  Scooting: Moderate assistance  Transfers:  Sit to Stand: Maximum assistance;Assist x2  Stand to Sit: Minimum assistance;Assist x2  Balance:  Sitting: With support; Intact  Sitting - Static: Good (unsupported)  Sitting - Dynamic: Good (unsupported)  Standing: Impaired; With support  Standing - Static: Fair  Standing - Dynamic : Fair (Fair to poor)  Ambulation/Gait Training:  Distance (ft): 20 Feet (ft)  Assistive Device: Walker, rolling;Gait belt  Ambulation - Level of Assistance: Minimal assistance;Maximum assistance  Gait Abnormalities: Antalgic  Base of Support: Narrowed; Center of gravity altered  Speed/Adela: Slow;Pace decreased (<100 feet/min)  Step Length: Left shortened;Right shortened  Interventions: Verbal cues; Safety awareness training  Therapeutic Exercises:   LAQ, seated marches, ankle pumps X 10 bilaterally.   Pain:  Pre tx pain: 5  Post tx pain: 6  Pain Scale 1: Numeric (0 - 10)  Pain Intensity 1: 5  Pain Location 1: Neck  Pain Orientation 1: Posterior  Activity Tolerance:   Fair  Please refer to the flowsheet for vital signs taken during this treatment.   After treatment:   [ ] Patient left in no apparent distress sitting up in chair  [X] Patient left in no apparent distress in bed  [X] Call bell left within reach  [ ] Nursing notified  [ ] Caregiver present  [ ] Bed alarm activated      Dona Velazquez PTA   Time Calculation: 26 mins
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
## 2017-03-16 NOTE — PROGRESS NOTES
Progress Note POD #1      Patient: Greg Londono               Sex: male          DOA: 3/15/2017         YOB: 1940      Surgery: Procedure(s):  C3-C7 POSTERIOR CERVICAL LAMINECTOMIES AND FUSION W/C-ARM         LOS:  LOS: 1 day               Subjective:     No new complaints, Denies arm pain. Pt & wife interested in Rehab placement. States he has not really been walking prior to surgery. Objective:      Visit Vitals    /73 (BP 1 Location: Left arm, BP Patient Position: At rest)    Pulse 89    Temp 98.5 °F (36.9 °C)    Resp 16    Ht 5' 4\" (1.626 m)    SpO2 95%       Physical Exam:  Neurological: motor strength: 5/5 in upper and lower extremities bilaterally                          sensation: intact to light touch    Intake and Output:  Current Shift:     Last three shifts:  03/14 1901 - 03/16 0700  In: 3072.2 [P.O.:25; I.V.:3047.2]  Out: 3165 [Urine:2575; Drains:190]    Lab/Data Reviewed:  Lab Results   Component Value Date/Time    WBC 7.8 03/16/2017 02:14 AM    HGB 12.7 03/16/2017 02:14 AM    HCT 39.5 03/16/2017 02:14 AM    PLATELET 861 93/85/0473 02:14 AM    MCV 92.1 03/16/2017 02:14 AM     Lab Results   Component Value Date/Time    aPTT 31.5 09/15/2011 02:37 AM     Lab Results   Component Value Date/Time    INR 1.1 09/15/2011 02:37 AM    Prothrombin time 13.7 09/15/2011 02:37 AM          Assessment/Plan     Principal Problem:    Cervical spinal stenosis (3/2/2017)    Active Problems:    DDD (degenerative disc disease), cervical (3/2/2017)        1. Stable  2. D/C PCA  3. D/C planning- SNF placement. 4. OOB  5. D/C owusu.

## 2017-03-17 VITALS
HEART RATE: 97 BPM | TEMPERATURE: 98.3 F | OXYGEN SATURATION: 96 % | DIASTOLIC BLOOD PRESSURE: 82 MMHG | SYSTOLIC BLOOD PRESSURE: 145 MMHG | HEIGHT: 64 IN | RESPIRATION RATE: 18 BRPM

## 2017-03-17 PROBLEM — M48.02 CERVICAL SPINAL STENOSIS: Status: RESOLVED | Noted: 2017-03-02 | Resolved: 2017-03-17

## 2017-03-17 LAB
ERYTHROCYTE [DISTWIDTH] IN BLOOD BY AUTOMATED COUNT: 12.3 % (ref 11.6–14.5)
HCT VFR BLD AUTO: 39.7 % (ref 36–48)
HGB BLD-MCNC: 13.1 G/DL (ref 13–16)
MCH RBC QN AUTO: 30.2 PG (ref 24–34)
MCHC RBC AUTO-ENTMCNC: 33 G/DL (ref 31–37)
MCV RBC AUTO: 91.5 FL (ref 74–97)
PLATELET # BLD AUTO: 141 K/UL (ref 135–420)
PMV BLD AUTO: 10 FL (ref 9.2–11.8)
RBC # BLD AUTO: 4.34 M/UL (ref 4.7–5.5)
WBC # BLD AUTO: 9 K/UL (ref 4.6–13.2)

## 2017-03-17 PROCEDURE — 97535 SELF CARE MNGMENT TRAINING: CPT

## 2017-03-17 PROCEDURE — 85027 COMPLETE CBC AUTOMATED: CPT | Performed by: PHYSICIAN ASSISTANT

## 2017-03-17 PROCEDURE — 97116 GAIT TRAINING THERAPY: CPT

## 2017-03-17 PROCEDURE — 36415 COLL VENOUS BLD VENIPUNCTURE: CPT | Performed by: PHYSICIAN ASSISTANT

## 2017-03-17 PROCEDURE — 74011250637 HC RX REV CODE- 250/637: Performed by: PHYSICIAN ASSISTANT

## 2017-03-17 RX ORDER — HYDROCODONE BITARTRATE AND ACETAMINOPHEN 5; 325 MG/1; MG/1
1-1.5 TABLET ORAL
Qty: 90 TAB | Refills: 0 | Status: SHIPPED | OUTPATIENT
Start: 2017-03-17 | End: 2018-06-06

## 2017-03-17 RX ADMIN — HYDROCODONE BITARTRATE AND ACETAMINOPHEN 1 TABLET: 7.5; 325 TABLET ORAL at 00:58

## 2017-03-17 RX ADMIN — METOPROLOL SUCCINATE 50 MG: 50 TABLET, EXTENDED RELEASE ORAL at 10:04

## 2017-03-17 RX ADMIN — DOCUSATE SODIUM 100 MG: 100 CAPSULE, LIQUID FILLED ORAL at 10:05

## 2017-03-17 RX ADMIN — ATORVASTATIN CALCIUM 20 MG: 20 TABLET, FILM COATED ORAL at 10:04

## 2017-03-17 RX ADMIN — LEVOTHYROXINE SODIUM 50 MCG: 50 TABLET ORAL at 07:14

## 2017-03-17 RX ADMIN — OMEPRAZOLE 20 MG: 20 CAPSULE, DELAYED RELEASE ORAL at 10:04

## 2017-03-17 RX ADMIN — OXYBUTYNIN CHLORIDE 15 MG: 5 TABLET, EXTENDED RELEASE ORAL at 10:04

## 2017-03-17 RX ADMIN — VALSARTAN 320 MG: 160 TABLET ORAL at 10:04

## 2017-03-17 RX ADMIN — HYDROCODONE BITARTRATE AND ACETAMINOPHEN 1 TABLET: 5; 325 TABLET ORAL at 07:14

## 2017-03-17 RX ADMIN — Medication 10 ML: at 06:03

## 2017-03-17 NOTE — PROGRESS NOTES
Problem: Self Care Deficits Care Plan (Adult)  Goal: *Acute Goals and Plan of Care (Insert Text)  Occupational Therapy Goals  Initiated 3/16/2017 within 7 day(s). 1. Patient will perform grooming with supervision/set-up 2. Patient will perform upper body dressing with supervision/set-up. 3. Patient will perform lower body dressing with supervision/set-up. 4. Patient will perform toilet transfers with moderate assistance . 5. Patient will perform all aspects of toileting with minimal assistance/contact guard assist.   Outcome: Progressing Towards Goal  OCCUPATIONAL THERAPY TREATMENT     Patient: Janine Armenta (89 y.o. male)  Date: 3/17/2017  Diagnosis: CERVICAL HERNIATED NUCLEUS PULPOSIS W/RADICULOPATHY C4-5,C5-6,C6-7,STENOSIS CERVICAL REGION,OSTEOPHYTE,CERVICALGIA,ELEVATED CHOLESTEROL,HYPERTENSION,REFLUX,THYROID DISORDER  Cervical spinal stenosis Cervical spinal stenosis  Procedure(s) (LRB):  C3-C7 POSTERIOR CERVICAL LAMINECTOMIES AND FUSION W/C-ARM (N/A) 2 Days Post-Op  Precautions: Fall, Spinal  Chart, occupational therapy assessment, plan of care, and goals were reviewed. ASSESSMENT:  Pt supine in bed upon entering, agreeable to therapy. Pt completed supine to sit transfer with min-mod A using log roll technique. While seated EOB, pt required max A to adjust socks. Pt completed sit to stand transfer with max Ax2. Pt required max A to pull briefs up around hips while standing with RW. Pt completed functional mobility with use of RW with mod A. Pt returned to supine in bed with min A for LE management. Pt left supine in bed with needs in reach. EDUCATION: safety  Progression toward goals:  [ ]          Improving appropriately and progressing toward goals  [X]          Improving slowly and progressing toward goals  [ ]          Not making progress toward goals and plan of care will be adjusted       PLAN:  Patient continues to benefit from skilled intervention to address the above impairments. Continue treatment per established plan of care. Discharge Recommendations:  Rehab  Further Equipment Recommendations for Discharge:  TBD by next level of care       SUBJECTIVE:   Patient stated .      OBJECTIVE DATA SUMMARY:      G CODES:       Cognitive/Behavioral Status:  Neurologic State: Alert  Orientation Level: Oriented to place, Oriented to person, Oriented to situation  Cognition: Follows commands  Safety/Judgement: Fall prevention  Functional Mobility and Transfers for ADLs:              Bed Mobility:  Supine to Sit: Minimum assistance; Moderate assistance  Sit to Supine: Minimum assistance                 Transfers:  Sit to Stand: Maximum assistance;Assist x2              Bathroom Mobility: Moderate assistance                 Balance:  Sitting: Intact; With support  Standing: Impaired; With support  Standing - Static: Fair  Standing - Dynamic : Fair  ADL Intervention:  Lower Body Dressing Assistance  Dressing Assistance: Maximum assistance  Pants With Elastic Waist: Maximum assistance  Socks: Maximum assistance  Leg Crossed Method Used: No  Position Performed: Standing;Seated edge of bed     Cognitive Retraining  Safety/Judgement: Fall prevention        Pain:  Pre Treatment: 3  Post Treatment: 6     Activity Tolerance:    good  Please refer to the flowsheet for vital signs taken during this treatment.   After treatment:   [ ]  Patient left in no apparent distress sitting up in chair  [X]  Patient left in no apparent distress in bed  [X]  Call bell left within reach  [X]  Nursing notified  [ ]  Caregiver present  [ ]  Bed alarm activated     Shraddha Perla MS OTR/L  Time Calculation: 16 mins

## 2017-03-17 NOTE — PROGRESS NOTES
Progress Note POD #2      Patient: Joan Bello               Sex: male          DOA: 3/15/2017         YOB: 1940      Surgery: Procedure(s):  C3-C7 POSTERIOR CERVICAL LAMINECTOMIES AND FUSION W/C-ARM         LOS:  LOS: 2 days               Subjective:     C/O cervical collar uncomfortable,otherwise doing well. Denies arm pain    Objective:      Visit Vitals    /78 (BP 1 Location: Left arm, BP Patient Position: At rest)    Pulse 92    Temp 99 °F (37.2 °C)    Resp 18    Ht 5' 4\" (1.626 m)    SpO2 96%       Physical Exam:  Neurological: motor strength: 5/5 in upper and lower extremities bilaterally                          sensation: intact to light touch    Intake and Output:  Current Shift:  03/17 0701 - 03/17 1900  In: -   Out: 100 [Urine:100]  Last three shifts:  03/15 1901 - 03/17 0700  In: 2608.4 [P.O.:985; I.V.:1623.4]  Out: 4240 [Urine:4000; Drains:240]    Lab/Data Reviewed:  Lab Results   Component Value Date/Time    WBC 9.0 03/17/2017 01:18 AM    HGB 13.1 03/17/2017 01:18 AM    HCT 39.7 03/17/2017 01:18 AM    PLATELET 563 14/87/9813 01:18 AM    MCV 91.5 03/17/2017 01:18 AM     Lab Results   Component Value Date/Time    aPTT 31.5 09/15/2011 02:37 AM     Lab Results   Component Value Date/Time    INR 1.1 09/15/2011 02:37 AM    Prothrombin time 13.7 09/15/2011 02:37 AM          Assessment/Plan     Principal Problem:    Cervical spinal stenosis (3/2/2017)    Active Problems:    DDD (degenerative disc disease), cervical (3/2/2017)        1. Stable  2. OOB  3. D/C to [facility] when bed available. 4. F/U at [facility] in 10 days  5. Change dressing daily  6.  D/C drain

## 2017-03-17 NOTE — DISCHARGE INSTRUCTIONS
Dr. Buster Covarrubias Operative Instructions: Cervical Fusion    *YOU MUST AVOID SMOKING OR BEING AROUND ANYONE WHO SMOKES. AVOID ALL PRODUCTS THAT CONTAIN NICOTINE. DO NOT TAKE IBUPROFEN OR ANTI--INFLAMMATORIES, AS THESE MAY ALTER THE HEALING OF THE FUSION. Diet:   1. Begin with liquids and light foods such as jell-o or soups  2. Advance as tolerated to your regular diet if not nauseated. 3.  Swallowing difficulties may be experienced up to 2 weeks post-operatively. Modify food thickness as necessary. You may also obtain over-the-counter chloraseptic spray. Medications:  1. Strong oral narcotic pain medications have been prescribed for the first few days. Use only as directed. No pain medication is capable of taking away all the pain. Taking your pills at regular intervals will give you the best chance of having less pain. 2. If you need a refill PLEASE PLAN AHEAD. Call our office during regular hours (8-5). 3. Do not combine with alcoholic beverages. 4. Be careful as you walk, climb stairs or drive as mild dizziness is not unusual.  5. Do not take medications that have not been prescribed by your surgeon. 6. You may switch to over the counter pain medication of your choice as you become more comfortable. Activity and Restrictions:  1. You should not drive until you are clear by your surgeon at your post-operative visit. 2.  In regards to your cervical collar, you MUST wear this at all times until your first follow-up appointment. 3.  You should wear the collar even when sleeping. 4.  It can be removed for short periods of time as long as you are keeping your head centered over the shoulders without rotating or looking up or down. 5. You may take short walks inside and outside of your home and climb stairs. 6. You are to avoid work, housework, yard work, snow shoveling, lifting of more than a few pounds, overhead work or strenuous activity.   7. Avoid any excessive stretching or range-of-motion of the neck. Non-painful range-of-motion of the neck is allowed  8. Follow-up with Dr. Cori Stubbs in 7-10 days. DRIVIN. You should not drive until after your follow-up appointment. 2.  You can be in a vehicle for short distances, but if you travel any long distance, please stop about every 30 minutes and walk/stretch. 3.  You should NEVER drive while taking narcotic medication. BATHING and INCISION CARE:  1. The incision may be tender to touch or feel numb: this is normal.   2.  Keep the incision clean and dry. Do not get the incision wet for 5 days. The incision will be closed with sutures under the skin and the skin will be glued. 3.  Do not apply any lotions, ointments or oils on the incision. 4.  If you notice any excessive swelling, redness, or persistent drainage around the incision, notify the office immediately. RETURN TO WORK :  1. The decision to return to work will be determined on an individual basis. 2.  Many people who have a strenuous job (construction, heavy labor, etc) may need to be off work for up to 8 weeks. 3.  If you need a work note, please let us know as soon as possible, and not the same day you are planning to return to work. NUTRITION :  1.  Good nutrition is an essential part of healing. 2.  You should eat a balanced diet each day, including fruits, vegetables, dairy products and protein. 3.  Remember to drink plenty of water. 4.  If you have not had a bowel movement within 3 days of surgery, you will need to use a laxative or suppository that can be obtained over-the-counter at your local pharmacy. BONE STIMULATOR:  1. A spinal bone stimulator may be prescribed for you under certain situations. 2.  A nurse will call you if one has been requested and discuss its use for approximately 4-6 months post-op every day. 3.  This device assists in bone healing and fusion.     CALL THE OFFICE:   If you have severe pain unrelieved by the medications, new numbness or tingling in your arms;   If you have a fever of 101.0°F or greater;    If you notice excessive swelling, redness, or persistent drainage from the incision or IV site; [facility] office number is [phone] from 8:00am to 5:00pm Monday through Friday. After 5:00pm, on weekends, or holidays, please leave a message with our answering service and the doctor on-call will get back to you shortly.

## 2017-03-17 NOTE — PROGRESS NOTES
2334-Resting in bed, eyes closed, arouses easily. Stable. Cervical collar in place. Denies numbness and tingling to extremities. Periodic restless, fidgety. Assisted with repositioning in bed. Urinary frequency, need assistance with using urinal.  Posterior neck dressing, intact, no drainage. Hemo vac in place; patent, draining. Bed alarm activated for safety. Incentive spirometer in use, assisted by this writer. Call light and personal items within reach. Assessment complete. 0323-CHRISTINA Lea at bedside. Stable. Denies pain at this time. Incentive spirometer volume with inhalation now at 1000; previously 1500, but patient was sitting up at that time verses being reclined in the bed. No other change from initial assessment. 0723-Sitting up in chair at bedside with wife in reclining chair. Shift Summary:  Bed alarm utilized during shift; gets agitated when having to void, \"doesn't want to wet the bed\". Stable. Intermittent dosing throughout shift. Uneventful shift.

## 2017-03-17 NOTE — ROUTINE PROCESS
Bedside and Verbal shift change report given to [address] [place] [address] (oncoming nurse) by Alfredito Buckley RN (offgoing nurse). Report included the following information SBAR, Kardex, MAR and Recent Results.

## 2017-03-17 NOTE — PROGRESS NOTES
Problem: Mobility Impaired (Adult and Pediatric)  Goal: *Acute Goals and Plan of Care (Insert Text)  In 1-7 days pt will be able to perform:  STG  1. Bed mobility: Demonstrate proper log-roll technique for safe initiation of rolling for OOB activities. 2. Supine to sit to supine S with HR for meals. 3. Sit to stand to sit S with RW cervical collar in prep for ambulation. LT. Gait: Ambulate 15ft S with RW cervical collar, for home/community mobility. 2. Activity tolerance: Tolerate up in chair 30 minutes-1 hour for ADLs. 3. Patient/Family Education: Patient/family to be independent with HEP for follow-up care and safe discharge. Outcome: Progressing Towards Goal  PHYSICAL THERAPY TREATMENT     Patient: Aniceto Almanza (77 y.o. male)  Date: 3/17/2017  Diagnosis: CERVICAL HERNIATED NUCLEUS PULPOSIS W/RADICULOPATHY C4-5,C5-6,C6-7,STENOSIS CERVICAL REGION,OSTEOPHYTE,CERVICALGIA,ELEVATED CHOLESTEROL,HYPERTENSION,REFLUX,THYROID DISORDER  Cervical spinal stenosis Cervical spinal stenosis  Procedure(s) (LRB):  C3-C7 POSTERIOR CERVICAL LAMINECTOMIES AND FUSION W/C-ARM (N/A) 2 Days Post-Op  Precautions: Fall, Spinal  Chart, physical therapy assessment, plan of care and goals were reviewed. ASSESSMENT:  Pt sleeping upon arrival and easily aroused. Pt requires additional time for all motor processing and mobility w/ noted need for manual initiation for scooting and for transitions sit to stand. Pt required min A x2 for sit to stand and stand to sit. Pt able to participate in gt training w/ RW, cervical collar, min A x1-2 w/ GB, and antalgic, ataxic, NBOS, delayed LE initiation w/ steps, forward flexed posture throughout. Pt returned to sidelying on L w/ pillow support, SCDs applied and pt given all needs within reach. Wife present throughout. Nurse Sophia Alcocer aware. Pt ready for transition to rehab today.   Progression toward goals:  [ ]      Improving appropriately and progressing toward goals  [X] Improving slowly and progressing toward goals  [ ]      Not making progress toward goals and plan of care will be adjusted       PLAN:  Patient continues to benefit from skilled intervention to address the above impairments. Continue treatment per established plan of care. Discharge Recommendations:  Rehab  Further Equipment Recommendations for Discharge:  N/A       SUBJECTIVE:   Patient stated I am uncomfortable.       OBJECTIVE DATA SUMMARY:   Critical Behavior:  Neurologic State: Alert  Orientation Level: Oriented to person, Oriented to place, Oriented to situation  Cognition: Follows commands  Safety/Judgement: Fall prevention  Functional Mobility Training:  Bed Mobility:  Rolling: Minimum assistance; Additional time (vc)  Supine to Sit: Minimum assistance; Additional time (vc)  Sit to Supine: Contact guard assistance;Minimum assistance; Additional time (vc)  Scooting: Minimum assistance;Contact guard assistance; Additional time (vc)  Transfers:  Sit to Stand: Minimum assistance;Assist x2; Additional time (vc)  Stand to Sit: Minimum assistance;Assist x2; Additional time (vc)  Balance:  Sitting: Intact  Sitting - Static: Fair (occasional)  Sitting - Dynamic: Fair (occasional)  Standing: Impaired; With support  Standing - Static: Fair;Constant support  Standing - Dynamic : Fair              Range Of Motion:  Ambulation/Gait Training:  Distance (ft): 60 Feet (ft)  Assistive Device: Walker, rolling;Gait belt;Brace/Splint  Ambulation - Level of Assistance: Minimal assistance;Assist x1;Assist x2; Additional time (vc)  Gait Abnormalities: Antalgic; Ataxic;Decreased step clearance;Shuffling gait; Step to gait; Path deviations (forward flexed posture)  Base of Support: Narrowed  Speed/Adela: Slow;Shuffled;Delayed  Step Length: Left shortened;Right shortened  Interventions: Safety awareness training; Tactile cues; Verbal cues;Manual cues  Neuro Re-Education:  Therapeutic Exercises:   Pain:  Pain Scale 1: Numeric (0 - 10)  Pain Intensity 1: 3 (pre session PT; 6/10 post PT)  Pain Location 1:  (cervical)  Pain Orientation 1: Posterior  Pain Description 1: Aching  Pain Intervention(s) 1: Medication (see MAR)  Activity Tolerance:   Fair   Please refer to the flowsheet for vital signs taken during this treatment.   After treatment:   [ ] Patient left in no apparent distress sitting up in chair  [X] Patient left in no apparent distress in bed  [X] Call bell left within reach  [X] Nursing notified  [ ] Caregiver present  [ ] Bed alarm activated      Warden Cabot, PT   Time Calculation: 16 mins

## 2017-03-17 NOTE — ROUTINE PROCESS
Bedside and Verbal shift change report given to Daxa Garcia RN (oncoming nurse) by Tucker Palm RN (offgoing nurse). Report included the following information SBAR and Kardex.

## 2017-03-17 NOTE — PROGRESS NOTES
2053: patient attempting to get out of bed to put his clothes on. Patient re-oriented to place and situation. Patient A, O x 3. Assessment done, incision to posterior neck, ABD pad and tape dressing clean, dry and intact. Cervical collar in place. Hemovac in place draining serosanguinous output. Patient rated pain 6/10, patient repositioned in bed, medicated with Norco per MAR. Patient assisted with urinal, urine clear yellow. BED ALARM ON FOR SAFETY. 2145: patient assisted with urinal in bed. Bedside and Verbal shift change report given to NINO Erazo (oncoming nurse) by Cammy Diamond RN (offgoing nurse). Report included the following information SBAR, Kardex and MAR.

## 2017-03-17 NOTE — DISCHARGE SUMMARY
Discharge Summary    Patient: Gil Foote               Sex: male          DOA: 3/15/2017         YOB: 1940      Age:  78 y.o.        LOS:  LOS: 2 days                Admit Date: 3/15/2017    Discharge Date: 3/17/2017    Admission Diagnoses: CERVICAL HERNIATED NUCLEUS PULPOSIS W/RADICULOPATHY C4-5,C5-6,C6-7,STENOSIS CERVICAL REGION,OSTEOPHYTE,CERVICALGIA,ELEVATED CHOLESTEROL,HYPERTENSION,REFLUX,THYROID DISORDER  Cervical spinal stenosis    Discharge Diagnoses:    Problem List as of 3/17/2017  Date Reviewed: 3/15/2017          Codes Class Noted - Resolved    DDD (degenerative disc disease), cervical ICD-10-CM: M50.30  ICD-9-CM: 722.4  3/2/2017 - Present        * (Principal)RESOLVED: Cervical spinal stenosis ICD-10-CM: M48.02  ICD-9-CM: 723.0  3/2/2017 - 3/17/2017              Discharge Condition: Good    Hospital Course: The patient underwent C3-7 posterior cervical decompression & fusion on 3/15/2017. The patient tolerated the procedure well. Vital signs remained stable and the patient was transferred to  2nd floor surgical unit without complications. The patient remained neurovascularly intact and began getting out of bed with physical therapy on  POD #1. Pain was controlled with no basal rate Dilaudid PCA and Norco pills for break through pain. The PCA pump and owusu catheter were discontinued on POD#1. The drain was discontinued on POD#2. Patient has history of CVA and right sided weakness and had been relatively nonambulatory preoperatively. Due to the slow progress with P.T, the decision for Rehab placement has been made and the patient will be transferred on POD #2. Transportation to Rehab will be required due to unsteady gait and need of assistance when ambulating. The patient progressed slowly with physical therapy and was ambulating 20 feet on POD #1 and was therefore transferred to [facility] the evening of POD#2.      Consults: None    Significant Diagnostic Studies:   Recent Labs 03/17/17 0118 03/16/17 0214   HGB  13.1  12.7*     Recent Labs      03/17/17 0118 03/16/17 0214   HCT  39.7  39.5       Current Discharge Medication List      CONTINUE these medications which have CHANGED    Details   HYDROcodone-acetaminophen (NORCO) 5-325 mg per tablet Take 1-1.5 Tabs by mouth every four (4) hours as needed for Pain. Max Daily Amount: 9 Tabs. Qty: 90 Tab, Refills: 0         CONTINUE these medications which have NOT CHANGED    Details   metoprolol succinate (TOPROL-XL) 50 mg XL tablet Take  by mouth daily. Indications: hypertension      atorvastatin (LIPITOR) 40 mg tablet Take  by mouth nightly. Indications: hypercholesterolemia      FOLIC ACID/MV,FE,OTHER MIN (CENTRUM PO) Take  by mouth daily. omeprazole (PRILOSEC) 20 mg capsule Take 20 mg by mouth daily. Indications: GASTROESOPHAGEAL REFLUX      levothyroxine (SYNTHROID) 50 mcg tablet Take 50 mcg by mouth Daily (before breakfast). Indications: hypothyroidism      valsartan (DIOVAN) 320 mg tablet Take 320 mg by mouth daily. Indications: hypertension      oxybutynin chloride XL (DITROPAN XL) 15 mg CR tablet Take 15 mg by mouth daily. Indications: INCREASED URINARY FREQUENCY         STOP taking these medications       acetaminophen (TYLENOL ARTHRITIS PAIN) 650 mg CR tablet Comments:   Reason for Stopping:         aspirin delayed-release (ECOTRIN) 325 mg tablet Comments:   Reason for Stopping:               Activity: activity as tolerated, weight bearing as tolerated. Wear cervical collar at all times. No lifting over 20 pounds. No anti- inflammatory medications for 3 months. Use stool softeners at home while taking pain medications since  they are constipating. Diet: Regular Diet    Wound Care: Keep wound clean and dry, Reinforce dressing PRN and ice to area for comfort. Do not get wound wet for 5 days.     Follow-up: 10 days with Dr Watson Garcia

## 2017-04-03 NOTE — PROGRESS NOTES
[facility] at [address] [place] [address]  Phone: [phone]   Fax: [phone]    Discharge Summary    Patient name: Isabelle Schmidt     Start of Care: 2016  Referral source: Crystal Sarmiento.,*    : 1940  Medical/Treatment Diagnosis: Cervical myelopathy ([address] [G95.9]  Onset Date:Summer 2016  Prior Hospitalization: see medical history   Provider#: [id number]  Comorbidities: TIA's 4500-2949, arthritis, HTN, GERD, lumbar stenosis L4-L5-S1; L TKA   Prior Level of Function: independent ambulation with SPC, chronic weakness related to previous TIA's and L TKA limiting long distances. Medications: Verified on Patient Summary List    Visits from Start of Care: 11    Missed Visits: 3  Reporting Period : 2017 to 4/3/2017    1. Improve FOTO score to >/= 59/100 to indicate decreased pain with ADL's. Status at eval: 37/700  Status at recert: Not met, 39  Current Status: not met: 35/100      2. Pt will decrease TUG to 20 seconds in order to promote decreased risk for fall with ambulation. Status at eval: 27 seconds with SPC and supervision  Status at recert: 31 seconds with SPC and supervision  Current Status: not met: 28 seconds with 4WW and supervision      3. The pt will be able to maintain modified tandem stance bilaterally for at least 15 seconds to improve his safety with bathing and household chores. Status at eval: unable to hold  Status at recert: only able to maintain for 5 seconds  Current Status: progressing: 10 seconds bilaterally      Assessment/ Summary of Care:   Patient was making slow progress toward goals set with continued reports of hip pain. At last visit, patient reported that se was going to be getting second opinion about bilateral hip issues from orthopedic.  Patient followed up after last visit by calling to report he was scheduled to have surgery 3/15/2017 so PT discussed with patient plans to discharge until patient indicates he is able and cleared to return post surgery.       G-Codes (GP)  Mobility   Current CL= 60-79% [id number] Goal CK= 40-59%     RECOMMENDATIONS:  [x]Discontinue therapy: []Patient has reached or is progressing toward set goals      [x]Patient is non-compliant or has abdicated      [x]Due to lack of appreciable progress towards set goals    Donell Joyce 4/3/2017 2:15 PM

## 2017-04-12 ENCOUNTER — HOSPITAL ENCOUNTER (OUTPATIENT)
Dept: PHYSICAL THERAPY | Age: 77
Discharge: HOME | End: 2017-04-12
Payer: MEDICARE

## 2017-04-12 PROCEDURE — G8979 MOBILITY GOAL STATUS: HCPCS

## 2017-04-12 PROCEDURE — 97162 PT EVAL MOD COMPLEX 30 MIN: CPT

## 2017-04-12 PROCEDURE — G8978 MOBILITY CURRENT STATUS: HCPCS
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
## 2017-04-12 NOTE — PROGRESS NOTES
PT DAILY TREATMENT NOTE - [facility]     Patient Name: Cynthia Bolivar  Date:2017  : 1940  [x]  Patient  Verified  Payor: Lily Grajeda / Plan: [place] MED[place]ARE PART A & B / Product Type: Medicare /    In time:11:00  Out time:11:35  Total Treatment Time (min): 35  Total Timed Codes (min): 35  1:1 Treatment Time ( only): 35   Visit #: 1 of 16    Treatment Area: Neck pain [M54.2]    SUBJECTIVE  Pain Level (0-10 scale): 4/10  Any medication changes, allergies to medications, adverse drug reactions, diagnosis change, or new procedure performed?: [x] No    [] Yes (see summary sheet for update)  Subjective functional status/changes:   [] No changes reported  See POC    OBJECTIVE/EXAMINATION    35 min [x]Eval                  []Re-Eval       Physical Therapy Evaluation  Neurologic    FOTO: 38 FS Primary Measure    Posture: [x] Poor    [] Fair    [] Good    Describe: Forward trunk posture      Gait: [] Normal    [x] Abnormal    Device: Rolling walker, also patient owns rollator     Describe: Patient ambulates demonstrating poor foot clearance R LE secondary to foot drag even with solid AFO present.  Poor swing through bilaterally with decreased SLS in gait cycle on R LE    ROM:                             AROM    PROM   Shoulder Left Right Left Right   Flex Only to 90  Per PT Only to 90 per PT     Ext       ABD Only to 90 per PT Only to 90 per PT     ER       IR         Strength (MMT): Gross MMT in seated position  Shoulder L (1-5) R (1-5)   Shoulder Flexion 4- 4-   Shoulder Ext     Shoulder ABD 4- 4-   Shoulder ADD     Shoulder IR 4- 4-   Shoulder ER 4- 4-                                             Hip L (1-5) R (1-5)   Hip Flexion 3+ 3-   Hip Ext 3- at least 3- at least   Hip ABD 4- 3+   Hip ADD 4- 3+   Hip ER     Hip IR       Knee L (1-5) R (1-5)   Knee Flexion     Knee Extension     Ankle PF     Ankle DF     Other  Foot drop on R side with solid AFO present     Reflexes: [x] Not Tested   Left Right   Biceps (C5)     Brachioradiais (C6)     Triceps (C7)     Knee Jerk (L4)     Ankle Jerk (SI)       Balance/ Equilibrium:              Left            Right  Tracks Across Midline      Reaches Across Midline           Sitting Balance: Static:  [x] Good    [] Fair    [] Poor     Dynamic:   [x] Good    [] Fair    [] Poor        Standing Balance: Static:   [] Good    [x] Fair    [] Poor     Dynamic:   [] Good    [] Fair    [x] Poor        Protective Extension:  [x] Present    [] Delayed    [] Absent    Functional Mobility            Transfers:       Sit-Stand: 4X in 30 seconds with use of bilateral arm rests        Behavior: [x] Cooperative    [] Impulsive    [] Agitated    [] Perseverative    [] Confused   Oriented x:4    Cognition: [] One Step Commands   [x] Multiple Commands   [] Displays Neglect [] R  [] L    Optional Tests:       TUG score: 35.33 seconds with three trials using rolling walker  Rhomberg EO: Feet together =>30 seconds, Tandem 4 seconds with LOB posteriorly   Rhomberg EC: Feet together =>30 seconds however increased unsteadiness noted    Active Movements: [x] N/A   [] Too acute   [] Other:Patient still has [employer] in place s/p surgery  ROM % AROM % PROM Comments:pain, area   Forward flexion      Extension      SB right      SB left       Rotation right      Rotation left        Pain Level (0-10 scale) post treatment: 4/10    ASSESSMENT/Changes in Function:   Patient is a 78year old male who presents s/p cervical fusion of C4-C7 and difficulty with ambulation following sudden onset of weakness as a secondary issue with cervical DDD last year. Patient is currently on a 20 pound lifting restriction and wearing a [place] J Collar. He had inpatient physical therapy for three weeks prior to discharge to begin outpatient therapy. Patient was lifting up to 10 pounds with AAROM at time of discharge.  He is ambulating with a rolling walker with poor foot clearance noted on R LE even with solid ankle AFO on relying on bilateral arms heavily for support. Examination indicated patient has bilateral UE/LE weakness with R LE especially affected, poor forward trunk posture, and poor balance/proprioception increasing patient's risk for falls. Patient has denied falls in the past 6 months however. Also, he is able to perform all ADLs without assistance from his wife. Patient would benefit from skilled PT services to address deficits and improve patient's functional mobility to be able to return to ambulation with least restrictive assistive device and decrease risk of falls. Once patient is able to come off of cervical collar and restrictions with cervical region, deficits will be assessed and added to goals/POC to address accordingly. [x]  See Plan of Care  []  See progress note/recertification  []  See Discharge Summary         Progress towards goals / Updated goals:  Short Term Goals: To be accomplished in 4 weeks:  1. Patient will demonstrate compliance with HEP to be able to continue with maintenance program following discharge  Status @ Eval: to be provided next visit  2. Patient will have decrease TUG score to 20 seconds indicating decrease risk for falls. Status @ Eval: TUG 35.33 seconds with rolling walker  3. Patient will have increased Rhomberg Balance with EO Tandem to 15 seconds indicating improvement in balance decreasing risk for falls. Status @ Eval: Rhomberg EO Tandem 4 seconds  4. Patient will be able to perform sit to stands 6X within 30 seconds to be able to transition quickly from sit to standing while ambulating in household  Status @ Eval: 4x     Long Term Goals: To be accomplished in 8 weeks:  1. Patient will be independent with HEP at time of discharge to be able to continue with maintenance program following discharge. Status @ Eval: to be provided next visit  2. Patient will have decrease TUG score to =<12 seconds indicating low risk for falls.   Status @ Eval: TUG 35.33 seconds with rolling walker  3. Patient will have increased Rhomberg Balance with EO Tandem to 30 seconds indicating improvement in balance systems not requiring heavy dependability on vision decreasing risk for falls  Status @ Eval: Rhomberg EO Tandem 4 seconds  4. Patient will have increased change score by 14 points on FOTO FS Primary Measure to 52 indicating improvement in overall mobility   Status @ Eval: 38  5. Patient will have increased Gross UE strength to 4/5 at least to be able to assist with lifting items such as laundry  Status @ Eval: Gross UE strength 4-/5  6. Patient will have increased LE gross strength by 1/2 to 1 grade to be able to ambulate with least restrictive assistive device. Status @ Eval: Patient ambulating with rolling walker    PLAN  []  Upgrade activities as tolerated     [x]  Continue plan of care  []  Update interventions per flow sheet       []  Discharge due to:_  []  Other:_      Vanesa Gomez 4/12/2017  10:47 AM     No future appointments.

## 2017-04-18 ENCOUNTER — APPOINTMENT (OUTPATIENT)
Dept: PHYSICAL THERAPY | Age: 77
End: 2017-04-18
Payer: MEDICARE

## 2017-04-19 ENCOUNTER — APPOINTMENT (OUTPATIENT)
Dept: PHYSICAL THERAPY | Age: 77
End: 2017-04-19
Payer: MEDICARE

## 2017-04-20 ENCOUNTER — HOSPITAL ENCOUNTER (OUTPATIENT)
Dept: PHYSICAL THERAPY | Age: 77
Discharge: HOME | End: 2017-04-20
Payer: MEDICARE

## 2017-04-20 PROCEDURE — 97110 THERAPEUTIC EXERCISES: CPT

## 2017-04-20 NOTE — PROGRESS NOTES
PT DAILY TREATMENT NOTE - [facility]     Patient Name: Everett Garcia  Date:2017  : 1940  [x]  Patient  Verified  Payor: VA MEDICARE / Plan: [place] MEDICARE PART A & B / Product Type: Medicare /    In time:02:00  Out time:2:50  Total Treatment Time (min): 50  Total Timed Codes (min): 50  1:1 Treatment Time ( [address] only): 50   Visit #: 2 of 16    Treatment Area: Neck pain [M54.2]    SUBJECTIVE  Pain Level (0-10 scale): 0/10  Any medication changes, allergies to medications, adverse drug reactions, diagnosis change, or new procedure performed?: [x] No    [] Yes (see summary sheet for update)  Subjective functional status/changes:   [x] No changes reported      OBJECTIVE    50 min Therapeutic Exercise:  [x] See flow sheet :   Rationale: increase ROM, increase strength, improve coordination and improve balance to improve the patients ability to ambulate without walker          With   [] TE   [] TA   [] neuro   [] other: Patient Education: [x] Review HEP    [] Progressed/Changed HEP based on:   [] positioning   [] body mechanics   [] transfers   [] heat/ice application    [] other:      Other Objective/Functional Measures:   None assessed     Pain Level (0-10 scale) post treatment: 0/10    ASSESSMENT/Changes in Function:   Patient required SBA/CGA with balance and functional activities such as transfers for safety and LOB. Patient did show good reflexive response. PT had patient take seated breaks intermittently even though he was persistent to push himself and do more as he demonstrated noticeable fatigue especially LE. Patient will continue to benefit from skilled PT services to modify and progress therapeutic interventions, address functional mobility deficits, address ROM deficits, address strength deficits and analyze and address soft tissue restrictions to attain remaining goals.      []  See Plan of Care  []  See progress note/recertification  []  See Discharge Summary         PLAN  []  Upgrade activities as tolerated     [x]  Continue plan of care  []  Update interventions per flow sheet       []  Discharge due to:_  []  Other:_      Nancy Alt 4/20/2017  1:56 PM    Future Appointments  Date Time Provider Caprice Leal   4/20/2017 2:00 PM [place] [facility]   4/21/2017 12:00 PM [place] [facility]   4/24/2017 1:00 PM AMADOR López [facility]   4/25/2017 1:30 PM [place] [facility]   5/2/2017 10:30 AM LIO Light [facility]   5/5/2017 12:30 PM [place] [facility]

## 2017-04-21 ENCOUNTER — HOSPITAL ENCOUNTER (OUTPATIENT)
Dept: PHYSICAL THERAPY | Age: 77
Discharge: HOME | End: 2017-04-21
Payer: MEDICARE

## 2017-04-21 PROCEDURE — 97110 THERAPEUTIC EXERCISES: CPT

## 2017-04-21 NOTE — PROGRESS NOTES
PT DAILY TREATMENT NOTE - [facility]     Patient Name: Constantine Goddard  Date:2017  : 1940  [x]  Patient  Verified  Payor: Ciarra Everett / Plan: VA MEDICARE PART A & B / Product Type: Medicare /    In time:12:00  Out time:12:54  Total Treatment Time (min): 54  Total Timed Codes (min): 50  1:1 Treatment Time ([facility] only): 50   Visit #: 3 of 16    Treatment Area: Neck pain [M54.2]    SUBJECTIVE  Pain Level (0-10 scale): 0/10  Any medication changes, allergies to medications, adverse drug reactions, diagnosis change, or new procedure performed?: [x] No    [] Yes (see summary sheet for update)  Subjective functional status/changes:   [] No changes reported  No new complaints    OBJECTIVE    54 min Therapeutic Exercise:  [] See flow sheet :   Rationale: increase ROM, increase strength, improve coordination and improve balance to improve the patients ability to ambulate with least restrictive assistive device    Other Objective/Functional Measures:   Rhomberg: EC 30 seconds Feet together bilaterally  EO Tandem 15 seconds before required assist to recover   TUG 31.5 average seconds with walker     Pain Level (0-10 scale) post treatment: 0/10    ASSESSMENT/Changes in Function:   Patient demonstrating increased tolerance with standing activities not relying on walker. Performed step overs and able to clear increasing step length of right LE. Patient will continue to benefit from skilled PT services to modify and progress therapeutic interventions, address functional mobility deficits, address ROM deficits and address strength deficits to attain remaining goals.      []  See Plan of Care  []  See progress note/recertification  []  See Discharge Summary    PLAN  []  Upgrade activities as tolerated     [x]  Continue plan of care  []  Update interventions per flow sheet       []  Discharge due to:_  []  Other:_      Darleen Gentile 2017  12:27 PM    Future Appointments  Date Time Provider Caprice Leal 4/24/2017 1:00 PM Staci Gosselin, PTA [facility]   4/25/2017 2:30 PM Cruzito Agustin, PTA [facility]   5/2/2017 10:30 AM Cruzito Agustin, PTA [facility]   5/5/2017 12:30 PM [place] [facility]   5/10/2017 5:00 PM Cruzito Agustin, PTA [facility]   5/12/2017 9:30 AM Cruzito Agustin, PTA [facility]   5/16/2017 1:30 PM Cruzito Agustin, PTA [facility]   5/18/2017 10:00 AM Cruzito Agustin, PTA [facility]

## 2017-04-24 ENCOUNTER — HOSPITAL ENCOUNTER (OUTPATIENT)
Dept: PHYSICAL THERAPY | Age: 77
Discharge: HOME | End: 2017-04-24
Payer: MEDICARE

## 2017-04-24 PROCEDURE — 97112 NEUROMUSCULAR REEDUCATION: CPT

## 2017-04-24 NOTE — PROGRESS NOTES
PT DAILY TREATMENT NOTE - [facility]     Patient Name: Luke Lee  Date:2017  : 1940  [x]  Patient  Verified  Payor: Daxa Romero / Plan: [place] MEDICARE PART A & B / Product Type: Medicare /    In time:100  Out time:135  Total Treatment Time (min): 35  Total Timed Codes (min): 35  1:1 Treatment Time ( [address] only): 35   Visit #: 4 of 16    Treatment Area: Neck pain [M54.2]    SUBJECTIVE  Pain Level (0-10 scale): 0/10  Any medication changes, allergies to medications, adverse drug reactions, diagnosis change, or new procedure performed?: [x] No    [] Yes (see summary sheet for update)  Subjective functional status/changes:   [] No changes reported  Pt reports that he is feeling fine today    OBJECTIVE        35 min Neuromuscular Re-education:  [x]  See flow sheet :added step overs to promote normal gait   Rationale: increase ROM, increase strength, improve coordination, improve balance and increase proprioception  to improve the patients ability to ambulate safely with heel strike bilaterally          With   [] TE   [] TA   [x] neuro   [] other: Patient Education: [x] Review HEP    [] Progressed/Changed HEP based on:   [] positioning   [x] body mechanics   [x] transfers   [] heat/ice application    [] other:      Other Objective/Functional Measures:      Pain Level (0-10 scale) post treatment: 0/10    ASSESSMENT/Changes in Function: Pt demonstrates limited strength in bilateral legs, with LOB in narrowed stance and forward trunk contributing to ongoing balance issues. Pt educated on need to stand upright and provided with numerous verbal and tactile cues to correct. Patient will continue to benefit from skilled PT services to address functional mobility deficits, address ROM deficits, address strength deficits, address imbalance/dizziness and instruct in home and community integration to attain remaining goals.      []  See Plan of Care  []  See progress note/recertification  []  See Discharge Summary         Progress towards goals / Updated goals:  Short Term Goals: To be accomplished in 4 weeks:  1. Patient will demonstrate compliance with HEP to be able to continue with maintenance program following discharge  Status @ Eval: to be provided next visit  2. Patient will have decrease TUG score to 20 seconds indicating decrease risk for falls. Status @ Eval: TUG 35.33 seconds with rolling walker  3. Patient will have increased Rhomberg Balance with EO Tandem to 15 seconds indicating improvement in balance decreasing risk for falls. Status @ Eval: Rhomberg EO Tandem 4 seconds  4. Patient will be able to perform sit to stands 6X within 30 seconds to be able to transition quickly from sit to standing while ambulating in household  Status @ Eval: 4x  Current: Progressing with sit to stand however not in 30 seconds     Long Term Goals: To be accomplished in 8 weeks:  1. Patient will be independent with HEP at time of discharge to be able to continue with maintenance program following discharge. Status @ Eval: to be provided next visit  2. Patient will have decrease TUG score to =<12 seconds indicating low risk for falls. Status @ Eval: TUG 35.33 seconds with rolling walker  3. Patient will have increased Rhomberg Balance with EO Tandem to 30 seconds indicating improvement in balance systems not requiring heavy dependability on vision decreasing risk for falls  Status @ Eval: Rhomberg EO Tandem 4 seconds  Current: Rhomberg EO Tandem 10 seconds  4.  Patient will have increased change score by 14 points on FOTO FS Primary Measure to 52 indicating improvement in overall mobility   Status @ Eval: 38    PLAN  []  Upgrade activities as tolerated     [x]  Continue plan of care  []  Update interventions per flow sheet       []  Discharge due to:_  []  Other:_      Gallo Allen PTA 4/24/2017  2:08 PM    Future Appointments  Date Time Provider Caprice Leal   4/25/2017 2:30 PM Amarilis Mckeon PTA [facility] 5/2/2017 10:30 AM Rochelle Oz PTA [facility]   5/5/2017 12:30 PM [place] [facility]   5/10/2017 4:30 PM [place] [facility]   5/12/2017 12:00 PM [place] [facility]   5/16/2017 1:30 PM Rochelle Oz PTA [facility]   5/18/2017 9:30 AM [place] [facility]

## 2017-04-25 ENCOUNTER — HOSPITAL ENCOUNTER (OUTPATIENT)
Dept: PHYSICAL THERAPY | Age: 77
Discharge: HOME | End: 2017-04-25
Payer: MEDICARE

## 2017-04-25 PROCEDURE — 97112 NEUROMUSCULAR REEDUCATION: CPT

## 2017-04-25 PROCEDURE — 97110 THERAPEUTIC EXERCISES: CPT

## 2017-04-25 NOTE — PROGRESS NOTES
PT DAILY TREATMENT NOTE - [facility]     Patient Name: Breonna Lopes  Date:2017  : 1940  [x]  Patient  Verified  Payor: Maria Esther Flowers / Plan: [place] MED[place]ARE PART A & B / Product Type: Medicare /    In time:5  Out time:1515  Total Treatment Time (min): 50  Total Timed Codes (min): 50  1:1 Treatment Time ( [address] only): 50   Visit #: 5 of 16    Treatment Area: Neck pain [M54.2]    SUBJECTIVE  Pain Level (0-10 scale): 0/10  Any medication changes, allergies to medications, adverse drug reactions, diagnosis change, or new procedure performed?: [x] No    [] Yes (see summary sheet for update)  Subjective functional status/changes:   [] No changes reported  I am not taking any pain meds. It's hard to pick your foot up when you haven't done that for so long.     OBJECTIVE    Modality rationale:    Min Type Additional Details    [] Estim:  []Unatt       []IFC  []Premod                        []Other:  []w/ice   []w/heat  Position:  Location:    [] Estim: []Att    []TENS instruct  []NMES                    []Other:  []w/US   []w/ice   []w/heat  Position:  Location:    []  Traction: [] Cervical       []Lumbar                       [] Prone          []Supine                       []Intermittent   []Continuous Lbs:  [] before manual  [] after manual    []  Ultrasound: []Continuous   [] Pulsed                           []1MHz   []3MHz W/cm2:  Location:    []  Iontophoresis with dexamethasone         Location: [] Take home patch   [] In clinic    []  Ice     []  heat  []  Ice massage  []  Laser   []  Anodyne Position:  Location:    []  Laser with stim  []  Other:  Position:  Location:    []  Vasopneumatic Device Pressure:       [] lo [] med [] hi   Temperature: [] lo [] med [] hi   [] Skin assessment post-treatment:  []intact []redness- no adverse reaction    []redness  adverse reaction:      min []Eval                  []Re-Eval       40 min Therapeutic Exercise:  [x] See flow sheet :   Rationale: increase ROM, increase strength and improve coordination to improve the patients ability to improve gait and ADL's     min Therapeutic Activity:  []  See flow sheet :        10 min Neuromuscular Re-education:  [x]  See flow sheet :static and dynamic balance   Rationale: increase ROM, increase strength, improve coordination, improve balance and increase proprioception  to improve the patients ability to improve balance and safety with gait     min Manual Therapy:          min Gait Training:  ___ feet with ___ device on level surfaces with ___ level of assist   Rationale: With   [] TE   [] TA   [] neuro   [] other: Patient Education: [x] Review HEP    [] Progressed/Changed HEP based on:   [] positioning   [] body mechanics   [] transfers   [] heat/ice application    [] other:      Other Objective/Functional Measures:      Pain Level (0-10 scale) post treatment: 0/10    ASSESSMENT/Changes in Function: Pt continues to wear cervical collar with limited UE lifting. Pt demonstrates posterior weight shift with static ba;naseem and is unable to clear right foot in swing phase of gait while wearing AFO. Patient will continue to benefit from skilled PT services to modify and progress therapeutic interventions, address functional mobility deficits, address ROM deficits, address strength deficits, analyze and address soft tissue restrictions, analyze and cue movement patterns, analyze and modify body mechanics/ergonomics, assess and modify postural abnormalities, address imbalance/dizziness and instruct in home and community integration to attain remaining goals.      [x]  See Plan of Care  []  See progress note/recertification  []  See Discharge Summary           PLAN  [x]  Upgrade activities as tolerated     [x]  Continue plan of care  []  Update interventions per flow sheet       []  Discharge due to:_  []  Other:_      Consuelo Fontanez, LIO 4/25/2017  2:24 PM    Future Appointments  Date Time Provider Caprice Leal   4/25/2017 2:30 PM Amarilis Mckeon PTA [facility]   5/2/2017 10:30 AM Amarilis Mckeon PTA [facility]   5/5/2017 12:30 PM [place] [facility]   5/10/2017 4:30 PM [place] [facility]   5/12/2017 12:00 PM [place] [facility]   5/16/2017 1:30 PM Amarilis Mckeon PTA [facility]   5/18/2017 9:30 AM Amarilis Mckeon PTA [facility]

## 2017-05-02 ENCOUNTER — HOSPITAL ENCOUNTER (OUTPATIENT)
Dept: PHYSICAL THERAPY | Age: 77
Discharge: HOME | End: 2017-05-02
Payer: MEDICARE

## 2017-05-02 PROCEDURE — 97110 THERAPEUTIC EXERCISES: CPT

## 2017-05-02 PROCEDURE — 97112 NEUROMUSCULAR REEDUCATION: CPT

## 2017-05-02 NOTE — PROGRESS NOTES
PT DAILY TREATMENT NOTE - [facility]     Patient Name: Adarsh Powell  Date:2017  : 1940  [x]  Patient  Verified  Payor: [place] MEDICARE / Plan: [place] MEDICARE PART A & B / Product Type: Medicare /    In time:1028  Out time:1120  Total Treatment Time (min): 52  Total Timed Codes (min): 52  1:1 Treatment Time ( only): 46   Visit #: 6 of 16    Treatment Area: Neck pain [M54.2]    SUBJECTIVE  Pain Level (0-10 scale): 3/10 right LE  Any medication changes, allergies to medications, adverse drug reactions, diagnosis change, or new procedure performed?: [x] No    [] Yes (see summary sheet for update)  Subjective functional status/changes:   [] No changes reported  I got the neck brace off yesterday. My right knee is bothering me.     OBJECTIVE    Modality rationale:    Min Type Additional Details    [] Estim:  []Unatt       []IFC  []Premod                        []Other:  []w/ice   []w/heat  Position:  Location:    [] Estim: []Att    []TENS instruct  []NMES                    []Other:  []w/US   []w/ice   []w/heat  Position:  Location:    []  Traction: [] Cervical       []Lumbar                       [] Prone          []Supine                       []Intermittent   []Continuous Lbs:  [] before manual  [] after manual    []  Ultrasound: []Continuous   [] Pulsed                           []1MHz   []3MHz W/cm2:  Location:    []  Iontophoresis with dexamethasone         Location: [] Take home patch   [] In clinic    []  Ice     []  heat  []  Ice massage  []  Laser   []  Anodyne Position:  Location:    []  Laser with stim  []  Other:  Position:  Location:    []  Vasopneumatic Device Pressure:       [] lo [] med [] hi   Temperature: [] lo [] med [] hi   [] Skin assessment post-treatment:  []intact []redness- no adverse reaction    []redness  adverse reaction:      min []Eval                  []Re-Eval       40 min Therapeutic Exercise:  [x] See flow sheet :   Rationale: increase ROM, increase strength and improve coordination to improve the patients ability to improve gait and ADL's     min Therapeutic Activity:  []  See flow sheet :        12 min Neuromuscular Re-education:  [x]  See flow sheet :static and dynamic balance   Rationale: increase strength, improve coordination, improve balance and increase proprioception  to improve the patients ability to improve balance for safety with gait     min Manual Therapy:          min Gait Training:  ___ feet with ___ device on level surfaces with ___ level of assist   Rationale: With   [] TE   [] TA   [] neuro   [] other: Patient Education: [x] Review HEP    [] Progressed/Changed HEP based on:   [] positioning   [] body mechanics   [] transfers   [] heat/ice application    [] other:      Other Objective/Functional Measures: see goal review     Pain Level (0-10 scale) post treatment: 2/10    ASSESSMENT/Changes in Function: Pt has demonstrated some improvements with transfers and continues to ambulate using RW with limited right foot clearance in swing phase while using AFO. Patient will continue to benefit from skilled PT services to modify and progress therapeutic interventions, address functional mobility deficits, address ROM deficits, address strength deficits, analyze and address soft tissue restrictions, analyze and cue movement patterns, analyze and modify body mechanics/ergonomics, assess and modify postural abnormalities, address imbalance/dizziness and instruct in home and community integration to attain remaining goals. [x]  See Plan of Care  []  See progress note/recertification  []  See Discharge Summary         Progress towards goals / Updated goals:  Short Term Goals: To be accomplished in 4 weeks:  1. Patient will demonstrate compliance with HEP to be able to continue with maintenance program following discharge  Status @ Eval: to be provided next visit  Current:Issued HEP today  2.  Patient will have decrease TUG score to 20 seconds indicating decrease risk for falls. Status @ Eval: TUG 35.33 seconds with rolling walker  Current:TUG 30.0 seconds with rolling walker  3. Patient will have increased Rhomberg Balance with EO Tandem to 15 seconds indicating improvement in balance decreasing risk for falls. Status @ Eval: Rhomberg EO Tandem 4 seconds  Current: no change  4. Patient will be able to perform sit to stands 6X within 30 seconds to be able to transition quickly from sit to standing while ambulating in household  Status @ Eval: 4x  Current: 5 times in 30 sec      Long Term Goals: To be accomplished in 8 weeks:  1. Patient will be independent with HEP at time of discharge to be able to continue with maintenance program following discharge. Status @ Eval: to be provided next visit  Current: HEP issued today  2. Patient will have decrease TUG score to =<12 seconds indicating low risk for falls. Status @ Eval: TUG 35.33 seconds with rolling walker  Current: 30 sec  3. Patient will have increased Rhomberg Balance with EO Tandem to 30 seconds indicating improvement in balance systems not requiring heavy dependability on vision decreasing risk for falls  Status @ Eval: Rhomberg EO Tandem 4 seconds  Current: Rhomberg EO Tandem 10 seconds  Current: no change  4. Patient will have increased change score by 14 points on FOTO FS Primary Measure to 52 indicating improvement in overall mobility   Status @ Eval: 38  Current: 44/100       PLAN  []  Upgrade activities as tolerated     []  Continue plan of care  []  Update interventions per flow sheet       []  Discharge due to:_  [x]  Other:_Progress cervical mobility next visit and assess HEP for balance and gait.       Oralia Yates PTA 5/2/2017  10:42 AM    Future Appointments  Date Time Provider Caprice Leal   5/5/2017 12:30 PM [place] [facility]   5/10/2017 4:30 PM Vinod [facility]   [facility]   5/16/2017 1:30 PM Oralia Yates PTA [facility] 5/18/2017 9:30 AM [facility]

## 2017-05-05 ENCOUNTER — HOSPITAL ENCOUNTER (OUTPATIENT)
Dept: PHYSICAL THERAPY | Age: 77
Discharge: HOME | End: 2017-05-05
Payer: MEDICARE

## 2017-05-05 PROCEDURE — 97116 GAIT TRAINING THERAPY: CPT

## 2017-05-05 PROCEDURE — 97110 THERAPEUTIC EXERCISES: CPT
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
## 2017-05-05 NOTE — PROGRESS NOTES
PT DAILY TREATMENT NOTE - [facility]     Patient Name: Eugenia Barron  Date:2017  : 1940  [x]  Patient  Verified  Payor: [place] MEDICARE / Plan: [place] MEDICARE PART A & B / Product Type: Medicare /    In time:12:20  Out time:01:10  Total Treatment Time (min): 40  Total Timed Codes (min): 40  1:1 Treatment Time ( only): 40   Visit #: 7 of 8    Treatment Area: Neck pain [M54.2]    SUBJECTIVE  Pain Level (0-10 scale): 0/10  Any medication changes, allergies to medications, adverse drug reactions, diagnosis change, or new procedure performed?: [x] No    [] Yes (see summary sheet for update)  Subjective functional status/changes:   [] No changes reported  Patient no longer wearing collar and has no restrictions. OBJECTIVE    25 min Therapeutic Exercise:  [x] See flow sheet :   Rationale: increase ROM, increase strength, improve coordination and improve balance to improve the patients ability to ambulate with cane       15 min Gait Training:  _60__ feet with _SPC__ device on level surfaces with _CGA__ level of assist, of three bouts   Rationale:           With   [] TE   [] TA   [] neuro   [] other: Patient Education: [x] Review HEP    [] Progressed/Changed HEP based on:   [] positioning   [] body mechanics   [] transfers   [] heat/ice application    [x] other: added cervical exercises now that patient no longer needs cervical collar: AROM cervical flexion, extension, and rotation 10 seconds X 10 repetitions and chin tucks 10 seconds X 10 repetitions with each one performed 3x/daily     Other Objective/Functional Measures:   Cervical AROM: extension 20, flexion 40, rotation right 60, rotation left 50, side-bending right 25, side-bending left 20 (in degrees for all planes)  Gross cervical isometrics 4/5, except extension 4-/5       Pain Level (0-10 scale) post treatment: 0/10    ASSESSMENT/Changes in Function:   Patient able to perform cervical exercises now that he no longer has collar on and no restrictions per patient and wife. PT will add cervical goals to POC to address deficits now that he is no longer restricted. Patient will continue to benefit from skilled PT services to modify and progress therapeutic interventions, address functional mobility deficits, address ROM deficits, address strength deficits and analyze and address soft tissue restrictions to attain remaining goals. []  See Plan of Care  [x]  See progress note/recertification  []  See Discharge Summary         Progress towards goals / Updated goals:  1. Increase cervical AROM to WNL of 80% so patient will be able to maneuver neck while dressing. Status @ Eval: Cervical AROM: extension 20, flexion 40, rotation right 60, rotation left 50, side-bending right 25, side-bending left 20 (in degrees for all planes)  2.  Increase cervical gross strength to 4/5 at least so patient will be able to sit for extended periods of timeand maintain proper posture  Status @ Eval: Gross cervical isometrics 4/5, except extension 4-/5     PLAN  []  Upgrade activities as tolerated     []  Continue plan of care  [x]  Update interventions per flow sheet       []  Discharge due to:_  [x]  Other:_  Updated POC    Glenn Lacy 5/5/2017  12:27 PM    Future Appointments  Date Time Provider Caprice Leal   5/5/2017 12:30 PM Vin[place] [facility]   5/10/2017 3:00 PM Vinod [facility]   5/12/2017 12:00 PM Vin[place] [facility]   5/16/2017 1:30 PM LIO Lina[place] [facility]   5/18/2017 9:30 AM LIO Lina[place] [facility]

## 2017-05-10 ENCOUNTER — HOSPITAL ENCOUNTER (OUTPATIENT)
Dept: PHYSICAL THERAPY | Age: 77
Discharge: HOME | End: 2017-05-10
Payer: MEDICARE

## 2017-05-10 PROCEDURE — 97116 GAIT TRAINING THERAPY: CPT

## 2017-05-10 PROCEDURE — 97110 THERAPEUTIC EXERCISES: CPT

## 2017-05-10 NOTE — PROGRESS NOTES
PT DAILY TREATMENT NOTE - [facility]     Patient Name: John Blue  Date:5/10/2017  : 1940  [x]  Patient  Verified  Payor: VA MEDICARE / Plan: VA MEDICARE PART A & B / Product Type: Medicare /    In time:02:56  Out time:03:38  Total Treatment Time (min): 42  Total Timed Codes (min): 42  1:1 Treatment Time ([facility] only): 42   Visit #: 8 of 17    Treatment Area: Neck pain [M54.2]    SUBJECTIVE  Pain Level (0-10 scale): 0/10  Any medication changes, allergies to medications, adverse drug reactions, diagnosis change, or new procedure performed?: [x] No    [] Yes (see summary sheet for update)  Subjective functional status/changes:   [x] No changes reported      OBJECTIVE    27 min Therapeutic Exercise:  [] See flow sheet :   Rationale: increase ROM, increase strength, improve coordination and improve balance to improve the patients ability to ambulate without AD    15 min Gait Training:  _100__ feet with _SPC__ device on level surfaces with _CGA/SBA__ level of assist   Rationale: With   [] TE   [] TA   [] neuro   [] other: Patient Education: [x] Review HEP    [] Progressed/Changed HEP based on:   [] positioning   [] body mechanics   [] transfers   [] heat/ice application    [] other:      Pain Level (0-10 scale) post treatment: 0/10    ASSESSMENT/Changes in Function:   Patient progressing with ambulation with increase right foot clearance and decreased LOB. Increase compliance with gait sequence and reduced vc's to correct. Patient will continue to benefit from skilled PT services to modify and progress therapeutic interventions, address functional mobility deficits, address ROM deficits, address strength deficits and analyze and address soft tissue restrictions to attain remaining goals.      []  See Plan of Care  []  See progress note/recertification  []  See Discharge Summary       PLAN  []  Upgrade activities as tolerated     [x]  Continue plan of care  []  Update interventions per flow sheet []  Discharge due to:_  []  Other:_      Lindsay Magallon 5/10/2017  4:06 PM    Future Appointments  Date Time Provider Caprice Leal   5/12/2017 12:00 PM Vinod [facility]   5/16/2017 1:30 PM LIO [place] [facility]   5/18/2017 9:30 AM LIO [place] [facility]

## 2017-05-12 ENCOUNTER — HOSPITAL ENCOUNTER (OUTPATIENT)
Dept: PHYSICAL THERAPY | Age: 77
Discharge: HOME | End: 2017-05-12
Payer: MEDICARE

## 2017-05-12 PROCEDURE — 97112 NEUROMUSCULAR REEDUCATION: CPT

## 2017-05-12 NOTE — PROGRESS NOTES
PT DAILY TREATMENT NOTE - [facility]     Patient Name: Sarah Russell  Date:2017  : 1940  [x]  Patient  Verified  Payor: [place] MEDICARE / Plan: [place] MEDICARE PART A & B / Product Type: Medicare /    In time:1204  Out time:1245  Total Treatment Time (min): 45  Total Timed Codes (min): 45  1:1 Treatment Time ([address] only): 39   Visit #: 9 of 17    Treatment Area: Neck pain [M54.2]    SUBJECTIVE  Pain Level (0-10 scale): 0/10  Any medication changes, allergies to medications, adverse drug reactions, diagnosis change, or new procedure performed?: [x] No    [] Yes (see summary sheet for update)  Subjective functional status/changes:   [] No changes reported  Pt reports his right knee has been cranky lately    OBJECTIVE         41 min Neuromuscular Re-education:  [x]  See flow sheet :normal gait pattern with SPC   Rationale: increase ROM, increase strength, improve coordination, improve balance and increase proprioception  to improve the patients ability to normal gait sequency with equal stride length and timing          With   [] TE   [] TA   [x] neuro   [] other: Patient Education: [x] Review HEP    [] Progressed/Changed HEP based on:   [] positioning   [] body mechanics   [] transfers   [] heat/ice application    [] other:      Other Objective/Functional Measures:       Pain Level (0-10 scale) post treatment:0/10    ASSESSMENT/Changes in Function:Pt demonstrates improving foot clearance on right foot following work on treadmill at .5 mph for 2 minutes. He was challenged with lateral work with [facility] however less with step ups on 2 inch step in parallel bars. Pt did require CGA for all SPC work and had one LOB during lateral stepping to right that required max A to avoid a fall.  Pt also required frequent cues to maintain more upright trunk posture    Patient will continue to benefit from skilled PT services to address functional mobility deficits, address ROM deficits, address strength deficits, analyze and address soft tissue restrictions, analyze and modify body mechanics/ergonomics, assess and modify postural abnormalities and instruct in home and community integration to attain remaining goals. []  See Plan of Care  []  See progress note/recertification  []  See Discharge Summary         Progress towards goals / Updated goals:  Patient Goal (s) has been updated and includes:   1. Increase cervical AROM to WNL of 80% so patient will be able to maneuver neck while dressing. Status @ Eval: Cervical AROM: extension 20, flexion 40, rotation right 60, rotation left 50, side-bending right 25, side-bending left 20 (in degrees for all planes)  2.  Increase cervical gross strength to 4/5 at least so patient will be able to sit for extended periods of timeand maintain proper posture  Status @ Eval: Gross cervical isometrics 4/5, except extension 4-/5    PLAN  []  Upgrade activities as tolerated     [x]  Continue plan of care  []  Update interventions per flow sheet       []  Discharge due to:_  []  Other:_      Jake Morris PTA 5/12/2017  11:50 AM    Future Appointments  Date Time Provider Caprice Leal   5/12/2017 12:00 PM Jake Morris PTA [facility]   5/16/2017 1:30 PM LIO Abdullahi [facility]   5/18/2017 9:30 AM LIO Abdullahi [facility]

## 2017-05-16 ENCOUNTER — HOSPITAL ENCOUNTER (OUTPATIENT)
Dept: PHYSICAL THERAPY | Age: 77
Discharge: HOME | End: 2017-05-16
Payer: MEDICARE

## 2017-05-16 PROCEDURE — 97110 THERAPEUTIC EXERCISES: CPT

## 2017-05-16 PROCEDURE — 97112 NEUROMUSCULAR REEDUCATION: CPT

## 2017-05-16 NOTE — PROGRESS NOTES
PT DAILY TREATMENT NOTE - [facility]     Patient Name: Louis aHll  Date:2017  : 1940  [x]  Patient  Verified  Payor: Moreno Allan / Plan: [place] / Product Type: Medicare /    In time:1330  Out time:1404  Total Treatment Time (min): 34  Total Timed Codes (min): 34  1:1 Treatment Time ( [address] only): 34   Visit #: 10 of 17    Treatment Area: Neck pain [M54.2]    SUBJECTIVE  Pain Level (0-10 scale): 0/10  Any medication changes, allergies to medications, adverse drug reactions, diagnosis change, or new procedure performed?: [x] No    [] Yes (see summary sheet for update)  Subjective functional status/changes:   [] No changes reported  I feel like my right leg is going to give out on me.     OBJECTIVE    Modality rationale:    Min Type Additional Details    [] Estim:  []Unatt       []IFC  []Premod                        []Other:  []w/ice   []w/heat  Position:  Location:    [] Estim: []Att    []TENS instruct  []NMES                    []Other:  []w/US   []w/ice   []w/heat  Position:  Location:    []  Traction: [] Cervical       []Lumbar                       [] Prone          []Supine                       []Intermittent   []Continuous Lbs:  [] before manual  [] after manual    []  Ultrasound: []Continuous   [] Pulsed                           []1MHz   []3MHz W/cm2:  Location:    []  Iontophoresis with dexamethasone         Location: [] Take home patch   [] In clinic    []  Ice     []  heat  []  Ice massage  []  Laser   []  Anodyne Position:  Location:    []  Laser with stim  []  Other:  Position:  Location:    []  Vasopneumatic Device Pressure:       [] lo [] med [] hi   Temperature: [] lo [] med [] hi   [] Skin assessment post-treatment:  []intact []redness- no adverse reaction    []redness  adverse reaction:      min []Eval                  []Re-Eval       24 min Therapeutic Exercise:  [x] See flow sheet :Reviewed cervical isometrics, CROM in quadruped   Rationale: increase ROM, increase strength, improve coordination, improve balance and increase proprioception to improve the patients ability to improve ADL's     min Therapeutic Activity:  []  See flow sheet :        10 min Neuromuscular Re-education:  [x]  See flow sheet :static and dynamic balance   Rationale: increase strength, improve coordination, improve balance and increase proprioception  to improve the patients ability to improve safety with gait and ADL's     min Manual Therapy:          min Gait Training:  ___ feet with ___ device on level surfaces with ___ level of assist   Rationale: With   [] TE   [] TA   [] neuro   [] other: Patient Education: [x] Review HEP Issued cervical isometrics for HEP  [] Progressed/Changed HEP based on:   [] positioning   [] body mechanics   [] transfers   [] heat/ice application    [] other:      Other Objective/Functional Measures: FOTO: 59/100 CROM in sitting: flexion: 72, ext: 40, right lateral flexion: 18, ;left lateral flexion: 26, right rot: 42, left rot: 48    Pain Level (0-10 scale) post treatment: 0/10 with right knee instability    ASSESSMENT/Changes in Function: No new changes with gait since last visit. Patient will continue to benefit from skilled PT services to modify and progress therapeutic interventions, address functional mobility deficits, address ROM deficits, address strength deficits, analyze and address soft tissue restrictions, analyze and cue movement patterns, analyze and modify body mechanics/ergonomics, assess and modify postural abnormalities, address imbalance/dizziness and instruct in home and community integration to attain remaining goals. []  See Plan of Care  [x]  See progress note/recertification  []  See Discharge Summary    Progress towards goals / Updated goals:  Patient Goal (s) has been updated and includes:   1. Increase cervical AROM to WNL of 80% so patient will be able to maneuver neck while dressing.   Status @ Eval: Cervical AROM: extension 20, flexion 40, rotation right 60, rotation left 50, side-bending right 25, side-bending left 20 (in degrees for all planes)  Current: CROM in sitting: flexion: 72, ext: 40, right lateral flexion: 18, ;left lateral flexion: 26, right rot: 42, left rot: 48    2.  Increase cervical gross strength to 4/5 at least so patient will be able to sit for extended periods of timeand maintain proper posture  Status @ Eval: Gross cervical isometrics 4/5, except extension 4-/5  Current: issued cervical isometrics today for HEP             PLAN  []  Upgrade activities as tolerated     [x]  Continue plan of care  []  Update interventions per flow sheet       []  Discharge due to:_  []  Other:_      Kiana Glasgow PTA 5/16/2017  1:26 PM    Future Appointments  Date Time Provider Caprice Leal   5/16/2017 1:30 PM LIO Holder [facility]   5/18/2017 9:30 AM LIO Holder [facility]

## 2017-05-18 ENCOUNTER — HOSPITAL ENCOUNTER (OUTPATIENT)
Dept: PHYSICAL THERAPY | Age: 77
Discharge: HOME | End: 2017-05-18
Payer: MEDICARE

## 2017-05-18 PROCEDURE — 97110 THERAPEUTIC EXERCISES: CPT

## 2017-05-18 NOTE — PROGRESS NOTES
PT DAILY TREATMENT NOTE - [facility]     Patient Name: Cynthia Bolivar  Date:2017  : 1940  [x]  Patient  Verified  Payor: Lily Grajeda / Plan: [place] MEDICARE PART A & B / Product Type: Medicare /    In PWQZ:4475  Out time:1014  Total Treatment Time (min): 39  Total Timed Codes (min): 39  1:1 Treatment Time ( [address] only): 30   Visit #: 38 of 17    Treatment Area: Neck pain [M54.2]    SUBJECTIVE  Pain Level (0-10 scale): 0/10  Any medication changes, allergies to medications, adverse drug reactions, diagnosis change, or new procedure performed?: [x] No    [] Yes (see summary sheet for update)  Subjective functional status/changes:   [] No changes reported  My right leg is really giving me problems. It woke me up early this morning and I could not fall back asleep.     OBJECTIVE    Modality rationale:    Min Type Additional Details    [] Estim:  []Unatt       []IFC  []Premod                        []Other:  []w/ice   []w/heat  Position:  Location:    [] Estim: []Att    []TENS instruct  []NMES                    []Other:  []w/US   []w/ice   []w/heat  Position:  Location:    []  Traction: [] Cervical       []Lumbar                       [] Prone          []Supine                       []Intermittent   []Continuous Lbs:  [] before manual  [] after manual    []  Ultrasound: []Continuous   [] Pulsed                           []1MHz   []3MHz W/cm2:  Location:    []  Iontophoresis with dexamethasone         Location: [] Take home patch   [] In clinic    []  Ice     []  heat  []  Ice massage  []  Laser   []  Anodyne Position:  Location:    []  Laser with stim  []  Other:  Position:  Location:    []  Vasopneumatic Device Pressure:       [] lo [] med [] hi   Temperature: [] lo [] med [] hi   [] Skin assessment post-treatment:  []intact []redness- no adverse reaction    []redness  adverse reaction:      min []Eval                  []Re-Eval       39 min Therapeutic Exercise:  [x] See flow sheet :reviewed cervical isometrics   Rationale: increase ROM, increase strength, improve coordination, improve balance and increase proprioception to improve the patients ability to improve ADL's     min Therapeutic Activity:  []  See flow sheet :         min Neuromuscular Re-education:  []  See flow sheet :        min Manual Therapy:          min Gait Training:  ___ feet with ___ device on level surfaces with ___ level of assist   Rationale: With   [] TE   [] TA   [] neuro   [] other: Patient Education: [x] Review HEP    [] Progressed/Changed HEP based on:   [] positioning   [] body mechanics   [] transfers   [] heat/ice application    [] other:      Other Objective/Functional Measures: Reviewed neck isometrics for HEP     Pain Level (0-10 scale) post treatment: 0/10    ASSESSMENT/Changes in Function: Pt reporting discomfort when loading right LE in gait and static stance with plans to follow up with MD regarding sx. Patient will continue to benefit from skilled PT services to modify and progress therapeutic interventions, address functional mobility deficits, address ROM deficits, address strength deficits, analyze and address soft tissue restrictions, analyze and cue movement patterns, analyze and modify body mechanics/ergonomics, assess and modify postural abnormalities, address imbalance/dizziness and instruct in home and community integration to attain remaining goals. []  See Plan of Care  [x]  See progress note/recertification  []  See Discharge Summary             PLAN  []  Upgrade activities as tolerated     [x]  Continue plan of care  []  Update interventions per flow sheet       []  Discharge due to:_  []  Other:_      Ascencion Beasley, LIO 5/18/2017  9:55 AM    No future appointments.

## 2017-05-22 ENCOUNTER — HOSPITAL ENCOUNTER (OUTPATIENT)
Dept: PHYSICAL THERAPY | Age: 77
Discharge: HOME | End: 2017-05-22
Payer: MEDICARE

## 2017-05-22 PROCEDURE — 97110 THERAPEUTIC EXERCISES: CPT

## 2017-05-22 PROCEDURE — 97112 NEUROMUSCULAR REEDUCATION: CPT

## 2017-05-22 NOTE — PROGRESS NOTES
PT DAILY TREATMENT NOTE - [facility]     Patient Name: Cornelius Herbert  Date:2017  : 1940  [x]  Patient  Verified  Payor: Phillip Rene / Plan: [place] MEDICARE PART A & B / Product Type: Medicare /    In time:825  Out time:910  Total Treatment Time (min): 45  Total Timed Codes (min): 45  1:1 Treatment Time ([address] only): 39   Visit #: 12 of 17    Treatment Area: Neck pain [M54.2]    SUBJECTIVE  Pain Level (0-10 scale): 0/10 at rest, 4/10 with WB activity right knee  Any medication changes, allergies to medications, adverse drug reactions, diagnosis change, or new procedure performed?: [x] No    [] Yes (see summary sheet for update)  Subjective functional status/changes:   [] No changes reported  My knee hurts when I stand and walk on it.     OBJECTIVE    Modality rationale:    Min Type Additional Details    [] Estim:  []Unatt       []IFC  []Premod                        []Other:  []w/ice   []w/heat  Position:  Location:    [] Estim: []Att    []TENS instruct  []NMES                    []Other:  []w/US   []w/ice   []w/heat  Position:  Location:    []  Traction: [] Cervical       []Lumbar                       [] Prone          []Supine                       []Intermittent   []Continuous Lbs:  [] before manual  [] after manual    []  Ultrasound: []Continuous   [] Pulsed                           []1MHz   []3MHz W/cm2:  Location:    []  Iontophoresis with dexamethasone         Location: [] Take home patch   [] In clinic    []  Ice     []  heat  []  Ice massage  []  Laser   []  Anodyne Position:  Location:    []  Laser with stim  []  Other:  Position:  Location:    []  Vasopneumatic Device Pressure:       [] lo [] med [] hi   Temperature: [] lo [] med [] hi   [] Skin assessment post-treatment:  []intact []redness- no adverse reaction    []redness  adverse reaction:      min []Eval                  []Re-Eval       35 min Therapeutic Exercise:  [x] See flow sheet :   Rationale: increase ROM and increase strength to improve the patients ability to normalize ADL's     min Therapeutic Activity:  []  See flow sheet :        10 min Neuromuscular Re-education:  []  See flow sheet :   Rationale: improve coordination, improve balance and increase proprioception  to improve the patients ability to normalize gait and function     min Manual Therapy:          min Gait Training:  ___ feet with ___ device on level surfaces with ___ level of assist   Rationale: With   [] TE   [] TA   [] neuro   [] other: Patient Education: [x] Review HEP    [] Progressed/Changed HEP based on:   [] positioning   [] body mechanics   [] transfers   [] heat/ice application    [] other:      Other Objective/Functional Measures:   Sit to stand: 6x took 33 seconds (5 in 30 seconds)  TUG score: 26 seconds with RW     Pain Level (0-10 scale) post treatment: 0/10 at rest, 5/10 with WB activity right knee    ASSESSMENT/Changes in Function: Pt continues to report right knee pain limiting progress and function. He has called MD to schedule appointment but has not heard back from office to date. Pt had one episode of right knee buckling when stepping over hurdles in parallel bars: able to maintain balance with bilateral UE support. No additional improvement in sit to stand ability. TUG score time continues to improve to progress towards goal.    Patient will continue to benefit from skilled PT services to modify and progress therapeutic interventions, address functional mobility deficits, address ROM deficits, address strength deficits, analyze and address soft tissue restrictions, analyze and cue movement patterns, analyze and modify body mechanics/ergonomics, assess and modify postural abnormalities and address imbalance/dizziness to attain remaining goals. []  See Plan of Care  []  See progress note/recertification  []  See Discharge Summary         Progress towards goals / Updated goals:   Short Term Goals: To be accomplished in 4 weeks:  1.  Patient will demonstrate compliance with HEP to be able to continue with maintenance program following discharge  Status @ Eval: to be provided next visit  Status at last note:Issued HEP today  Current: pt report compliance  2. Patient will have decrease TUG score to 20 seconds indicating decrease risk for falls. Status @ Eval: TUG 35.33 seconds with rolling walker  Status at last note:TUG 30.0 seconds with rolling walker  Current: 26 seconds with RW  3. Patient will have increased Rhomberg Balance with EO Tandem to 15 seconds indicating improvement in balance decreasing risk for falls. Status @ Eval: Rhomberg EO Tandem 4 seconds  Status at last note:no change  Current:Rhomberg EO Tandem 10 seconds  4. Patient will be able to perform sit to stands 6X within 30 seconds to be able to transition quickly from sit to standing while ambulating in household  Status @ Eval: 4x  Status at last note:5 times in 30 sec  Current: 6x in 33 seconds (5x in 30 seconds)      Long Term Goals: To be accomplished in 8 weeks:  1. Patient will be independent with HEP at time of discharge to be able to continue with maintenance program following discharge. Status @ Eval: to be provided next visit  Status at last note: HEP issued today  Current: pt reports compliance  2. Patient will have decrease TUG score to =<12 seconds indicating low risk for falls. Status @ Eval: TUG 35.33 seconds with rolling walker  Status at last note: 30 sec  Current: 26 sec with RW  3. Patient will have increased Rhomberg Balance with EO Tandem to 30 seconds indicating improvement in balance systems not requiring heavy dependability on vision decreasing risk for falls  Status @ Eval: Rhomberg EO Tandem 4 seconds  Status at last note:no change  Current: Rhomberg EO Tandem 10 seconds  4.  Patient will have increased change score by 14 points on FOTO FS Primary Measure to 52 indicating improvement in overall mobility   Status @ Eval: 38  Status at last note: 44/100  Current: met: 59/100     Patient Goal (s) has been updated and includes:   1. Increase cervical AROM to WNL of 80% so patient will be able to maneuver neck while dressing. Status @ Eval: Cervical AROM: extension 20, flexion 40, rotation right 60, rotation left 50, side-bending right 25, side-bending left 20 (in degrees for all planes)  Current: CROM in sitting: flexion: 72, ext: 40, right lateral flexion: 18, ;left lateral flexion: 26, right rot: 42, left rot: 48     2.  Increase cervical gross strength to 4/5 at least so patient will be able to sit for extended periods of timeand maintain proper posture  Status @ Eval: Gross cervical isometrics 4/5, except extension 4-/5  Current: issued cervical isometrics today for HEP      PLAN  [x]  Upgrade activities as tolerated     [x]  Continue plan of care  []  Update interventions per flow sheet       []  Discharge due to:_  []  Other:_      Kyara Hanks PT 5/22/2017  8:22 AM    Future Appointments  Date Time Provider Caprice Leal   5/22/2017 8:30 AM AMADOR Sood [facility]   5/24/2017 8:30 AM AMADOR Sood [facility]   6/5/2017 11:30 AM AMADOR Grullon [facility]   6/7/2017 9:30 AM AMADOR Grullon [facility]

## 2017-05-24 ENCOUNTER — HOSPITAL ENCOUNTER (OUTPATIENT)
Dept: PHYSICAL THERAPY | Age: 77
End: 2017-05-24
Payer: MEDICARE

## 2017-06-05 ENCOUNTER — HOSPITAL ENCOUNTER (OUTPATIENT)
Dept: PHYSICAL THERAPY | Age: 77
Discharge: HOME | End: 2017-06-05
Payer: MEDICARE

## 2017-06-05 PROCEDURE — G8979 MOBILITY GOAL STATUS: HCPCS

## 2017-06-05 PROCEDURE — 97530 THERAPEUTIC ACTIVITIES: CPT

## 2017-06-05 PROCEDURE — 97110 THERAPEUTIC EXERCISES: CPT

## 2017-06-05 PROCEDURE — G8980 MOBILITY D/C STATUS: HCPCS
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
## 2017-06-07 ENCOUNTER — APPOINTMENT (OUTPATIENT)
Dept: PHYSICAL THERAPY | Age: 77
End: 2017-06-07
Payer: MEDICARE

## 2017-08-18 ENCOUNTER — APPOINTMENT (OUTPATIENT)
Dept: PHYSICAL THERAPY | Age: 77
End: 2017-08-18

## 2017-08-21 ENCOUNTER — HOSPITAL ENCOUNTER (OUTPATIENT)
Dept: PHYSICAL THERAPY | Age: 77
Discharge: HOME | End: 2017-08-21
Payer: MEDICARE

## 2017-08-21 PROCEDURE — G8979 MOBILITY GOAL STATUS: HCPCS

## 2017-08-21 PROCEDURE — 97162 PT EVAL MOD COMPLEX 30 MIN: CPT

## 2017-08-21 PROCEDURE — G8978 MOBILITY CURRENT STATUS: HCPCS

## 2017-08-21 PROCEDURE — 97530 THERAPEUTIC ACTIVITIES: CPT

## 2017-08-21 NOTE — PROGRESS NOTES
[facility] at [address] [place] [address]  Phone: [phone]   Fax: [phone]    Plan of Care/ Statement of Necessity for Physical Therapy Services    Patient name: Alexsandra Holder Start of Care: 2017   Referral source: Carlos Pineda MD : 1940    Medical Diagnosis: Right knee pain [M25.561]   Onset Date:17    Treatment Diagnosis: s/p right TKR   Prior Hospitalization: see medical history Provider#: [id number]   Medications: Verified on Patient summary List    Comorbidities: HTN, mini-stroke history   Prior Level of Function: standing for ADLs and showers, RW to ambulate prior to TKR (since 2017 due to neck surgery), pain with everyday activities      The Plan of Care and following information is based on the information from the initial evaluation. Assessment/ key information: Pt is a 78 y.o. male presenting to therapy following right TKR on 17. Pt went to rehab facility following (17-08/15/17) surgery due to limitations in functional mobility. Pt remains with RW for ambulation, lacking knee extension in stance, dragging right toe due to limited hip/knee/ankle flexion. Pt impairments include severely limited knee extension, limitations with patellar mobility and knee flexion AROM/PROM, quad strength, and right LE strength. Pt functional limitations include pain/difficulty with walking, getting into and out of home, sitting to shower, and standing for ADLs. Pt would benefit from skilled PT to improve impairments listed above and return pt to prior level of function of improved gait and ADL tolerance.      Evaluation Complexity History MEDIUM  Complexity : 1-2 comorbidities / personal factors will impact the outcome/ POC ; Examination HIGH Complexity : 4+ Standardized tests and measures addressing body structure, function, activity limitation and / or participation in recreation  ;Presentation MEDIUM Complexity : Evolving with changing characteristics  ; Clinical Decision Making MEDIUM Complexity : FOTO score of 26-74  Overall Complexity Rating: MEDIUM  Problem List: pain affecting function, decrease ROM, decrease strength, impaired gait/ balance, decrease ADL/ functional abilitiies, decrease activity tolerance, decrease flexibility/ joint mobility and decrease transfer abilities   Treatment Plan may include any combination of the following: Therapeutic exercise, Therapeutic activities, Neuromuscular re-education, Physical agent/modality, Gait/balance training, Manual therapy, Aquatic therapy, Patient education, Self Care training, Functional mobility training, Home safety training and Stair training  Patient / Family readiness to learn indicated by: asking questions, trying to perform skills and interest  Persons(s) to be included in education: patient (P)  Barriers to Learning/Limitations: None  Patient Goal (s): getting rid of this thing (points to RW).   Patient Self Reported Health Status: good  Rehabilitation Potential: fair-good    Short Term Goals: STG- To be accomplished in 4 week(s):  1. Pt will be independent with HEP to encourage prophylaxis. Eval:dispesned  Current: NA     2. Pt PROM extension will improve to lacking <10 deg to allow pt to ambulate with more normalized gait pattern and least restrictive AD. Eval:lacking 21, RW and lacking ext in stance  Current: NA     Long Term Goals: LTG- To be accomplished in 8 week(s):  1. Pt will improve quad contraction to strong to allow pt to ambulate with normalized gait pattern for improved community navigation. Eval:poor  Current: NA     2.  Pt will be able to stand for ADLs and showers to improve functional mobility. Eval:unable to stand for showers or ADLs  Current: NA     3. Pt AROM will improve to lacking <10 to 115 knee flexion to allow pt to go up and down stairs in reciprocal fashion for improved home access. Eval:lacking 24-99  Current: NA     4.   Pt FOTO score will increase by >20 points to show improvement in right knee function. Eval:30  Current: will address at visit 5  Frequency / Duration: Patient to be seen 3 times per week for 4 weeks. Patient/ Caregiver education and instruction: Diagnosis, prognosis, self care, activity modification, brace/ splint application and exercises   [x]  Plan of care has been reviewed with LIO    G-Codes (GP)  Mobility   Current  CL= 60-79%   Goal  CK= 40-59%      The severity rating is based on clinical judgment and the FOTO score. Certification Period: 8/21/17-11/17/17  Jonah Moss, PT 8/21/2017 10:57 AM  _____________________________________________________________________  I certify that the above Therapy Services are being furnished while the patient is under my care. I agree with the treatment plan and certify that this therapy is necessary.     Physician's Signature:____________________  Date:__________Time:______    Please sign and return to   [facility] at [address] [place] [address]  Phone: [phone]   Fax: [phone]

## 2017-08-21 NOTE — PROGRESS NOTES
PT DAILY TREATMENT NOTE/KNEE EVAL 3-16    Patient Name: Yo Sorto  Date:2017  : 1940  [x]  Patient  Verified  Payor: Jaswinder Font / Plan: [place] & B / Product Type: Medicare /    In time:1010  Out time:1051  Total Treatment Time (min): 41  Total Timed Codes (min): 11  1:1 Treatment Time (MC only): 41   Visit #: 1 of 20    Treatment Area: Right knee pain [M25.561]    SUBJECTIVE  Pain Level (0-10 scale): 0  []constant []intermittent []improving []worsening []no change since onset    Any medication changes, allergies to medications, adverse drug reactions, diagnosis change, or new procedure performed?: [x] No    [] Yes (see summary sheet for update)  Subjective functional status/changes:       Pain:  _4__/10 max       __0_/10 min     __0__/10 now    Location: right knee  [facility] [ ] Sharp    [ ] Thersia Amen [ ] Elmira Donath [X]  Aching     [ ] Shirley.Romy [ ] Alex Sailaja [ ] Other:        [ Yecenia Gash [X] Intermittent        Pain at night- denies     Pt denies any falls since surgery. Pt had several falls prior to surgery, when walking without AD and LE would give out. Social/Recreation       Hobbies-   denies               Aggravating factors- walking, getting into and out of home, sitting to shower, standing for ADLs  Relieving factors- getting off of LE    PLOF: standing for ADLs and showers, RW to ambulate prior to TKR (since 2017 due to neck surgery), pain with everyday activities  Limitations to PLOF: pain, limited ROM     Mechanism of Injury: Pt had TKR on right knee on 17. Pt went to [facility] rehab from 17-08/15/17. Pt had to go to rehab to improve functional mobility before going home. Pt is now home. Pt is ambulating with RW. Pt has dynasplint at home to encourage knee extension, received last week but wife reports limited use and HEP. Pt saw MD 17, returns 17.     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 Rationale:           With   [] TE   [] TA   [] neuro   [] other: Patient Education: [x] Review HEP    [] Progressed/Changed HEP based on:   [] positioning   [] body mechanics   [] transfers   [] heat/ice application    [] other:      Physical Therapy Evaluation - Knee    Posture: [] Varus    [] Valgus    [] Recurvatum        [] Tibial Torsion    [] Foot Supination    [] Foot Pronation    Describe:    Gait:  [] Normal    [x] Abnormal    [] Antalgic    [] NWB    Device: RW    Describe: limited knee, hip flexion, DF during swing; drags right toe on ground, no knee extension with stance- toe touch walking     ROM / Strength  [] Unable to assess                  AROM                      PROM                   Strength (1-5)    Left Right Left Right Left Right   Hip Flexion     4+ 4    Extension          Abduction          Adduction         Knee Flexion 107 99  110 4+ 3+    Extension Lacking 9 Lacking 24  Lacking 20 4+ 4-   Ankle Plantarflexion     4+ 4-    Dorsiflexion     4+ 4       Flexibility: [] Unable to assess at this time  Hamstrings:    (L) Tightness= [] WNL   [] Min   [x] Mod   [] Severe  45 deg extension from 90-90   (R) Tightness= [] WNL   [] Min   [] Mod   [x] Severe 30 deg extension from 90-90  Quadriceps:    (L) Tightness= [] WNL   [] Min   [] Mod   [] Severe    (R) Tightness= [] WNL   [] Min   [] Mod   [] Severe  Gastroc:      (L) Tightness= [] WNL   [] Min   [] Mod   [] Severe    (R) Tightness= [] WNL   [] Min   [] Mod   [] Severe  Other:    Palpation:   Neg/Pos  Neg/Pos  Neg/Pos   Joint Line  Quad tendon  Patellar ligament    Patella  Fibular head  Pes Anserinus    Tibial tubercle  Hamstring tendons  Infrapatellar fat pad      Optional Tests:  Patellar Positioning (Static)   []L []R Normal []L []R Lateral   []L []R Agosto Medal      []L []R Medial   []L []R Baja    Patellar Tracking   []L []R Glide (Lat)   []L []R Tilt (Lat)     []L []R Glide (Med)  []L []R Tilt (Med)      []L []R Tile (Inf)     Patellar Mobility   []L []R Hypermobile []L []R Hypomobile         Girth Measurements:     Cm at  Cm above joint line   Cm at   Cm below joint line  Cm at joint line   Left        Right           Lachmans  [] Neg    [] Pos Posterior Drawer [] Neg    [] Pos  Pivot Shift  [] Neg    [] Pos Posterior Sag  [] Neg    [] Pos  MARCELLA   [] Neg    [] Pos Cristopher's Test [] Neg    [] Pos  ALRI   [] Neg    [] Pos Squat   [] Neg    [] Pos  Valgus@ 0 Degrees [] Neg    [] Pos Annamaria-Cholo [] Neg    [] Pos  Valgus@ 30 Degrees [] Neg    [] Pos Patellar Apprehension [] Neg    [] Pos  Varus@ 0 Degrees [] Neg    [] Pos Alcantara's Compression [] Neg    [] Pos  Varus@ 30 Degrees [] Neg    [] Pos Ely's Test  [] Neg    [] Pos  Apley's Compression [] Neg    [] Pos Ty's Test  [] Neg    [] Pos  Apley's Distraction [] Neg    [] Pos Stroke Test  [] Neg    [] Pos   Anterior Drawer [] Neg    [] Pos Fluctuation Test [] Neg    [] Pos  Other:                  [] Neg    [] Pos                 Other tests/comments: poor quad set on right LE and poor patellar mobility on right LE  Good for both on left LE        Pain Level (0-10 scale) post treatment: 0    ASSESSMENT/Changes in Function: Pt is a 78 y.o. male presenting to therapy following right TKR on 06/28/17. Pt went to rehab facility following (07/01/17-08/15/17) surgery due to limitations in functional mobility. Pt remains with RW for ambulation, lacking knee extension in stance, dragging right toe due to limited hip/knee/ankle flexion. Pt impairments include severely limited knee extension, limitations with patellar mobility, quad strength, right LE strength. Pt functional limitations include pain/difficulty with walking, getting into and out of home, sitting to shower, and standing for ADLs. Pt would benefit from skilled PT to improve impairments listed above and return pt to prior level of function of improved gait and ADL tolerance. Patient will continue to benefit from skilled PT services to modify and progress therapeutic interventions, address functional mobility deficits, address ROM deficits, address strength deficits, analyze and address soft tissue restrictions, analyze and cue movement patterns, analyze and modify body mechanics/ergonomics, assess and modify postural abnormalities and instruct in home and community integration to attain remaining goals. [x]  See Plan of Care  []  See progress note/recertification  []  See Discharge Summary         Progress towards goals / Updated goals:  Short Term Goals: STG- To be accomplished in 4 week(s):  1. Pt will be independent with HEP to encourage prophylaxis. Eval:dispesned  Current: NA    2. Pt PROM extension will improve to lacking <10 deg to allow pt to ambulate with more normalized gait pattern and least restrictive AD. Eval:lacking 20  Current: NA    Long Term Goals: LTG- To be accomplished in 8 week(s):  1. Pt will improve quad contraction to strong to allow pt to ambulate with normalized gait pattern for improved community navigation. Eval:poor  Current: NA    2.   Pt will be able to stand for ADLs and showers to improve functional mobility. Eval:unable to stand for showers or ADLs  Current: NA    3. Pt AROM will improve to lacking <10 to 115 knee flexion to allow pt to go up and down stairs in reciprocal fashion for improved home access. Eval:lacking 24-99  Current: NA    4. Pt FOTO score will increase by >20 points to show improvement in right knee function.   Eval:30  Current: will address at visit 5    PLAN  []  Upgrade activities as tolerated     [x]  Continue plan of care  []  Update interventions per flow sheet       []  Discharge due to:_  []  Other:_      Nelly Forte PT 8/21/2017  10:13 AM

## 2017-08-22 ENCOUNTER — HOSPITAL ENCOUNTER (OUTPATIENT)
Dept: PHYSICAL THERAPY | Age: 77
Discharge: HOME | End: 2017-08-22
Payer: MEDICARE

## 2017-08-22 PROCEDURE — 97112 NEUROMUSCULAR REEDUCATION: CPT

## 2017-08-22 PROCEDURE — 97110 THERAPEUTIC EXERCISES: CPT

## 2017-08-22 PROCEDURE — 97016 VASOPNEUMATIC DEVICE THERAPY: CPT

## 2017-08-22 NOTE — PROGRESS NOTES
PT DAILY TREATMENT NOTE - [facility]     Patient Name: Melquiades Conklin  Date:2017  : 1940  [x]  Patient  Verified  Payor: Chely Brush / Plan: [place] MEDICARE PART A & B / Product Type: Medicare /    In time:1100  Out time:1152  Total Treatment Time (min): 52  Total Timed Codes (min): 42  1:1 Treatment Time ( only): 45   Visit #: 2 of 20    Treatment Area: Right knee pain [M25.561]    SUBJECTIVE  Pain Level (0-10 scale): 0/10  Any medication changes, allergies to medications, adverse drug reactions, diagnosis change, or new procedure performed?: [x] No    [] Yes (see summary sheet for update)  Subjective functional status/changes:   [] No changes reported  No pain at all. Reviewed eval prior to tx.     OBJECTIVE    Modality rationale: Decrease edema and inflammation to improve the patients ability to improve gait and ADL's   Min Type Additional Details    [] Estim:  []Unatt       []IFC  []Premod                        []Other:  []w/ice   []w/heat  Position:  Location:    [] Estim: []Att    []TENS instruct  []NMES                    []Other:  []w/US   []w/ice   []w/heat  Position:  Location:    []  Traction: [] Cervical       []Lumbar                       [] Prone          []Supine                       []Intermittent   []Continuous Lbs:  [] before manual  [] after manual    []  Ultrasound: []Continuous   [] Pulsed                           []1MHz   []3MHz W/cm2:  Location:    []  Iontophoresis with dexamethasone         Location: [] Take home patch   [] In clinic    []  Ice     []  heat  []  Ice massage  []  Laser   []  Anodyne Position:  Location:    []  Laser with stim  []  Other:  Position:  Location:   10 [x]  Vasopneumatic Device right knee Pressure:       [] lo [x] med [] hi   Temperature: [x] lo [] med [] hi   [] Skin assessment post-treatment:  []intact []redness- no adverse reaction    []redness  adverse reaction:      min []Eval                  []Re-Eval       30 min Therapeutic Exercise: [x] See flow sheet :quad sets with manual overpressure, manually facilitated HS stretch   Rationale: increase ROM and increase strength to improve the patients ability to improve gait and ADL's     min Therapeutic Activity:  []  See flow sheet :        10 min Neuromuscular Re-education:  [x]  See flow sheet :dynamic balance, contract relax to promote HS length on left   Rationale: increase ROM, increase strength, improve coordination, improve balance and increase proprioception  to improve the patients ability to improve gait and balance     min Manual Therapy:          min Gait Training:  ___ feet with ___ device on level surfaces with ___ level of assist   Rationale: With   [] TE   [] TA   [] neuro   [] other: Patient Education: [x] Review HEP    [] Progressed/Changed HEP based on:   [] positioning   [] body mechanics   [] transfers   [] heat/ice application    [] other:      Other Objective/Functional Measures:      Pain Level (0-10 scale) post treatment: 0/10    ASSESSMENT/Changes in Function: Pt ambulating using RW with limited clearance on right foot in swing phase. Patient will continue to benefit from skilled PT services to modify and progress therapeutic interventions, address ROM deficits, address strength deficits, analyze and address soft tissue restrictions, analyze and cue movement patterns, analyze and modify body mechanics/ergonomics, assess and modify postural abnormalities, address imbalance/dizziness and instruct in home and community integration to attain remaining goals. [x]  See Plan of Care. Pt wearing the dynasplint 1 1/2 hours now. []  See progress note/recertification  []  See Discharge Summary         Progress towards goals / Updated goals:  Short Term Goals: STG- To be accomplished in 4 week(s):  1.  Pt will be independent with HEP to encourage prophylaxis.   Eval:dispensed  Current:reviewed      2.  Pt PROM extension will improve to lacking <10 deg to allow pt to ambulate with more normalized gait pattern and least restrictive AD. Eval:lacking 20, RW and lacking ext in stance  Current: new therex progressed to promote TKE      Long Term Goals: LTG- To be accomplished in 8 week(s):  1.  Pt will improve quad contraction to strong to allow pt to ambulate with normalized gait pattern for improved community navigation. Eval:poor  Current: no change      2.  Pt will be able to stand for ADLs and showers to improve functional mobility. Eval:unable to stand for showers or ADLs  Current: no change      3.  Pt AROM will improve to lacking <10 to 115 knee flexion to allow pt to go up and down stairs in reciprocal fashion for improved home access. Eval:lacking 24-99  Current: no change      4.  Pt FOTO score will increase by >20 points to show improvement in right knee function.   Eval:30  Current: will address at visit 5    PLAN  []  Upgrade activities as tolerated     [x]  Continue plan of care  []  Update interventions per flow sheet       []  Discharge due to:_  []  Other:_      Padmaja Looney PTA 8/22/2017  11:24 AM    Future Appointments  Date Time Provider Caprice Leal   8/24/2017 9:00 AM Padmaja Looney PTA [facility]   [facility]2017 [facility]   8/31/2017 10:00 AM Padmaja Looney PTA [facility]   9/1/2017 8:30 AM Padmaja Aayush, PTA [facility]   9/6/2017 4:00 PM Padmaja Aayush, PTA [facility]   9/7/2017 10:30 AM Padmaja Aayush, PTA [facility]   9/8/2017 3:00 PM Padmaja Aayush, PTA [facility]   9/12/2017 9:30 AM Padmaja Aayush, PTA [facility]   9/14/2017 10:30 AM Padmaja Orrbriana, PTA [facility]   9/15/2017 3:00 PM Padmaja Aayush, PTA [facility]

## 2017-08-24 ENCOUNTER — HOSPITAL ENCOUNTER (OUTPATIENT)
Dept: PHYSICAL THERAPY | Age: 77
Discharge: HOME | End: 2017-08-24
Payer: MEDICARE

## 2017-08-24 PROCEDURE — 97016 VASOPNEUMATIC DEVICE THERAPY: CPT

## 2017-08-24 PROCEDURE — 97140 MANUAL THERAPY 1/> REGIONS: CPT

## 2017-08-24 PROCEDURE — 97110 THERAPEUTIC EXERCISES: CPT

## 2017-08-24 PROCEDURE — 97112 NEUROMUSCULAR REEDUCATION: CPT

## 2017-08-24 NOTE — PROGRESS NOTES
PT DAILY TREATMENT NOTE - [facility]     Patient Name: Florian Crain  Date:2017  : 1940  [x]  Patient  Verified  Payor: Meaghan Nunn / Plan: [place] MED[place] & B / Product Type: Medicare /    In time:0900  Out time:1000  Total Treatment Time (min): 60  Total Timed Codes (min): 50  1:1 Treatment Time ( [address] only): 45   Visit #: 3 of 20    Treatment Area: Right knee pain [M25.561]    SUBJECTIVE  Pain Level (0-10 scale): 0/10  Any medication changes, allergies to medications, adverse drug reactions, diagnosis change, or new procedure performed?: [x] No    [] Yes (see summary sheet for update)  Subjective functional status/changes:   [] No changes reported  I want to get off this walker. I used the splint for an hour two times yesterday.     OBJECTIVE    Modality rationale: decrease inflammation and decrease pain to improve the patients ability to improve gait and ADL's   Min Type Additional Details    [] Estim:  []Unatt       []IFC  []Premod                        []Other:  []w/ice   []w/heat  Position:  Location:    [] Estim: []Att    []TENS instruct  []NMES                    []Other:  []w/US   []w/ice   []w/heat  Position:  Location:    []  Traction: [] Cervical       []Lumbar                       [] Prone          []Supine                       []Intermittent   []Continuous Lbs:  [] before manual  [] after manual    []  Ultrasound: []Continuous   [] Pulsed                           []1MHz   []3MHz W/cm2:  Location:    []  Iontophoresis with dexamethasone         Location: [] Take home patch   [] In clinic    []  Ice     []  heat  []  Ice massage  []  Laser   []  Anodyne Position:  Location:    []  Laser with stim  []  Other:  Position:  Location:   10 [x]  Vasopneumatic Device right knee Pressure:       [] lo [x] med [] hi   Temperature: [x] lo [] med [] hi   [x] Skin assessment post-treatment:  [x]intact [x]redness- no adverse reaction    []redness  adverse reaction:      min []Eval []Re-Eval       30 min Therapeutic Exercise:  [x] See flow sheet :   Rationale: increase ROM and increase strength to improve the patients ability to improve gait and ADL's     min Therapeutic Activity:  []  See flow sheet :        8 min Neuromuscular Re-education:  [x]  See flow sheet :dynamic balance   Rationale: increase strength, improve coordination, improve balance and increase proprioception  to improve the patients ability to improve gait and balance    12 min Manual Therapy:  AP mobs to promote end range extension, manually facilitated HS stretch, DTM right ITB   Rationale: decrease pain, increase ROM and increase tissue extensibility to improve kinematics of gait     min Gait Training:  ___ feet with ___ device on level surfaces with ___ level of assist   Rationale: With   [] TE   [] TA   [] neuro   [] other: Patient Education: [x] Review HEP    [] Progressed/Changed HEP based on:   [] positioning   [] body mechanics   [] transfers   [] heat/ice application    [] other:      Other Objective/Functional Measures:      Pain Level (0-10 scale) post treatment: 0/10    ASSESSMENT/Changes in Function: Pt continues to be significantly limited with end range extension on right LE with tight HS and ITB addressed manually and with dynasplint at home. Patient will continue to benefit from skilled PT services to modify and progress therapeutic interventions, address functional mobility deficits, address ROM deficits, address strength deficits, analyze and address soft tissue restrictions, analyze and cue movement patterns, analyze and modify body mechanics/ergonomics, assess and modify postural abnormalities, address imbalance/dizziness and instruct in home and community integration to attain remaining goals.      [x]  See Plan of Care  []  See progress note/recertification  []  See Discharge Summary         Progress towards goals / Updated goals:  Short Term Goals: STG- To be accomplished in 4 week(s):  1.  Pt will be independent with HEP to encourage prophylaxis. Eval:dispensed  Current:reviewed      2.  Pt PROM extension will improve to lacking <10 deg to allow pt to ambulate with more normalized gait pattern and least restrictive AD. Eval:lacking 20, RW and lacking ext in stance  Current: new therex progressed to promote TKE      Long Term Goals: LTG- To be accomplished in 8 week(s):  1.  Pt will improve quad contraction to strong to allow pt to ambulate with normalized gait pattern for improved community navigation. Eval:poor  Current: progressing with quad sets and TKE in standing      2.  Pt will be able to stand for ADLs and showers to improve functional mobility. Eval:unable to stand for showers or ADLs  Current: no change      3.  Pt AROM will improve to lacking <10 to 115 knee flexion to allow pt to go up and down stairs in reciprocal fashion for improved home access. Eval:lacking 24-99  Current: no change      4.  Pt FOTO score will increase by >20 points to show improvement in right knee function.   Eval:30  Current: will address at visit 5    PLAN  []  Upgrade activities as tolerated     []  Continue plan of care  []  Update interventions per flow sheet       []  Discharge due to:_  []  Other:_      Dorina Wilkins PTA 8/24/2017  9:08 AM    Future Appointments  Date Time Provider Caprice Leal   8/30/2017 10:00 AM Vinod [facility]   8/31/2017 10:00 AM LIO Jennings [facility]   9/1/2017 8:30 AM LIO JenningsHPTDEONDRE [facility]   9/6/2017 4:00 PM LIO JenningsHPTDEONDRE [facility]   9/7/2017 10:30 AM [facility]   9/8/2017 3:00 PM LIO [facility]   9/12/2017 9:30 AM LIO JenningsHPTDEONDRE [facility]   9/14/2017 10:30 AM LIO JenningsHPTDEONDRE [facility]   9/15/2017 3:00 PM Dorina Wilkins PTA [facility]

## 2017-08-28 ENCOUNTER — HOSPITAL ENCOUNTER (OUTPATIENT)
Dept: PHYSICAL THERAPY | Age: 77
Discharge: HOME | End: 2017-08-28
Payer: MEDICARE

## 2017-08-28 ENCOUNTER — HOSPITAL ENCOUNTER (OUTPATIENT)
Dept: PHYSICAL THERAPY | Age: 77
End: 2017-08-28
Payer: MEDICARE

## 2017-08-28 PROCEDURE — 97140 MANUAL THERAPY 1/> REGIONS: CPT

## 2017-08-28 PROCEDURE — 97112 NEUROMUSCULAR REEDUCATION: CPT

## 2017-08-28 PROCEDURE — 97016 VASOPNEUMATIC DEVICE THERAPY: CPT

## 2017-08-28 NOTE — PROGRESS NOTES
PT DAILY TREATMENT NOTE - [facility]     Patient Name: Buster Ferrera  Date:2017  : 1940  [x]  Patient  Verified  Payor: Boy Oropeza / Plan: [place] MEDICARE PART A & B / Product Type: Medicare /    In time:Out time:1015  Total Treatment Time (min): 554  Total Timed Codes (min): 54  1:1 Treatment Time ( [address] only): 47   Visit #: 4 of 20    Treatment Area: Right knee pain [M25.561]    SUBJECTIVE  Pain Level (0-10 scale): 0/10  Any medication changes, allergies to medications, adverse drug reactions, diagnosis change, or new procedure performed?: [x] No    [] Yes (see summary sheet for update)  Subjective functional status/changes:   [] No changes reported  Pt reports that he is feeling fine but had a little bit of pain yesterday.       OBJECTIVE    Modality rationale: decrease inflammation and decrease pain to improve the patients ability to improve community ambulation   Min Type Additional Details    [] Estim:  []Unatt       []IFC  []Premod                        []Other:  []w/ice   []w/heat  Position:  Location:    [] Estim: []Att    []TENS instruct  []NMES                    []Other:  []w/US   []w/ice   []w/heat  Position:  Location:    []  Traction: [] Cervical       []Lumbar                       [] Prone          []Supine                       []Intermittent   []Continuous Lbs:  [] before manual  [] after manual    []  Ultrasound: []Continuous   [] Pulsed                           []1MHz   []3MHz W/cm2:  Location:    []  Iontophoresis with dexamethasone         Location: [] Take home patch   [] In clinic    []  Ice     []  heat  []  Ice massage  []  Laser   []  Anodyne Position:  Location:    []  Laser with stim  []  Other:  Position:  Location:   10 [x]  Vasopneumatic Device Pressure:       [] lo [x] med [] hi   Temperature: [] lo [] med [] hi   [x] Skin assessment post-treatment:  [x]intact [x]redness- no adverse reaction    []redness  adverse reaction:         24  1: 1  19 min min Neuromuscular Re-education:  []  See flow sheet :facilitate hip flexor and core strength for improved heel strike   Rationale: increase ROM, increase strength, improve coordination, improve balance and increase proprioception  to improve the patients ability to normalize gait pattern for safety    20 min Manual Therapy:  MFR to anterior/posterior right knee, gentle myofascial leg pull, PROM, manual stretching to hamstring and quads on right, DTM to gastrocsoleus, hamstrings/ITB and peroneals   Rationale: decrease pain, increase ROM, increase tissue extensibility, decrease edema  and decrease trigger points to improve heel strike during gait          With   [] TE   [] TA   [x] neuro   [] other: Patient Education: [x] Review HEP    [] Progressed/Changed HEP based on:   [x] positioning   [x] body mechanics   [] transfers   [] heat/ice application    [] other:      Other Objective/Functional Measures:  AROM right knee     Pain Level (0-10 scale) post treatment: 0/10    ASSESSMENT/Changes in Function: Pt demonstrates improved knee flexion today and slight improvement with extension. Pt's extension limited by quad,hamstring and gastroc soleus length. Therapist addressed mobility of tissues with manual work which improved AROM significantly. Pt will benefit from increased work in standing and pt was encouraged to walk more at home with supervision. Patient will continue to benefit from skilled PT services to address functional mobility deficits, address ROM deficits, address strength deficits, analyze and address soft tissue restrictions, analyze and cue movement patterns, analyze and modify body mechanics/ergonomics, assess and modify postural abnormalities and instruct in home and community integration to attain remaining goals.      []  See Plan of Care  []  See progress note/recertification  []  See Discharge Summary         Progress towards goals / Updated goals:  Short Term Goals: STG- To be accomplished in 4 week(s):  1.  Pt will be independent with HEP to encourage prophylaxis. Eval:dispensed  Current:reviewed      2.  Pt PROM extension will improve to lacking <10 deg to allow pt to ambulate with more normalized gait pattern and least restrictive AD. Eval:lacking 20, RW and lacking ext in stance  Current: 18 degrees PROM, with poor quad activation      Long Term Goals: LTG- To be accomplished in 8 week(s):  1.  Pt will improve quad contraction to strong to allow pt to ambulate with normalized gait pattern for improved community navigation. Eval:poor  Current: progressing with quad sets and TKE in standing      2.  Pt will be able to stand for ADLs and showers to improve functional mobility. Eval:unable to stand for showers or ADLs  Current: added core stability work in sitting and standing      3.  Pt AROM will improve to lacking <10 to 115 knee flexion to allow pt to go up and down stairs in reciprocal fashion for improved home access. Eval:lacking 24-99  Current:  AROM      4.  Pt FOTO score will increase by >20 points to show improvement in right knee function.   Eval:30  Current: will address at visit 5       PLAN  []  Upgrade activities as tolerated     []  Continue plan of care  []  Update interventions per flow sheet       []  Discharge due to:_  []  Other:_      Penny Seen, LIO 8/28/2017  9:20 AM    Future Appointments  Date Time Provider Caprice Leal   8/28/2017 9:30 AM LIO Champagne [facility]   8/30/2017 10:00 AM Vinod [facility]   8/31/2017 10:00 AM Jannell Dancer, PTA [facility]   9/6/2017 4:00 PM Jannell Dancer, PTA [facility]   9/7/2017 10:30 AM Jannell Dancer, PTA [facility]   9/8/2017 3:00 PM Jannell Dancer, PTA [facility]   9/12/2017 9:30 AM Jannell Dancer, PTA [facility]   9/14/2017 10:30 AM Jannell Dancer, PTA [facility]   9/15/2017 3:00 PM Jannell Dancer, PTA [facility]

## 2017-08-30 ENCOUNTER — HOSPITAL ENCOUNTER (OUTPATIENT)
Dept: PHYSICAL THERAPY | Age: 77
Discharge: HOME | End: 2017-08-30
Payer: MEDICARE

## 2017-08-30 PROCEDURE — 97110 THERAPEUTIC EXERCISES: CPT

## 2017-08-30 NOTE — PROGRESS NOTES
PT DAILY TREATMENT NOTE - [facility]     Patient Name: Fouzia Mayers  Date:2017  : 1940  [x]  Patient  Verified  Payor: [place] MEDICARE / Plan: [place] MEDICARE PART A & B / Product Type: Medicare /    In time:03:00  Out time:03:43  Total Treatment Time (min): 43  Total Timed Codes (min): 43  1:1 Treatment Time ( [address] only): 35   Visit #: 5 of 20    Treatment Area: Right knee pain [M25.561]    SUBJECTIVE  Pain Level (0-10 scale): 0/10  Any medication changes, allergies to medications, adverse drug reactions, diagnosis change, or new procedure performed?: [x] No    [] Yes (see summary sheet for update)  Subjective functional status/changes:   [x] No changes reported       OBJECTIVE    43 min Therapeutic Exercise:  [] See flow sheet :   Rationale: increase ROM, increase strength and improve coordination to improve the patients ability to ambulate without AD    Other Objective/Functional Measures: FOTO: 36    Pain Level (0-10 scale) post treatment: 0/10    ASSESSMENT/Changes in Function:   Quad lag continues with increased fatigue noted as leg would \"give\" occasionally. Patient will continue to benefit from skilled PT services to modify and progress therapeutic interventions, address functional mobility deficits, address ROM deficits, address strength deficits and analyze and address soft tissue restrictions to attain remaining goals. []  See Plan of Care  []  See progress note/recertification  []  See Discharge Summary         Progress towards goals / Updated goals:  Short Term Goals: STG- To be accomplished in 4 week(s):  1.  Pt will be independent with HEP to encourage prophylaxis. Eval:dispensed  Current:reviewed      2.  Pt PROM extension will improve to lacking <10 deg to allow pt to ambulate with more normalized gait pattern and least restrictive AD.   Eval:lacking 20, RW and lacking ext in stance  Current: 18 degrees PROM, with poor quad activation      Long Term Goals: LTG- To be accomplished in 8 week(s):  1.  Pt will improve quad contraction to strong to allow pt to ambulate with normalized gait pattern for improved community navigation. Eval:poor  Current: progressing with quad sets and TKE in standing      2.  Pt will be able to stand for ADLs and showers to improve functional mobility. Eval:unable to stand for showers or ADLs  Current: added core stability work in sitting and standing      3.  Pt AROM will improve to lacking <10 to 115 knee flexion to allow pt to go up and down stairs in reciprocal fashion for improved home access. Eval:lacking 24-99  Current:  AROM      4.  Pt FOTO score will increase by >20 points to show improvement in right knee function.   Eval:30  Current: 36, 6 point change    PLAN  []  Upgrade activities as tolerated     [x]  Continue plan of care  []  Update interventions per flow sheet       []  Discharge due to:_  []  Other:_      Miki Clemons 8/30/2017  3:17 PM    Future Appointments  Date Time Provider Caprice Leal   8/31/2017 10:00 AM Lignum Polite, PTA [facility]   9/6/2017 4:00 PM Steve Erickson, PT [facility]   9/7/2017 10:30 AM Lignum Polite, PTA [facility]   9/8/2017 3:00 PM Lignum Polite, PTA [facility]   9/12/2017 9:30 AM Lignum Polite, PTA [facility]   9/14/2017 10:30 AM Lignum Polite, PTA [facility]   9/15/2017 3:00 PM Lignum Polite, PTA [facility]

## 2017-08-31 ENCOUNTER — HOSPITAL ENCOUNTER (OUTPATIENT)
Dept: PHYSICAL THERAPY | Age: 77
Discharge: HOME | End: 2017-08-31
Payer: MEDICARE

## 2017-08-31 PROCEDURE — 97016 VASOPNEUMATIC DEVICE THERAPY: CPT

## 2017-08-31 PROCEDURE — 97112 NEUROMUSCULAR REEDUCATION: CPT

## 2017-08-31 PROCEDURE — 97110 THERAPEUTIC EXERCISES: CPT

## 2017-08-31 PROCEDURE — 97140 MANUAL THERAPY 1/> REGIONS: CPT

## 2017-08-31 NOTE — PROGRESS NOTES
PT DAILY TREATMENT NOTE - [facility]     Patient Name: Pablito Patel  WZGA:[birth year]  : 1940  [x]  Patient  Verified  Payor: [place] MEDICARE / Plan: [place] MEDICARE PART A & B / Product Type: Medicare /    In DGOM:3113  Out time:1050  Total Treatment Time (min): 52  Total Timed Codes (min): 42  1:1 Treatment Time ( only): 44   Visit #: 6 of 20    Treatment Area: Right knee pain [M25.561]    SUBJECTIVE  Pain Level (0-10 scale): 0/10  Any medication changes, allergies to medications, adverse drug reactions, diagnosis change, or new procedure performed?: [x] No    [] Yes (see summary sheet for update)  Subjective functional status/changes:   [x] No changes reported    OBJECTIVE     Modality rationale: decrease edema, decrease inflammation and decrease pain to improve the patients ability to improve end range extension for gait and ADL's   Min Type Additional Details    [] Estim:  []Unatt       []IFC  []Premod                        []Other:  []w/ice   []w/heat  Position:  Location:    [] Estim: []Att    []TENS instruct  []NMES                    []Other:  []w/US   []w/ice   []w/heat  Position:  Location:    []  Traction: [] Cervical       []Lumbar                       [] Prone          []Supine                       []Intermittent   []Continuous Lbs:  [] before manual  [] after manual    []  Ultrasound: []Continuous   [] Pulsed                           []1MHz   []3MHz W/cm2:  Location:    []  Iontophoresis with dexamethasone         Location: [] Take home patch   [] In clinic    []  Ice     []  heat  []  Ice massage  []  Laser   []  Anodyne Position:  Location:    []  Laser with stim  []  Other:  Position:  Location:   10 [x]  Vasopneumatic Device right knee Pressure:       [] lo [x] med [] hi   Temperature: [x] lo [] med [] hi   [x] Skin assessment post-treatment:  [x]intact [x]redness- no adverse reaction    []redness  adverse reaction:      min []Eval                  []Re-Eval       26 min Therapeutic Exercise:  [x] See flow sheet :   Rationale: increase ROM, increase strength and improve coordination to improve the patients ability to improve gait and ADL's     min Therapeutic Activity:  []  See flow sheet :        8 min Neuromuscular Re-education:  -  See flow sheet :static and dynamic balance   Rationale: increase ROM, increase strength, improve coordination, improve balance and increase proprioception  to improve the patients ability to improve gait and balance with ADL's    8 min Manual Therapy:  AP mobs to right knee to promote extension   Rationale: increase ROM and increase tissue extensibility to improve gait      min Gait Training:  ___ feet with ___ device on level surfaces with ___ level of assist   Rationale: With   [] TE   [] TA   [] neuro   [] other: Patient Education: [x] Review HEP    [] Progressed/Changed HEP based on:   [] positioning   [] body mechanics   [] transfers   [] heat/ice application    [] other:      Other Objective/Functional Measures: Right knee AROM/PROM: 15/12 - 108/110     Pain Level (0-10 scale) post treatment: 0/10    ASSESSMENT/Changes in Function: Pt making slow gains with end range extension to improve gait and balance. Patient will continue to benefit from skilled PT services to modify and progress therapeutic interventions, address ROM deficits, address strength deficits, analyze and address soft tissue restrictions, analyze and cue movement patterns, analyze and modify body mechanics/ergonomics, assess and modify postural abnormalities, address imbalance/dizziness and instruct in home and community integration to attain remaining goals. [x]  See Plan of Care  []  See progress note/recertification  []  See Discharge Summary         Progress towards goals / Updated goals:  Short Term Goals: STG- To be accomplished in 4 week(s):  1.  Pt will be independent with HEP to encourage prophylaxis.   Eval:dispensed  Current:reviewed HEP with pt using \"dynasplint\" for low grade stretch to promote extension      2.  Pt PROM extension will improve to lacking <10 deg to allow pt to ambulate with more normalized gait pattern and least restrictive AD. Eval:lacking 20, RW and lacking ext in stance  Current: 12 degrees PROM with manual overpressure, with poor quad activation      Long Term Goals: LTG- To be accomplished in 8 week(s):  1.  Pt will improve quad contraction to strong to allow pt to ambulate with normalized gait pattern for improved community navigation. Eval:poor  Current: progressing with quad sets and TKE in standing      2.  Pt will be able to stand for ADLs and showers to improve functional mobility. Eval:unable to stand for showers or ADLs  Current: added core stability work in sitting and standing; pt ambulating using RW      3.  Pt AROM will improve to lacking <10 to 115 knee flexion to allow pt to go up and down stairs in reciprocal fashion for improved home access. Eval:lacking 24-99  Current: lacking 15 degrees AROM ext; 15/12 - 108/110      4.  Pt FOTO score will increase by >20 points to show improvement in right knee function.   Eval:30  Current: 36, 6 point change       PLAN  []  Upgrade activities as tolerated     [x]  Continue plan of care  []  Update interventions per flow sheet       []  Discharge due to:_  []  Other:_      Kasey Cornelius PTA 8/31/2017  10:06 AM    Future Appointments  Date Time Provider Caprice Leal   9/6/2017 4:00 PM Stacy 145, [facility]   9/7/2017 10:30 AM LIO OscarHPTDEONDRE [facility]   9/8/2017 3:00 PM Kasey Cornelius PTA [facility]   9/12/2017 9:30 AM Kasey Cornelius PTA [facility]   9/14/2017 10:30 AM LIO OscarHPTVIRENA [facility]   9/15/2017 3:00 PM Kasey Cornelius PTA [facility]

## 2017-09-01 ENCOUNTER — APPOINTMENT (OUTPATIENT)
Dept: PHYSICAL THERAPY | Age: 77
End: 2017-09-01
Payer: MEDICARE

## 2017-09-05 ENCOUNTER — HOSPITAL ENCOUNTER (OUTPATIENT)
Dept: PHYSICAL THERAPY | Age: 77
Discharge: HOME | End: 2017-09-05
Payer: MEDICARE

## 2017-09-05 PROCEDURE — 97110 THERAPEUTIC EXERCISES: CPT

## 2017-09-05 PROCEDURE — 97140 MANUAL THERAPY 1/> REGIONS: CPT

## 2017-09-05 PROCEDURE — 97016 VASOPNEUMATIC DEVICE THERAPY: CPT

## 2017-09-05 PROCEDURE — 97112 NEUROMUSCULAR REEDUCATION: CPT

## 2017-09-05 NOTE — PROGRESS NOTES
PT DAILY TREATMENT NOTE - [facility]     Patient Name: Jaja Edmonds  Date:2017  : 1940  [x]  Patient  Verified  Payor: [place] MEDICARE / Plan: [place] MEDICARE PART A & B / Product Type: Medicare /    In KEOT:4594  Out time:1047  Total Treatment Time (min): 52  Total Timed Codes (min): 42  1:1 Treatment Time ( only): 42   Visit #: 7 of 20    Treatment Area: Right knee pain [M25.561]    SUBJECTIVE  Pain Level (0-10 scale): 0/10  Any medication changes, allergies to medications, adverse drug reactions, diagnosis change, or new procedure performed?: [x] No    [] Yes (see summary sheet for update)  Subjective functional status/changes:   [x] No changes reported      OBJECTIVE    Modality rationale: decrease inflammation and decrease pain to improve the patients ability to improve gait and ADL's   Min Type Additional Details    [] Estim:  []Unatt       []IFC  []Premod                        []Other:  []w/ice   []w/heat  Position:  Location:    [] Estim: []Att    []TENS instruct  []NMES                    []Other:  []w/US   []w/ice   []w/heat  Position:  Location:    []  Traction: [] Cervical       []Lumbar                       [] Prone          []Supine                       []Intermittent   []Continuous Lbs:  [] before manual  [] after manual    []  Ultrasound: []Continuous   [] Pulsed                           []1MHz   []3MHz W/cm2:  Location:    []  Iontophoresis with dexamethasone         Location: [] Take home patch   [] In clinic    []  Ice     []  heat  []  Ice massage  []  Laser   []  Anodyne Position:  Location:    []  Laser with stim  []  Other:  Position:  Location:   10 [x]  Vasopneumatic Device right knee with heel floated on wedge for 5 min Pressure:       [] lo [x] med [] hi   Temperature: [x] lo [] med [] hi   [x] Skin assessment post-treatment:  [x]intact []redness- no adverse reaction    []redness  adverse reaction:      min []Eval                  []Re-Eval       22 min Therapeutic Exercise:  [x] See flow sheet :   Rationale: increase ROM, increase strength and improve coordination to improve the patients ability to improve gait and ADL's     min Therapeutic Activity:  []  See flow sheet :        10 min Neuromuscular Re-education:  [x]  See flow sheet :static and dynamic balance   Rationale: increase strength, improve coordination, improve balance and increase proprioception  to improve the patients ability to improve balance and safety with gait and ADL's    10 min Manual Therapy:  AP mobs to promote TKE on right and contract-relax to promote HS flexibility on right   Rationale: increase ROM and increase tissue extensibility to improve gait and ADL's     min Gait Training:  ___ feet with ___ device on level surfaces with ___ level of assist   Rationale: With   [] TE   [] TA   [] neuro   [] other: Patient Education: [x] Review HEP    [] Progressed/Changed HEP based on:   [] positioning   [] body mechanics   [] transfers   [] heat/ice application    [] other:      Other Objective/Functional Measures:      Pain Level (0-10 scale) post treatment: 0/10    ASSESSMENT/Changes in Function: No new changes. Pt continues to have limited right foot clearance in swing phase of gait that improves with cueing. Patient will continue to benefit from skilled PT services to modify and progress therapeutic interventions, address functional mobility deficits, address ROM deficits, address strength deficits, analyze and address soft tissue restrictions, analyze and cue movement patterns, analyze and modify body mechanics/ergonomics, assess and modify postural abnormalities, address imbalance/dizziness and instruct in home and community integration to attain remaining goals.      [x]  See Plan of Care  []  See progress note/recertification  []  See Discharge Summary         Progress towards goals / Updated goals:  Short Term Goals: STG- To be accomplished in 4 week(s):  1.  Pt will be independent with HEP to encourage prophylaxis. Eval:dispensed  Current:reviewed HEP with pt using \"dynasplint\" for low grade stretch to promote extension      2.  Pt PROM extension will improve to lacking <10 deg to allow pt to ambulate with more normalized gait pattern and least restrictive AD. Eval:lacking 20, RW and lacking ext in stance  Current: 12 degrees PROM with manual overpressure, with poor quad activation      Long Term Goals: LTG- To be accomplished in 8 week(s):  1.  Pt will improve quad contraction to strong to allow pt to ambulate with normalized gait pattern for improved community navigation. Eval:poor  Current: progressing with quad sets and TKE in standing      2.  Pt will be able to stand for ADLs and showers to improve functional mobility. Eval:unable to stand for showers or ADLs  Current: added core stability work in sitting and standing; pt ambulating using RW      3.  Pt AROM will improve to lacking <10 to 115 knee flexion to allow pt to go up and down stairs in reciprocal fashion for improved home access. Eval:lacking 24-99  Current: lacking 15 degrees AROM ext; 15/12 - 108/110      4.  Pt FOTO score will increase by >20 points to show improvement in right knee function.   Eval:30  Current: 36, 6 point change       PLAN  []  Upgrade activities as tolerated     [x]  Continue plan of care  []  Update interventions per flow sheet       []  Discharge due to:_  []  Other:_      Liz Mcqueen PTA 9/5/2017  10:01 AM    Future Appointments  Date Time Provider Caprice Leal   9/7/2017 10:30 AM Liz Mcqueen PTA [facility]   9/8/2017 3:00 PM Liz Mcqueen, PTA [facility]   9/12/2017 9:30 AM Liz Mcqueen PTA [facility]   9/14/2017 10:30 AM Liz Mcqueen, PTA [facility]   9/15/2017 3:00 PM Liz Mcqueen PTA [facility]

## 2017-09-06 ENCOUNTER — APPOINTMENT (OUTPATIENT)
Dept: PHYSICAL THERAPY | Age: 77
End: 2017-09-06
Payer: MEDICARE

## 2017-09-07 ENCOUNTER — HOSPITAL ENCOUNTER (OUTPATIENT)
Dept: PHYSICAL THERAPY | Age: 77
Discharge: HOME | End: 2017-09-07
Payer: MEDICARE

## 2017-09-07 PROCEDURE — 97016 VASOPNEUMATIC DEVICE THERAPY: CPT

## 2017-09-07 PROCEDURE — 97110 THERAPEUTIC EXERCISES: CPT

## 2017-09-07 PROCEDURE — 97112 NEUROMUSCULAR REEDUCATION: CPT

## 2017-09-07 NOTE — PROGRESS NOTES
PT DAILY TREATMENT NOTE - [facility]     Patient Name: Jose Sullivan  Date:2017  : 1940  [x]  Patient  Verified  Payor: [place] MEDICARE / Plan: [place] MEDICARE PART A & B / Product Type: Medicare /    In time:1025  Out time:1110  Total Treatment Time (min): 45  Total Timed Codes (min): 35  1:1 Treatment Time ( only): 35   Visit #: 8 of 20    Treatment Area: Right knee pain [M25.561]    SUBJECTIVE  Pain Level (0-10 scale): 0/10  Any medication changes, allergies to medications, adverse drug reactions, diagnosis change, or new procedure performed?: [x] No    [] Yes (see summary sheet for update)  Subjective functional status/changes:   [] No changes reported  The MD was pleased with my progress.     OBJECTIVE    Modality rationale: decrease inflammation and decrease pain to improve the patients ability to improve gait and ADL's   Min Type Additional Details    [] Estim:  []Unatt       []IFC  []Premod                        []Other:  []w/ice   []w/heat  Position:  Location:    [] Estim: []Att    []TENS instruct  []NMES                    []Other:  []w/US   []w/ice   []w/heat  Position:  Location:    []  Traction: [] Cervical       []Lumbar                       [] Prone          []Supine                       []Intermittent   []Continuous Lbs:  [] before manual  [] after manual    []  Ultrasound: []Continuous   [] Pulsed                           []1MHz   []3MHz W/cm2:  Location:    []  Iontophoresis with dexamethasone         Location: [] Take home patch   [] In clinic    []  Ice     []  heat  []  Ice massage  []  Laser   []  Anodyne Position:  Location:    []  Laser with stim  []  Other:  Position:  Location:   10 [x]  Vasopneumatic Device right knee with heel floated for 5 minutes on wedge Pressure:       [] lo [x] med [] hi   Temperature: [x] lo [] med [] hi   [x] Skin assessment post-treatment:  [x]intact []redness- no adverse reaction    []redness  adverse reaction:      min []Eval []Re-Eval       25 min Therapeutic Exercise:  [x] See flow sheet :   Rationale: increase ROM and increase strength to improve the patients ability to improve gait and ADL's     min Therapeutic Activity:  []  See flow sheet :        10 min Neuromuscular Re-education:  [x]  See flow sheet :   Rationale: increase strength, improve coordination, improve balance and increase proprioception  to improve the patients ability to improve gait and balance     min Manual Therapy:          min Gait Training:  ___ feet with ___ device on level surfaces with ___ level of assist   Rationale: With   [] TE   [] TA   [] neuro   [] other: Patient Education: [x] Review HEP    [] Progressed/Changed HEP based on:   [] positioning   [] body mechanics   [] transfers   [] heat/ice application    [] other:      Other Objective/Functional Measures: Right knee AROM/PROM: 15/5 - 110/112     Pain Level (0-10 scale) post treatment: 0/10    ASSESSMENT/Changes in Function: Pt making slow improvements in extension with springy end feel. Patient will continue to benefit from skilled PT services to modify and progress therapeutic interventions, address functional mobility deficits, address ROM deficits, address strength deficits, analyze and address soft tissue restrictions, analyze and cue movement patterns, analyze and modify body mechanics/ergonomics, assess and modify postural abnormalities, address imbalance/dizziness and instruct in home and community integration to attain remaining goals. [x]  See Plan of Care  []  See progress note/recertification  []  See Discharge Summary         Progress towards goals / Updated goals:  Short Term Goals: STG- To be accomplished in 4 week(s):  1.  Pt will be independent with HEP to encourage prophylaxis.   Eval:dispensed  Current:reviewed HEP with pt using \"dynasplint\" for low grade stretch to promote extension      2.  Pt PROM extension will improve to lacking <10 deg to allow pt to ambulate with more normalized gait pattern and least restrictive AD. Eval:lacking 20, RW and lacking ext in stance  Current: 5 degrees PROM with manual overpressure, with poor quad activation slowly improving      Long Term Goals: LTG- To be accomplished in 8 week(s):  1.  Pt will improve quad contraction to strong to allow pt to ambulate with normalized gait pattern for improved community navigation. Eval:poor  Current: progressing with quad sets and TKE in standing      2.  Pt will be able to stand for ADLs and showers to improve functional mobility. Eval:unable to stand for showers or ADLs  Current: added core stability work in sitting and standing; pt ambulating using RW      3.  Pt AROM will improve to lacking <10 to 115 knee flexion to allow pt to go up and down stairs in reciprocal fashion for improved home access. Eval:lacking 24-99  Current: lacking 15 degrees AROM ext; 15/5 - 110/112      4.  Pt FOTO score will increase by >20 points to show improvement in right knee function.   Eval:30  Current: 36, 6 point change       PLAN  [x]  Upgrade activities as tolerated     [x]  Continue plan of care  []  Update interventions per flow sheet       []  Discharge due to:_  []  Other:_      Alcus [place], PTA 9/7/2017  10:35 AM    Future Appointments  Date Time Provider Caprice Leal   9/8/2017 3:00 PM Alcus [place], PTA [facility]   9/12/2017 9:30 AM Alcus Soham, PTA [facility]   9/14/2017 10:30 AM Alcus Soham, PTA [facility]   9/15/2017 3:00 PM Alcus Soham, PTA [facility]

## 2017-09-08 ENCOUNTER — HOSPITAL ENCOUNTER (OUTPATIENT)
Dept: PHYSICAL THERAPY | Age: 77
Discharge: HOME | End: 2017-09-08
Payer: MEDICARE

## 2017-09-08 PROCEDURE — 97110 THERAPEUTIC EXERCISES: CPT

## 2017-09-08 PROCEDURE — 97016 VASOPNEUMATIC DEVICE THERAPY: CPT

## 2017-09-08 NOTE — PROGRESS NOTES
PT DAILY TREATMENT NOTE - [facility]     Patient Name: Jose Sullivan  Date:2017  : 1940  [x]  Patient  Verified  Payor: [place] MEDICARE / Plan: VA MEDICARE PART A & B / Product Type: Medicare /    In time:1505  Out time:1350  Total Treatment Time (min): 45  Total Timed Codes (min): 35  1:1 Treatment Time ( only): 35   Visit #: 9 of 20    Treatment Area: Right knee pain [M25.561]    SUBJECTIVE  Pain Level (0-10 scale): 0/10  Any medication changes, allergies to medications, adverse drug reactions, diagnosis change, or new procedure performed?: [x] No    [] Yes (see summary sheet for update)  Subjective functional status/changes:   [x] No changes reported    OBJECTIVE    Modality rationale: decrease edema, decrease inflammation and decrease pain to improve the patients ability to improve gait and ADL's    Min Type Additional Details    [] Estim:  []Unatt       []IFC  []Premod                        []Other:  []w/ice   []w/heat  Position:  Location:    [] Estim: []Att    []TENS instruct  []NMES                    []Other:  []w/US   []w/ice   []w/heat  Position:  Location:    []  Traction: [] Cervical       []Lumbar                       [] Prone          []Supine                       []Intermittent   []Continuous Lbs:  [] before manual  [] after manual    []  Ultrasound: []Continuous   [] Pulsed                           []1MHz   []3MHz W/cm2:  Location:    []  Iontophoresis with dexamethasone         Location: [] Take home patch   [] In clinic    []  Ice     []  heat  []  Ice massage  []  Laser   []  Anodyne Position:  Location:    []  Laser with stim  []  Other:  Position:  Location:   10 [x]  Vasopneumatic Device right knee Pressure:       [] lo [x] med [] hi   Temperature: [x] lo [] med [] hi   [] Skin assessment post-treatment:  []intact []redness- no adverse reaction    []redness  adverse reaction:      min []Eval                  []Re-Eval       35 min Therapeutic Exercise:  [x] See flow sheet : Rationale: increase ROM, increase strength, improve coordination, improve balance and increase proprioception to improve the patients ability to improve gait and ADL's     min Therapeutic Activity:  []  See flow sheet :         min Neuromuscular Re-education:  []  See flow sheet :        min Manual Therapy:          min Gait Training:  ___ feet with ___ device on level surfaces with ___ level of assist   Rationale: With   [] TE   [] TA   [] neuro   [] other: Patient Education: [x] Review HEP    [] Progressed/Changed HEP based on:   [] positioning   [] body mechanics   [] transfers   [] heat/ice application    [x] other: increasing time in splint to promote extension     Other Objective/Functional Measures:      Pain Level (0-10 scale) post treatment: 0/10    ASSESSMENT/Changes in Function: Pt continues to present with limitations in end range knee extension with springy end feel. Patient will continue to benefit from skilled PT services to modify and progress therapeutic interventions, address functional mobility deficits, address ROM deficits, address strength deficits, analyze and address soft tissue restrictions, analyze and cue movement patterns, analyze and modify body mechanics/ergonomics, assess and modify postural abnormalities, address imbalance/dizziness and instruct in home and community integration to attain remaining goals. [x]  See Plan of Care  []  See progress note/recertification  []  See Discharge Summary         Progress towards goals / Updated goals:  Short Term Goals: STG- To be accomplished in 4 week(s):  1.  Pt will be independent with HEP to encourage prophylaxis. Eval:dispensed  Current:reviewed HEP with pt using \"dynasplint\" for low grade stretch to promote extension      2.  Pt PROM extension will improve to lacking <10 deg to allow pt to ambulate with more normalized gait pattern and least restrictive AD.   Eval:lacking 20, RW and lacking ext in stance  Current: 5 degrees PROM with manual overpressure, with poor quad activation slowly improving      Long Term Goals: LTG- To be accomplished in 8 week(s):  1.  Pt will improve quad contraction to strong to allow pt to ambulate with normalized gait pattern for improved community navigation. Eval:poor  Current: progressing with quad sets and TKE in standing      2.  Pt will be able to stand for ADLs and showers to improve functional mobility. Eval:unable to stand for showers or ADLs  Current: added core stability work in sitting and standing; pt ambulating using RW      3.  Pt AROM will improve to lacking <10 to 115 knee flexion to allow pt to go up and down stairs in reciprocal fashion for improved home access. Eval:lacking 24-99  Current: lacking 15 degrees AROM ext; 15/5 - 110/112      4.  Pt FOTO score will increase by >20 points to show improvement in right knee function.   Eval:30  Current: 36, 6 point change          PLAN  [x]  Upgrade activities as tolerated     [x]  Continue plan of care  []  Update interventions per flow sheet       []  Discharge due to:_  []  Other:_      Archie Palacio PTA 9/8/2017  3:23 PM    Future Appointments  Date Time Provider Caprice Lael   9/12/2017 11:00 AM Archie Palacio PTA [facility]   9/14/2017 10:30 AM Archie Palacio PTA [facility]   9/15/2017 3:00 PM Archie Palacio PTA [facility]

## 2017-09-12 ENCOUNTER — HOSPITAL ENCOUNTER (OUTPATIENT)
Dept: PHYSICAL THERAPY | Age: 77
Discharge: HOME | End: 2017-09-12
Payer: MEDICARE

## 2017-09-12 PROCEDURE — 97140 MANUAL THERAPY 1/> REGIONS: CPT

## 2017-09-12 PROCEDURE — 97016 VASOPNEUMATIC DEVICE THERAPY: CPT

## 2017-09-12 PROCEDURE — 97110 THERAPEUTIC EXERCISES: CPT

## 2017-09-12 PROCEDURE — G8979 MOBILITY GOAL STATUS: HCPCS

## 2017-09-12 PROCEDURE — G8978 MOBILITY CURRENT STATUS: HCPCS

## 2017-09-12 NOTE — PROGRESS NOTES
PT DAILY TREATMENT NOTE - [facility]     Patient Name: Hadley Dey  Date:2017  : 1940  [x]  Patient  Verified  Payor: Neftali Pore / Plan: [place] MEDICARE PART A & B / Product Type: Medicare /    In time:1100  Out time:1145  Total Treatment Time (min): 45  Total Timed Codes (min): 35  1:1 Treatment Time ( only): 35   Visit #: 10 of 20    Treatment Area: Right knee pain [M25.561]    SUBJECTIVE  Pain Level (0-10 scale): 0/10  Any medication changes, allergies to medications, adverse drug reactions, diagnosis change, or new procedure performed?: [x] No    [] Yes (see summary sheet for update)  Subjective functional status/changes:   [x] No changes reported    OBJECTIVE    Modality rationale: decrease pain and increase tissue extensibility to improve the patients ability to improve end range extension and gait   Min Type Additional Details    [] Estim:  []Unatt       []IFC  []Premod                        []Other:  []w/ice   []w/heat  Position:  Location:    [] Estim: []Att    []TENS instruct  []NMES                    []Other:  []w/US   []w/ice   []w/heat  Position:  Location:    []  Traction: [] Cervical       []Lumbar                       [] Prone          []Supine                       []Intermittent   []Continuous Lbs:  [] before manual  [] after manual    []  Ultrasound: []Continuous   [] Pulsed                           []1MHz   []3MHz W/cm2:  Location:    []  Iontophoresis with dexamethasone         Location: [] Take home patch   [] In clinic    []  Ice     []  heat  []  Ice massage  []  Laser   []  Anodyne Position:  Location:    []  Laser with stim  []  Other:  Position:  Location:   10 [x]  Vasopneumatic Device Pressure:       [] lo [] med [] hi   Temperature: [] lo [] med [] hi   [] Skin assessment post-treatment:  []intact []redness- no adverse reaction    []redness  adverse reaction:      min []Eval                  []Re-Eval       27 min Therapeutic Exercise:  [x] See flow sheet : Rationale: increase ROM, increase strength, improve coordination, improve balance and increase proprioception to improve the patients ability to improve gait and ADL's     min Therapeutic Activity:  []  See flow sheet :         min Neuromuscular Re-education:  []  See flow sheet :       8 min Manual Therapy:  AP mobs to promote end range extension, manually facilitated HS stretch   Rationale: increase ROM to improve gait and ADL's     min Gait Training:  ___ feet with ___ device on level surfaces with ___ level of assist   Rationale: With   [] TE   [] TA   [] neuro   [] other: Patient Education: [x] Review HEP    [] Progressed/Changed HEP based on:   [] positioning   [] body mechanics   [] transfers   [] heat/ice application    [] other:      Other Objective/Functional Measures: FOTO: 34/100     Pain Level (0-10 scale) post treatment: 0/10    ASSESSMENT/Changes in Function: Pt continues to ambulate using RW with limited heel strike or WB through heel in static stance with end range extension limitations persisting. Patient will continue to benefit from skilled PT services to modify and progress therapeutic interventions, address ROM deficits, address strength deficits, analyze and address soft tissue restrictions, analyze and cue movement patterns, analyze and modify body mechanics/ergonomics, assess and modify postural abnormalities, address imbalance/dizziness and instruct in home and community integration to attain remaining goals. [x]  See Plan of Care  []  See progress note/recertification  []  See Discharge Summary         Progress towards goals / Updated goals:  Short Term Goals: STG- To be accomplished in 4 week(s):  1.  Pt will be independent with HEP to encourage prophylaxis.   Eval:dispensed  Current:reviewed with pt compliant using dynasplint at home.      2.  Pt PROM extension will improve to lacking <10 deg to allow pt to ambulate with more normalized gait pattern and least restrictive AD. Eval:lacking 20, RW and lacking ext in stance  Current: 18 degrees PROM, with poor quad activation      Long Term Goals: LTG- To be accomplished in 8 week(s):  1.  Pt will improve quad contraction to strong to allow pt to ambulate with normalized gait pattern for improved community navigation. Eval:poor  Current: progressing with quad sets and TKE in standing      2.  Pt will be able to stand for ADLs and showers to improve functional mobility. Eval:unable to stand for showers or ADLs  Current: added core stability work in sitting and standing      3.  Pt AROM will improve to lacking <10 to 115 knee flexion to allow pt to go up and down stairs in reciprocal fashion for improved home access. Eval:lacking 24-99  Current:  AROM      4.  Pt FOTO score will increase by >20 points to show improvement in right knee function. Eval:30  Current: 34/100 no new progress       PLAN  []  Upgrade activities as tolerated     [x]  Continue plan of care  []  Update interventions per flow sheet       []  Discharge due to:_  [x]  Other:_PN to continue for additional two week period to ready for discharge at that time if no additional progress has been made with end range extension at that time.       Fracisco Mcmanus PTA 9/12/2017  10:57 AM    Future Appointments  Date Time Provider Caprice Leal   9/12/2017 11:00 AM LIO Clemens [facility]   9/14/2017 10:30 AM LIO Clemens [facility]   9/15/2017 3:00 PM LIO Clemens [facility]

## 2017-09-13 NOTE — PROGRESS NOTES
[facility] at [address] [place] [address]  Phone: [phone]   Fax: [phone]    Medicare Progress Report      Patient name: Love Schwartz     Start of Care: 2017   Referral source: Kym Martin MD    : 1940  Medical/Treatment Diagnosis: Right knee pain [M25.561]  Onset Date:2017  Prior Hospitalization: see medical history   Provider#: [id number]  Comorbidities: HTN, mini-stroke history   Prior Level of Function: standing for ADLs and showers, RW to ambulate prior to TKR (since 2017 due to neck surgery), pain with everyday activities  Medications: Verified on Patient Summary List    Visits from Start of Care: 10    Missed Visits: 0  Reporting Period : 2017 to 2017    Subjective Reports: Patient reports no new changes however in the past visit he stated MD was pleased with his progress. Short Term Goals: STG- To be accomplished in 4 week(s):  1.  Pt will be independent with HEP to encourage prophylaxis. Eval:dispensed  Current:reviewed with pt compliant using dynasplint at home.      2.  Pt PROM extension will improve to lacking <10 deg to allow pt to ambulate with more normalized gait pattern and least restrictive AD. Eval:lacking 20, RW and lacking ext in stance  Current: 18 degrees PROM, with poor quad activation      Long Term Goals: LTG- To be accomplished in 8 week(s):  1.  Pt will improve quad contraction to strong to allow pt to ambulate with normalized gait pattern for improved community navigation. Eval:poor  Current: progressing with quad sets and TKE in standing      2.  Pt will be able to stand for ADLs and showers to improve functional mobility. Eval:unable to stand for showers or ADLs  Current: added core stability work in sitting and standing      3.  Pt AROM will improve to lacking <10 to 115 knee flexion to allow pt to go up and down stairs in reciprocal fashion for improved home access.   Eval:lacking 24-99  Current:  AROM      4.  Pt FOTO score will increase by >20 points to show improvement in right knee function. Eval:30  Current: 34/100 no new progress    Key functional changes: improved overall knee AROM     Problems/ barriers to goal attainment: none     Assessment / Recommendations:  Patient making progress towards goals set with improved ROM. However, patient continues to ambulate with RW and minimal change with quad strength to improve extension with gait cycles. Therefore, PT recommends continuation of therapy services at this time to further progress deficits with focus on improving quad strength knee ROM to increase functional mobility with least restrictive AD for two more weeks. Problem List: pain affecting function, decrease ROM, decrease strength, edema affecting function, impaired gait/ balance, decrease ADL/ functional abilitiies, decrease activity tolerance, decrease flexibility/ joint mobility and decrease transfer abilities   Treatment Plan: Therapeutic exercise, Therapeutic activities, Neuromuscular re-education, Physical agent/modality, Gait/balance training, Manual therapy, Patient education and Stair training    Patient Goal (s) has been updated and includes: same goal \"getting rid of this thing (RW)\"    Updated Goals to be accomplished in 2 weeks:  Continue with unmet goals    Frequency / Duration: Patient to be seen 3 times per week for 2 weeks:    G-Codes (GP)  Mobility  [id number] Current  CL= 60-79%   Goal  CK= 40-59%    The severity rating is based on clinical judgement and the FOTO score.       Nella Negro 9/13/2017 5:10 PM

## 2017-09-14 ENCOUNTER — HOSPITAL ENCOUNTER (OUTPATIENT)
Dept: PHYSICAL THERAPY | Age: 77
Discharge: HOME | End: 2017-09-14
Payer: MEDICARE

## 2017-09-14 PROCEDURE — 97016 VASOPNEUMATIC DEVICE THERAPY: CPT

## 2017-09-14 PROCEDURE — 97110 THERAPEUTIC EXERCISES: CPT

## 2017-09-14 PROCEDURE — 97140 MANUAL THERAPY 1/> REGIONS: CPT

## 2017-09-14 NOTE — PROGRESS NOTES
PT DAILY TREATMENT NOTE - [facility]     Patient Name: Dg Christianson  Date:2017  : 1940  [x]  Patient  Verified  Payor: Meghna Izquierdo / Plan: [place] / Product Type: Medicare /    In time:1030  Out time:1125  Total Treatment Time (min): 55  Total Timed Codes (min): 45  1:1 Treatment Time ( [address] only): 38   Visit #: 11 of 16    Treatment Area: Right knee pain [M25.561]    SUBJECTIVE  Pain Level (0-10 scale): 0/10  Any medication changes, allergies to medications, adverse drug reactions, diagnosis change, or new procedure performed?: [x] No    [] Yes (see summary sheet for update)  Subjective functional status/changes:   [] No changes reported  I am using the splint for 2 hours twice a day.     OBJECTIVE    Modality rationale: decrease pain and increase tissue extensibility to improve the patients ability to promote end range extension to improve gait    Min Type Additional Details    [] Estim:  []Unatt       []IFC  []Premod                        []Other:  []w/ice   []w/heat  Position:  Location:    [] Estim: []Att    []TENS instruct  []NMES                    []Other:  []w/US   []w/ice   []w/heat  Position:  Location:    []  Traction: [] Cervical       []Lumbar                       [] Prone          []Supine                       []Intermittent   []Continuous Lbs:  [] before manual  [] after manual    []  Ultrasound: []Continuous   [] Pulsed                           []1MHz   []3MHz W/cm2:  Location:    []  Iontophoresis with dexamethasone         Location: [] Take home patch   [] In clinic    []  Ice     []  heat  []  Ice massage  []  Laser   []  Anodyne Position:  Location:    []  Laser with stim  []  Other:  Position:  Location:   10 [x]  Vasopneumatic Device right knee Pressure:       [] lo [x] med [] hi   Temperature: [x] lo [] med [] hi   [x] Skin assessment post-treatment:  [x]intact []redness- no adverse reaction    []redness  adverse reaction:      min []Eval []Re-Eval       35 min Therapeutic Exercise:  [x] See flow sheet :   Rationale: increase ROM, increase strength, improve coordination, improve balance and increase proprioception to improve the patients ability to improve gait and ADL's     min Therapeutic Activity:  []  See flow sheet :         min Neuromuscular Re-education:  []  See flow sheet :       10 min Manual Therapy: contract/relax right HS and AP mobs to right knee for extension    Rationale: increase ROM and increase tissue extensibility to improve end range extension on right knee to improve gait and ADL's     min Gait Training:  ___ feet with ___ device on level surfaces with ___ level of assist   Rationale: With   [] TE   [] TA   [] neuro   [] other: Patient Education: [x] Review HEP    [] Progressed/Changed HEP based on:   [] positioning   [] body mechanics   [] transfers   [] heat/ice application    [] other:      Other Objective/Functional Measures:  See goal review    Pain Level (0-10 scale) post treatment: 0/10    ASSESSMENT/Changes in Function: Pt continues to lack end range extension on right LE with decreased quad activation to promote extension in static stance and gait. Patient will continue to benefit from skilled PT services to modify and progress therapeutic interventions, address functional mobility deficits, address ROM deficits, address strength deficits, analyze and address soft tissue restrictions, analyze and cue movement patterns, analyze and modify body mechanics/ergonomics, assess and modify postural abnormalities, address imbalance/dizziness and instruct in home and community integration to attain remaining goals. []  See Plan of Care  [x]  See progress note/recertification  []  See Discharge Summary         Progress towards goals / Updated goals:  Short Term Goals: STG- To be accomplished in 4 week(s):  1.  Pt will be independent with HEP to encourage prophylaxis.   Eval:dispensed  Current:reviewed with pt compliant using dynasplint at home and progressing time for static stretch.      2.  Pt PROM extension will improve to lacking <10 deg to allow pt to ambulate with more normalized gait pattern and least restrictive AD. Eval:lacking 20, RW and lacking ext in stance  Current: 18 degrees PROM, with poor quad activation: no change since last tested with springy end feel      Long Term Goals: LTG- To be accomplished in 8 week(s):  1.  Pt will improve quad contraction to strong to allow pt to ambulate with normalized gait pattern for improved community navigation. Eval:poor  Status at PN: progressing with quad sets and TKE in standing  Current:   pt continues to use RW with limited extension on right  2.  Pt will be able to stand for ADLs and showers to improve functional mobility. Eval:unable to stand for showers or ADLs  Current: static stance progressing to improve ADL's      3.  Pt AROM will improve to lacking <10 to 115 knee flexion to allow pt to go up and down stairs in reciprocal fashion for improved home access. Eval:lacking 24-99  Current:  AROM      4.  Pt FOTO score will increase by >20 points to show improvement in right knee function.   Eval:30  Current: 34/100 no new progress    PLAN  [x]  Upgrade activities as tolerated     [x]  Continue plan of care  []  Update interventions per flow sheet       []  Discharge due to:_  []  Other:_      Christophe Camacho PTA 9/14/2017  10:25 AM    Future Appointments  Date Time Provider Caprice Leal   9/14/2017 10:30 AM LIO Villela [facility]   9/15/2017 3:00 PM LIO Villela [facility]

## 2017-09-15 ENCOUNTER — HOSPITAL ENCOUNTER (OUTPATIENT)
Dept: PHYSICAL THERAPY | Age: 77
Discharge: HOME | End: 2017-09-15
Payer: MEDICARE

## 2017-09-15 PROCEDURE — 97110 THERAPEUTIC EXERCISES: CPT

## 2017-09-15 PROCEDURE — 97016 VASOPNEUMATIC DEVICE THERAPY: CPT

## 2017-09-15 NOTE — PROGRESS NOTES
PT DAILY TREATMENT NOTE - [facility]     Patient Name: Jose Sullivan  Date:9/15/2017  : 1940  [x]  Patient  Verified  Payor: Melania Pichardo / Plan: [place] A & [place] / Product Type: Medicare /    In time:1500  Out time:1544  Total Treatment Time (min): 44  Total Timed Codes (min): 34  1:1 Treatment Time ( only): 34   Visit #: 12 of 16    Treatment Area: Right knee pain [M25.561]    SUBJECTIVE  Pain Level (0-10 scale): 0/10  Any medication changes, allergies to medications, adverse drug reactions, diagnosis change, or new procedure performed?: [x] No    [] Yes (see summary sheet for update)  Subjective functional status/changes:   [x] No changes reported      OBJECTIVE    Modality rationale: decrease pain and promote end range extension to improve the patients ability to normalize gait   Min Type Additional Details    [] Estim:  []Unatt       []IFC  []Premod                        []Other:  []w/ice   []w/heat  Position:  Location:    [] Estim: []Att    []TENS instruct  []NMES                    []Other:  []w/US   []w/ice   []w/heat  Position:  Location:    []  Traction: [] Cervical       []Lumbar                       [] Prone          []Supine                       []Intermittent   []Continuous Lbs:  [] before manual  [] after manual    []  Ultrasound: []Continuous   [] Pulsed                           []1MHz   []3MHz W/cm2:  Location:    []  Iontophoresis with dexamethasone         Location: [] Take home patch   [] In clinic    []  Ice     []  heat  []  Ice massage  []  Laser   []  Anodyne Position:  Location:    []  Laser with stim  []  Other:  Position:  Location:   10 [x]  Vasopneumatic Device right knee with heel on wedge Pressure:       [] lo [x] med [] hi   Temperature: [x] lo [] med [] hi   [x] Skin assessment post-treatment:  [x]intact []redness- no adverse reaction    []redness  adverse reaction:      min []Eval                  []Re-Eval       34 min Therapeutic Exercise:  [x] See flow sheet :   Rationale: increase ROM, increase strength, improve coordination, improve balance and increase proprioception to improve the patients ability to improve gait and ADL's     min Therapeutic Activity:  []  See flow sheet :         min Neuromuscular Re-education:  []  See flow sheet :        min Manual Therapy:          min Gait Training:  ___ feet with ___ device on level surfaces with ___ level of assist   Rationale: With   [] TE   [] TA   [] neuro   [] other: Patient Education: [x] Review HEP    [] Progressed/Changed HEP based on:   [] positioning   [] body mechanics   [] transfers   [] heat/ice application    [] other:      Other Objective/Functional Measures:  Right knee AROM/PROM: 16/13 - 110/112     Pain Level (0-10 scale) post treatment: 0/10    ASSESSMENT/Changes in Function: No new changes. Patient will continue to benefit from skilled PT services to modify and progress therapeutic interventions, address ROM deficits, address strength deficits, analyze and address soft tissue restrictions, analyze and cue movement patterns, analyze and modify body mechanics/ergonomics, assess and modify postural abnormalities, address imbalance/dizziness and instruct in home and community integration to attain remaining goals. []  See Plan of Care  [x]  See progress note/recertification  []  See Discharge Summary         Progress towards goals / Updated goals:  Short Term Goals: STG- To be accomplished in 4 week(s):  1.  Pt will be independent with HEP to encourage prophylaxis. Eval:dispensed  Current:reviewed with pt compliant using dynasplint at home and progressing time for static stretch.      2.  Pt PROM extension will improve to lacking <10 deg to allow pt to ambulate with more normalized gait pattern and least restrictive AD.   Eval:lacking 20, RW and lacking ext in stance  Current: 13 degrees PROM, with poor quad activation: no change since last tested with springy end feel      Long Term Goals: LTG- To be accomplished in 8 week(s):  1.  Pt will improve quad contraction to strong to allow pt to ambulate with normalized gait pattern for improved community navigation. Eval:poor  Status at PN: progressing with quad sets and TKE in standing  Current:   pt continues to use RW with limited extension on right  2.  Pt will be able to stand for ADLs and showers to improve functional mobility. Eval:unable to stand for showers or ADLs  Current: static stance progressing to improve ADL's      3.  Pt AROM will improve to lacking <10 to 115 knee flexion to allow pt to go up and down stairs in reciprocal fashion for improved home access. Eval:lacking 24-99  Current: 16 - 110 AROM      4.  Pt FOTO score will increase by >20 points to show improvement in right knee function. Eval:30  Current: 34/100 no new progress       PLAN  []  Upgrade activities as tolerated     [x]  Continue plan of care  []  Update interventions per flow sheet       []  Discharge due to:_  []  Other:_      Eleni Ch, PTA 9/15/2017  3:29 PM    No future appointments.

## 2017-09-18 ENCOUNTER — HOSPITAL ENCOUNTER (OUTPATIENT)
Dept: PHYSICAL THERAPY | Age: 77
Discharge: HOME | End: 2017-09-18
Payer: MEDICARE

## 2017-09-18 PROCEDURE — 97110 THERAPEUTIC EXERCISES: CPT

## 2017-09-18 PROCEDURE — 97016 VASOPNEUMATIC DEVICE THERAPY: CPT

## 2017-09-18 PROCEDURE — 97140 MANUAL THERAPY 1/> REGIONS: CPT

## 2017-09-18 NOTE — PROGRESS NOTES
PT DAILY TREATMENT NOTE - [facility]     Patient Name: Mallory Chu  Date:2017  : 1940  [x]  Patient  Verified  Payor: Mindy Plain / Plan: [place] MEDICARE PART A & B / Product Type: Medicare /    In time:430  Out time:526  Total Treatment Time (min): 56  Total Timed Codes (min): 46  1:1 Treatment Time ( [address] only): 40   Visit #: 13 of 16    Treatment Area: Right knee pain [M25.561]    SUBJECTIVE  Pain Level (0-10 scale): 0  Any medication changes, allergies to medications, adverse drug reactions, diagnosis change, or new procedure performed?: [x] No    [] Yes (see summary sheet for update)  Subjective functional status/changes:   [] No changes reported  \"I do it twice a day. \"    OBJECTIVE    Modality rationale: decrease inflammation and decrease pain to improve the patients ability to perform ADL's with reduced pain levels.     Min Type Additional Details    [] Estim:  []Unatt       []IFC  []Premod                        []Other:  []w/ice   []w/heat  Position:  Location:    [] Estim: []Att    []TENS instruct  []NMES                    []Other:  []w/US   []w/ice   []w/heat  Position:  Location:    []  Traction: [] Cervical       []Lumbar                       [] Prone          []Supine                       []Intermittent   []Continuous Lbs:  [] before manual  [] after manual    []  Ultrasound: []Continuous   [] Pulsed                           []1MHz   []3MHz W/cm2:  Location:    []  Iontophoresis with dexamethasone         Location: [] Take home patch   [] In clinic    []  Ice     []  heat  []  Ice massage  []  Laser   []  Anodyne Position:  Location:    []  Laser with stim  []  Other:  Position:  Location:   10 [x]  Vasopneumatic Device Pressure:       [] lo [] med [] hi   Temperature: [] lo [] med [] hi   [x] Skin assessment post-treatment:  [x]intact []redness- no adverse reaction    []redness  adverse reaction:       36 min Therapeutic Exercise:  [x] See flow sheet :   Rationale: increase ROM, increase strength, improve coordination, improve balance and increase proprioception to improve the patients ability to perform ADL's with reduced pain levels. 10 min Manual Therapy:  Manual HS stretch, patellar mobs in all directions, knee ext AP mobs with OP, seated contract rleax   Rationale: decrease pain, increase ROM, increase tissue extensibility and increase postural awareness to perform ADL's with reduced pain levels. With   [] TE   [] TA   [] neuro   [] other: Patient Education: [x] Review HEP    [] Progressed/Changed HEP based on:   [] positioning   [] body mechanics   [] transfers   [] heat/ice application    [] other:      Other Objective/Functional Measures:      Pain Level (0-10 scale) post treatment: 0    ASSESSMENT/Changes in Function: Pt tolerated manual fairly well. Very limited right hip and knee and ankle flexion with swing, leading to dragging of toe with gait. Patellar mobility added to address ROM restrictions. Patient will continue to benefit from skilled PT services to modify and progress therapeutic interventions, address functional mobility deficits, address ROM deficits, address strength deficits, analyze and address soft tissue restrictions, analyze and cue movement patterns, analyze and modify body mechanics/ergonomics, assess and modify postural abnormalities, address imbalance/dizziness and instruct in home and community integration to attain remaining goals. []  See Plan of Care  []  See progress note/recertification  []  See Discharge Summary         Progress towards goals / Updated goals:  Short Term Goals: STG- To be accomplished in 4 week(s):  1.  Pt will be independent with HEP to encourage prophylaxis.   Eval:dispensed  Current:reviewed with pt compliant using dynasplint at home and progressing time for static stretch.      2.  Pt PROM extension will improve to lacking <10 deg to allow pt to ambulate with more normalized gait pattern and least restrictive AD.  Eval:lacking 20, RW and lacking ext in stance  Current: 13 degrees PROM, with poor quad activation: no change since last tested with springy end feel      Long Term Goals: LTG- To be accomplished in 8 week(s):  1.  Pt will improve quad contraction to strong to allow pt to ambulate with normalized gait pattern for improved community navigation. Eval:poor  Status at PN: progressing with quad sets and TKE in standing  Current:   pt continues to use RW with limited extension on right  2.  Pt will be able to stand for ADLs and showers to improve functional mobility. Eval:unable to stand for showers or ADLs  Current: static stance progressing to improve ADL's      3.  Pt AROM will improve to lacking <10 to 115 knee flexion to allow pt to go up and down stairs in reciprocal fashion for improved home access. Eval:lacking 24-99  Current: 16 - 110 AROM      4.  Pt FOTO score will increase by >20 points to show improvement in right knee function.   Eval:30  Current: 34/100 no new progress       PLAN  []  Upgrade activities as tolerated     [x]  Continue plan of care  []  Update interventions per flow sheet       []  Discharge due to:_  []  Other:_      Stoney Coleman, PT 9/18/2017  4:30 PM    Future Appointments  Date Time Provider Caprice Leal   9/19/2017 3:00 PM Suzi Ramírez, PT [facility]

## 2017-09-19 ENCOUNTER — HOSPITAL ENCOUNTER (OUTPATIENT)
Dept: PHYSICAL THERAPY | Age: 77
Discharge: HOME | End: 2017-09-19
Payer: MEDICARE

## 2017-09-19 PROCEDURE — 97110 THERAPEUTIC EXERCISES: CPT

## 2017-09-19 NOTE — PROGRESS NOTES
PT DAILY TREATMENT NOTE - [facility]     Patient Name: Alexsandra Holder  VMNI:  : 1940  [x]  Patient  Verified  Payor: [place] MED[place] / Plan: [place] MEDICARE PART A & B / Product Type: Medicare /    In time:245  Out time:325  Total Treatment Time (min): 40  Total Timed Codes (min): 40  1:1 Treatment Time ( [address] only): 40   Visit #: 14 of 16    Treatment Area: Right knee pain [M25.561]    SUBJECTIVE  Pain Level (0-10 scale): 0/10  Any medication changes, allergies to medications, adverse drug reactions, diagnosis change, or new procedure performed?: [x] No    [] Yes (see summary sheet for update)  Subjective functional status/changes:   [x] No changes reported      OBJECTIVE    Modality rationale:    Min Type Additional Details    [] Estim:  []Unatt       []IFC  []Premod                        []Other:  []w/ice   []w/heat  Position:  Location:    [] Estim: []Att    []TENS instruct  []NMES                    []Other:  []w/US   []w/ice   []w/heat  Position:  Location:    []  Traction: [] Cervical       []Lumbar                       [] Prone          []Supine                       []Intermittent   []Continuous Lbs:  [] before manual  [] after manual    []  Ultrasound: []Continuous   [] Pulsed                           []1MHz   []3MHz W/cm2:  Location:    []  Iontophoresis with dexamethasone         Location: [] Take home patch   [] In clinic    []  Ice     []  heat  []  Ice massage  []  Laser   []  Anodyne Position:  Location:    []  Laser with stim  []  Other:  Position:  Location:    []  Vasopneumatic Device Pressure:       [] lo [] med [] hi   Temperature: [] lo [] med [] hi   [] Skin assessment post-treatment:  []intact []redness- no adverse reaction    []redness  adverse reaction:      min []Eval                  []Re-Eval       40 min Therapeutic Exercise:  [x] See flow sheet :   Rationale: increase ROM, increase strength and increase proprioception to improve the patients ability to normalize gait and function     min Therapeutic Activity:  []  See flow sheet :         min Neuromuscular Re-education:  []  See flow sheet :        min Manual Therapy:         min Gait Training:  ___ feet with ___ device on level surfaces with ___ level of assist   Rationale: With   [] TE   [] TA   [] neuro   [] other: Patient Education: [x] Review HEP    [] Progressed/Changed HEP based on:   [] positioning   [] body mechanics   [] transfers   [] heat/ice application    [] other:      Other Objective/Functional Measures: none taken today     Pain Level (0-10 scale) post treatment: 0/10    ASSESSMENT/Changes in Function: No new progress. Gait abnormalities persist, mildly improved with cueing. Patient will continue to benefit from skilled PT services to modify and progress therapeutic interventions to attain remaining goals. []  See Plan of Care  []  See progress note/recertification  []  See Discharge Summary         Progress towards goals / Updated goals:  Short Term Goals: STG- To be accomplished in 4 week(s):  1.  Pt will be independent with HEP to encourage prophylaxis. Eval:dispensed  Current:reviewed with pt compliant using dynasplint at home and progressing time for static stretch.      2.  Pt PROM extension will improve to lacking <10 deg to allow pt to ambulate with more normalized gait pattern and least restrictive AD. Eval:lacking 20, RW and lacking ext in stance  Current: 13 degrees PROM, with poor quad activation: no change since last tested with springy end feel      Long Term Goals: LTG- To be accomplished in 8 week(s):  1.  Pt will improve quad contraction to strong to allow pt to ambulate with normalized gait pattern for improved community navigation. Eval:poor  Status at PN: progressing with quad sets and TKE in standing  Current:   pt continues to use RW with limited extension on right  2.  Pt will be able to stand for ADLs and showers to improve functional mobility.   Eval:unable to stand for showers or ADLs  Current: static stance progressing to improve ADL's      3.  Pt AROM will improve to lacking <10 to 115 knee flexion to allow pt to go up and down stairs in reciprocal fashion for improved home access. Eval:lacking 24-99  Current: 16 - 110 AROM      4.  Pt FOTO score will increase by >20 points to show improvement in right knee function.   Eval:30  Current: 34/100 no new progress       PLAN  []  Upgrade activities as tolerated     []  Continue plan of care  []  Update interventions per flow sheet       []  Discharge due to:_  [x]  Other: D/C next visit to [facility] secondary to lack of progress      Harlan Adams PT 9/19/2017  2:48 PM    Future Appointments  Date Time Provider Caprice Leal   9/19/2017 3:00 PM Arsenio Jimenez [facility]   9/21/2017 10:00 AM Rina Serrano, [facility]

## 2017-09-20 ENCOUNTER — APPOINTMENT (OUTPATIENT)
Dept: PHYSICAL THERAPY | Age: 77
End: 2017-09-20
Payer: MEDICARE

## 2017-09-21 ENCOUNTER — HOSPITAL ENCOUNTER (OUTPATIENT)
Dept: PHYSICAL THERAPY | Age: 77
Discharge: HOME | End: 2017-09-21
Payer: MEDICARE

## 2017-09-21 PROCEDURE — G8979 MOBILITY GOAL STATUS: HCPCS

## 2017-09-21 PROCEDURE — 97110 THERAPEUTIC EXERCISES: CPT

## 2017-09-21 PROCEDURE — 97530 THERAPEUTIC ACTIVITIES: CPT

## 2017-09-21 PROCEDURE — G8980 MOBILITY D/C STATUS: HCPCS

## 2017-09-21 NOTE — PROGRESS NOTES
PT DISCHARGE DAILY NOTE AND [id number]    Date:2017  Patient name: Samia Hood Start of Care: 17   Referral source: Perry Pelletier MD : [id number]   Medical/Treatment Diagnosis: Right knee pain [M25.561] Onset Date:17     Prior Hospitalization: see medical history Provider#: [id number]   Medications: Verified on Patient Summary List    Comorbidities: HTN, mini-stroke history   Prior Level of Function: standing for ADLs and showers, RW to ambulate prior to TKR (since 2017 due to neck surgery), pain with everyday activities    Visits from Start of Care: 15    Missed Visits: 0    Reporting Period : 17 to 17    [x]  Patient  Verified  Payor: [place] MEDICARE / Plan: [place] MEDICARE PART A & B / Product Type: Medicare /    In time:1005  Out time:1049  Total Treatment Time (min): 44  Total Timed Codes (min): 44  1:1 Treatment Time ( [address] only): 45   Visit #: 15 of 16    SUBJECTIVE  Pain Level (0-10 scale): 0  Any medication changes, allergies to medications, adverse drug reactions, diagnosis change, or new procedure performed?: [x] No    [] Yes (see summary sheet for update)  Subjective functional status/changes:   [] No changes reported  \"I'm doing it at home (foot prop and dynasplint use). \"    OBJCTIVE    30 min Therapeutic Exercise:  [x] See flow sheet :   Rationale: increase ROM, increase strength and improve coordination to improve the patients ability to perform ADL's with reduced pain levels. 14 min Therapeutic Activity:  [x]  See flow sheet : FOTO goal update, d/c HEP review and instructions   Rationale: increase ROM, increase strength, improve coordination, improve balance and increase proprioception  to improve the patients ability to perform ADL's with reduced pain levels.              With   [] TE   [] TA   [] neuro   [] other: Patient Education: [x] Review HEP    [] Progressed/Changed HEP based on:   [] positioning   [] body mechanics   [] transfers   [] heat/ice application [] other:      Other Objective/Functional Measures: continues with RW and foot drag when not cued to lift right hip     Pain Level (0-10 scale) post treatment: 0    Summary of Care:  Short Term Goals: STG- To be accomplished in 4 week(s):  1.  Pt will be independent with HEP to encourage prophylaxis. Eval:dispensed  Current:reviewed with pt compliant using dynasplint at home and progressing time for static stretch.      2.  Pt PROM extension will improve to lacking <10 deg to allow pt to ambulate with more normalized gait pattern and least restrictive AD. Eval:lacking 20, RW and lacking ext in stance  Current: spring end feel 10-- lacking-not met      Long Term Goals: LTG- To be accomplished in 8 week(s):  1.  Pt will improve quad contraction to strong to allow pt to ambulate with normalized gait pattern for improved community navigation. Eval:poor  Status at PN: progressing with quad sets and TKE in standing  Current:   good contraction intermittently, mostly fair- but remains lacking full extension needed for normalized gait- progressing but not met     2.  Pt will be able to stand for ADLs and showers to improve functional mobility. Eval:unable to stand for showers or ADLs  Current: reports standing for ADLs, sitting for showers-- progressing but not met      3.  Pt AROM will improve to lacking <10 to 115 knee flexion to allow pt to go up and down stairs in reciprocal fashion for improved home access. Eval:lacking 24-99  Current: lacking 16 - 110 AROM- not met      4.  Pt FOTO score will increase by >20 points to show improvement in right knee function. Eval:30  Last PN: 34/100 no new progress  Current: 70-- mET       ASSESSMENT/Changes in Function: Continues to lack adequate knee extension ROM for normalized gait. Continues with decreased quad control. Pt demo ability to perform HEP at home. Reviewed importance of HEP with pt and spouse- both verbalize understanding.   Due to lack pf progress, pt to be discharged at this time. G-Codes (GP)  Mobility   [id number] Goal  CJ= 20-39%  D/C  CJ= 20-39%      The severity rating is based on clinical judgment and the FOTO score.       Thank you for this referral!     PLAN  [x]Discontinue therapy: []Patient has reached or is progressing toward set goals      []Patient is non-compliant or has abdicated      [x]Due to lack of appreciable progress towards set goals    Devin Rodas PT 9/21/2017  10:05 AM

## 2018-06-06 ENCOUNTER — APPOINTMENT (OUTPATIENT)
Dept: CT IMAGING | Age: 78
End: 2018-06-06
Attending: INTERNAL MEDICINE
Payer: MEDICARE

## 2018-06-06 ENCOUNTER — HOSPITAL ENCOUNTER (EMERGENCY)
Age: 78
Discharge: HOME | End: 2018-06-06
Attending: INTERNAL MEDICINE | Admitting: INTERNAL MEDICINE
Payer: MEDICARE

## 2018-06-06 VITALS
OXYGEN SATURATION: 96 % | HEIGHT: 66 IN | DIASTOLIC BLOOD PRESSURE: 84 MMHG | BODY MASS INDEX: 27.32 KG/M2 | TEMPERATURE: 98.6 F | WEIGHT: 170 LBS | SYSTOLIC BLOOD PRESSURE: 156 MMHG | RESPIRATION RATE: 16 BRPM | HEART RATE: 99 BPM

## 2018-06-06 DIAGNOSIS — N39.0 URINARY TRACT INFECTION WITHOUT HEMATURIA, SITE UNSPECIFIED: ICD-10-CM

## 2018-06-06 DIAGNOSIS — K57.92 DIVERTICULITIS: ICD-10-CM

## 2018-06-06 DIAGNOSIS — R10.32 ABDOMINAL PAIN, LLQ (LEFT LOWER QUADRANT): Primary | ICD-10-CM

## 2018-06-06 LAB
ALBUMIN SERPL-MCNC: 3.4 G/DL (ref 3.4–5)
ALBUMIN/GLOB SERPL: 0.8 {RATIO} (ref 0.8–1.7)
ALP SERPL-CCNC: 146 U/L (ref 45–117)
ALT SERPL-CCNC: 46 U/L (ref 16–61)
ANION GAP SERPL CALC-SCNC: 7 MMOL/L (ref 3–18)
APPEARANCE UR: ABNORMAL
AST SERPL-CCNC: 34 U/L (ref 15–37)
BACTERIA URNS QL MICRO: ABNORMAL /HPF
BASOPHILS # BLD: 0 K/UL (ref 0–0.06)
BASOPHILS NFR BLD: 0 % (ref 0–2)
BILIRUB SERPL-MCNC: 0.8 MG/DL (ref 0.2–1)
BILIRUB UR QL: NEGATIVE
BUN SERPL-MCNC: 9 MG/DL (ref 7–18)
BUN/CREAT SERPL: 8 (ref 12–20)
CALCIUM SERPL-MCNC: 8.6 MG/DL (ref 8.5–10.1)
CHLORIDE SERPL-SCNC: 106 MMOL/L (ref 100–108)
CO2 SERPL-SCNC: 30 MMOL/L (ref 21–32)
COLOR UR: YELLOW
CREAT SERPL-MCNC: 1.15 MG/DL (ref 0.6–1.3)
DIFFERENTIAL METHOD BLD: ABNORMAL
EOSINOPHIL # BLD: 0.1 K/UL (ref 0–0.4)
EOSINOPHIL NFR BLD: 1 % (ref 0–5)
EPITH CASTS URNS QL MICRO: ABNORMAL /LPF (ref 0–5)
ERYTHROCYTE [DISTWIDTH] IN BLOOD BY AUTOMATED COUNT: 13.8 % (ref 11.6–14.5)
GLOBULIN SER CALC-MCNC: 4.1 G/DL (ref 2–4)
GLUCOSE SERPL-MCNC: 107 MG/DL (ref 74–99)
GLUCOSE UR STRIP.AUTO-MCNC: NEGATIVE MG/DL
HCT VFR BLD AUTO: 45.7 % (ref 36–48)
HGB BLD-MCNC: 14.8 G/DL (ref 13–16)
HGB UR QL STRIP: NEGATIVE
KETONES UR QL STRIP.AUTO: NEGATIVE MG/DL
LEUKOCYTE ESTERASE UR QL STRIP.AUTO: ABNORMAL
LIPASE SERPL-CCNC: 54 U/L (ref 73–393)
LYMPHOCYTES # BLD: 1.2 K/UL (ref 0.9–3.6)
LYMPHOCYTES NFR BLD: 15 % (ref 21–52)
MCH RBC QN AUTO: 29.7 PG (ref 24–34)
MCHC RBC AUTO-ENTMCNC: 32.4 G/DL (ref 31–37)
MCV RBC AUTO: 91.8 FL (ref 74–97)
MONOCYTES # BLD: 1 K/UL (ref 0.05–1.2)
MONOCYTES NFR BLD: 12 % (ref 3–10)
NEUTS SEG # BLD: 6.1 K/UL (ref 1.8–8)
NEUTS SEG NFR BLD: 72 % (ref 40–73)
NITRITE UR QL STRIP.AUTO: NEGATIVE
PH UR STRIP: 8 [PH] (ref 5–8)
PLATELET # BLD AUTO: 150 K/UL (ref 135–420)
PMV BLD AUTO: 9.4 FL (ref 9.2–11.8)
POTASSIUM SERPL-SCNC: 4 MMOL/L (ref 3.5–5.5)
PROT SERPL-MCNC: 7.5 G/DL (ref 6.4–8.2)
PROT UR STRIP-MCNC: ABNORMAL MG/DL
RBC # BLD AUTO: 4.98 M/UL (ref 4.7–5.5)
RBC #/AREA URNS HPF: ABNORMAL /HPF (ref 0–5)
SODIUM SERPL-SCNC: 143 MMOL/L (ref 136–145)
SP GR UR REFRACTOMETRY: 1.02 (ref 1–1.03)
UROBILINOGEN UR QL STRIP.AUTO: 1 EU/DL (ref 0.2–1)
WBC # BLD AUTO: 8.4 K/UL (ref 4.6–13.2)
WBC URNS QL MICRO: ABNORMAL /HPF (ref 0–5)

## 2018-06-06 PROCEDURE — 80053 COMPREHEN METABOLIC PANEL: CPT | Performed by: INTERNAL MEDICINE

## 2018-06-06 PROCEDURE — 96360 HYDRATION IV INFUSION INIT: CPT

## 2018-06-06 PROCEDURE — 81001 URINALYSIS AUTO W/SCOPE: CPT | Performed by: INTERNAL MEDICINE

## 2018-06-06 PROCEDURE — 74011250637 HC RX REV CODE- 250/637: Performed by: INTERNAL MEDICINE

## 2018-06-06 PROCEDURE — 99283 EMERGENCY DEPT VISIT LOW MDM: CPT

## 2018-06-06 PROCEDURE — 74011250636 HC RX REV CODE- 250/636: Performed by: INTERNAL MEDICINE

## 2018-06-06 PROCEDURE — 83690 ASSAY OF LIPASE: CPT | Performed by: INTERNAL MEDICINE

## 2018-06-06 PROCEDURE — 74011636320 HC RX REV CODE- 636/320: Performed by: INTERNAL MEDICINE

## 2018-06-06 PROCEDURE — 85025 COMPLETE CBC W/AUTO DIFF WBC: CPT | Performed by: INTERNAL MEDICINE

## 2018-06-06 PROCEDURE — 74177 CT ABD & PELVIS W/CONTRAST: CPT

## 2018-06-06 PROCEDURE — 96361 HYDRATE IV INFUSION ADD-ON: CPT

## 2018-06-06 PROCEDURE — 87086 URINE CULTURE/COLONY COUNT: CPT | Performed by: INTERNAL MEDICINE

## 2018-06-06 RX ORDER — ACETAMINOPHEN AND CODEINE PHOSPHATE 300; 30 MG/1; MG/1
1 TABLET ORAL
Qty: 12 TAB | Refills: 0 | Status: SHIPPED | OUTPATIENT
Start: 2018-06-06

## 2018-06-06 RX ORDER — METRONIDAZOLE 500 MG/1
500 TABLET ORAL 2 TIMES DAILY
Qty: 20 TAB | Refills: 0 | Status: SHIPPED | OUTPATIENT
Start: 2018-06-06 | End: 2018-06-16

## 2018-06-06 RX ORDER — CIPROFLOXACIN 500 MG/1
500 TABLET ORAL
Status: COMPLETED | OUTPATIENT
Start: 2018-06-06 | End: 2018-06-06

## 2018-06-06 RX ORDER — CIPROFLOXACIN 500 MG/1
500 TABLET ORAL 2 TIMES DAILY
Qty: 20 TAB | Refills: 0 | Status: SHIPPED | OUTPATIENT
Start: 2018-06-06 | End: 2018-06-16

## 2018-06-06 RX ADMIN — SODIUM CHLORIDE 1000 ML: 900 INJECTION, SOLUTION INTRAVENOUS at 08:26

## 2018-06-06 RX ADMIN — IOPAMIDOL 100 ML: 612 INJECTION, SOLUTION INTRAVENOUS at 09:18

## 2018-06-06 RX ADMIN — CIPROFLOXACIN HYDROCHLORIDE 500 MG: 500 TABLET, FILM COATED ORAL at 11:34

## 2018-06-06 NOTE — ED TRIAGE NOTES
Pt c/o LLQ pain , onset yest, denies v/d or injury  pt states pain with deep breath and movement, family reports increase urination and urine being dark,   Sepsis Screening completed    (  )Patient meets SIRS criteria. ( x )Patient does not meet SIRS criteria.       SIRS Criteria is achieved when two or more of the following are present   Temperature < 96.8°F (36°C) or > 100.9°F (38.3°C)   Heart Rate > 90 beats per minute   Respiratory Rate > 20 breaths per minute   WBC count > 12,000 or <4,000 or > 10% bands
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
## 2018-06-08 LAB
BACTERIA SPEC CULT: NORMAL
SERVICE CMNT-IMP: NORMAL

## 2019-02-20 ENCOUNTER — HOSPITAL ENCOUNTER (OUTPATIENT)
Dept: LAB | Age: 79
Discharge: HOME | End: 2019-02-20
Payer: MEDICARE

## 2019-02-20 LAB
TSH SERPL DL<=0.05 MIU/L-ACNC: 3.8 UIU/ML (ref 0.36–3.74)
VIT B12 SERPL-MCNC: 443 PG/ML (ref 211–911)

## 2019-02-20 PROCEDURE — 36415 COLL VENOUS BLD VENIPUNCTURE: CPT

## 2019-02-20 PROCEDURE — 84443 ASSAY THYROID STIM HORMONE: CPT

## 2019-02-20 PROCEDURE — 82607 VITAMIN B-12: CPT

## 2019-02-24 LAB
FAX TO INFO,FAXT: NORMAL
FAX TO NUMBER,FAXN: NORMAL

## 2019-05-14 ENCOUNTER — HOSPITAL ENCOUNTER (OUTPATIENT)
Dept: PHYSICAL THERAPY | Age: 79
Discharge: HOME | End: 2019-05-14
Payer: MEDICARE

## 2019-05-14 PROCEDURE — 97161 PT EVAL LOW COMPLEX 20 MIN: CPT

## 2019-05-14 PROCEDURE — 97110 THERAPEUTIC EXERCISES: CPT
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
## 2019-05-16 ENCOUNTER — HOSPITAL ENCOUNTER (OUTPATIENT)
Dept: PHYSICAL THERAPY | Age: 79
Discharge: HOME | End: 2019-05-16
Payer: MEDICARE

## 2019-05-16 PROCEDURE — 97110 THERAPEUTIC EXERCISES: CPT

## 2019-05-22 ENCOUNTER — HOSPITAL ENCOUNTER (OUTPATIENT)
Dept: PHYSICAL THERAPY | Age: 79
Discharge: HOME | End: 2019-05-22
Payer: MEDICARE

## 2019-05-22 PROCEDURE — 97110 THERAPEUTIC EXERCISES: CPT

## 2019-05-22 NOTE — PROGRESS NOTES
PT DAILY TREATMENT NOTE - [facility]     Patient Name: Mer Kauffman  Date:2019  : 1940  [x]  Patient  Verified  Payor: [place] MEDICARE / Plan: [place] MEDICARE PART A & B / Product Type: Medicare /    In time:1130  Out time:1200  Total Treatment Time (min): 30  Total Timed Codes (min): 30   1:1 Treatment Time ( only): 30   Visit #: 3 of 24    Treatment Area: Low back pain [M54.5]    SUBJECTIVE  Pain Level (0-10 scale): 0  Any medication changes, allergies to medications, adverse drug reactions, diagnosis change, or new procedure performed?: [x] No    [] Yes (see summary sheet for update)  Subjective functional status/changes:   [] No changes reported    Patient reports that he is feeling slower today. He states overall he has no pain just feels more tired than usual.     OBJECTIVE      30 min Therapeutic Exercise:  [x] See flow sheet :   Rationale: increase ROM, increase strength and decrease pain to improve the patients ability to complete ADLs          With   [x] TE   [] TA   [] neuro   [] other: Patient Education: [x] Review HEP    [] Progressed/Changed HEP based on:   [] positioning   [] body mechanics   [] transfers   [] heat/ice application    [] other:      Other Objective/Functional Measures:      Pain Level (0-10 scale) post treatment: 0    ASSESSMENT/Changes in Function: Patient responds well to treatment session. Patient required less encouragement to participate in exercise activity today. Provided cues to pace himself during exercise.   No adverse effects were noted from today's treatment session    Patient will continue to benefit from skilled PT services to modify and progress therapeutic interventions, address functional mobility deficits, address ROM deficits, address strength deficits, analyze and address soft tissue restrictions, analyze and cue movement patterns, analyze and modify body mechanics/ergonomics, assess and modify postural abnormalities, address imbalance and instruct in home and community integration to attain remaining goals. []  See Plan of Care  []  See progress note/recertification  []  See Discharge Summary         Progress towards goals / Updated goals:  Short Term Goals: To be accomplished in 2 weeks:                 DQBMFCP will report compliance with HEP 1x/day to aid in rehabilitation program.                 Status at IE: NA                 Current:Initiated HEP 05/16/2019.        Long Term Goals: To be accomplished in 4 weeks:                 FYGEPAN will increase B LE strength to 4/5 MMT throughout to aid in completion of ADLs.                Status at IE:3+/5                 Current: Same as IE                    EHVJXOC will report pain no greater than 0-2/10 throughout entire day to aid in completion of ADLs.                Status at IE:0-8                 Current:Same as IE                    Patent will be able to stand for 15 mins to be able to complete ADLs.                Status at IE:5 minutes                 Current:Same as IE                    Patient will improve FOTO score to 65 points to demonstrate improvement in functional status.                  Status at IE:11                 AFJJDBE: Same as IE    PLAN  []  Upgrade activities as tolerated     [x]  Continue plan of care  []  Update interventions per flow sheet       []  Discharge due to:_  []  Other:_      Rosanna Batista PT, DPT 5/22/2019  11:30 AM    Future Appointments   Date Time Provider Caprice Leal   5/24/2019  3:00 PM Thalia Diaz PT, DPT [facility]   5/28/2019  2:00 PM Luis Tineo PT [facility]   6/5/2019  1:30 PM Luis Tineo PT [facility]   6/7/2019 11:30 AM Thalia Diaz PT, DPT [facility]   6/10/2019 11:00 AM [facility]ADOR [facility]   6/12/2019 11:00 AM Luis Tineo PT [facility]   6/17/2019 11:00 AM Luis Tineo PT [facility]   6/19/2019 11:00 AM JulAMADOR Brown[facility]   6/24/2019 11:00 AM AMADOR Rojo [facility] 6/26/2019 11:00 AM Radha Milian PT [facility]

## 2019-05-24 ENCOUNTER — HOSPITAL ENCOUNTER (OUTPATIENT)
Dept: PHYSICAL THERAPY | Age: 79
Discharge: HOME | End: 2019-05-24
Payer: MEDICARE

## 2019-05-24 PROCEDURE — 97110 THERAPEUTIC EXERCISES: CPT | Performed by: PHYSICAL THERAPIST

## 2019-05-28 ENCOUNTER — HOSPITAL ENCOUNTER (OUTPATIENT)
Dept: PHYSICAL THERAPY | Age: 79
Discharge: HOME | End: 2019-05-28
Payer: MEDICARE

## 2019-05-28 PROCEDURE — 97110 THERAPEUTIC EXERCISES: CPT

## 2019-05-28 NOTE — PROGRESS NOTES
PT DAILY TREATMENT NOTE - [facility]     Patient Name: Dago Dean  Date:2019  : 1940  [x]  Patient  Verified  Payor: [place] MEDICARE / Plan: VA MEDICARE PART A & B / Product Type: Medicare /    In time:200  Out time:230  Total Treatment Time (min): 30  Total Timed Codes (min): 30   1:1 Treatment Time ( [address] only): 30   Visit #: 5 of 24    Treatment Area: Low back pain [M54.5]    SUBJECTIVE  Pain Level (0-10 scale): 0  Any medication changes, allergies to medications, adverse drug reactions, diagnosis change, or new procedure performed?: [x] No    [] Yes (see summary sheet for update)  Subjective functional status/changes:   [] No changes reported  Patient reports feeling fatigue today. OBJECTIVE      30 min Therapeutic Exercise:  [x] See flow sheet :   Rationale: increase ROM, increase strength and decrease pain to improve the patients ability to complete ADLs            With   [x] TE   [] TA   [] neuro   [] other: Patient Education: [x] Review HEP    [] Progressed/Changed HEP based on:   [] positioning   [] body mechanics   [] transfers   [] heat/ice application    [] other:      Other Objective/Functional Measures:   Vitals    Heart Rate 99, 100, 100,99 bpm   Blood pressure 175/90, 148/89, 168/94, 149/89 mmHg         Pain Level (0-10 scale) post treatment: 0    ASSESSMENT/Changes in Function: Patient responded fairly to treatment as he was fatigued prior to treatment. Assessed vitals throughout treatment and patient remained within safe limits for exercise. Required several cues for activity pacing.  No adverse effects were noted from today's treatment session    Patient will continue to benefit from skilled PT services to modify and progress therapeutic interventions, address functional mobility deficits, address ROM deficits, address strength deficits, analyze and address soft tissue restrictions, analyze and cue movement patterns, analyze and modify body mechanics/ergonomics, assess and modify postural abnormalities, instruct in home and community integration to attain remaining goals. []  See Plan of Care  []  See progress note/recertification  []  See Discharge Summary         Progress towards goals / Updated goals:  Short Term Goals: To be accomplished in 2 weeks:                 YKDBECC will report compliance with HEP 1x/day to aid in rehabilitation program.                 Status at IE: NA                 Current:Initiated HEP 05/16/2019.        Long Term Goals: To be accomplished in 4 weeks:                 KILXZUW will increase B LE strength to 4/5 MMT throughout to aid in completion of ADLs.                Status at IE:3+/5                 Current: Same as IE                    WHLIJQO will report pain no greater than 0-2/10 throughout entire day to aid in completion of ADLs.                Status at IE:0-8                 Current:3/10 at least, progressing 5/24/2019                    Patent will be able to stand for 15 mins to be able to complete ADLs.                Status at IE:5 minutes                 NCTOLUZ:YLTOYWJNGOZI reports difficulty with weight bearing secondary to fatigue 05/28/2019                    Patient will improve FOTO score to 65 points to demonstrate improvement in functional status.                  Status at IE:11                 DTEGRTA: Same as IE  PLAN  []  Upgrade activities as tolerated     [x]  Continue plan of care  []  Update interventions per flow sheet       []  Discharge due to:_  []  Other:_      Saray Hatfield, PT, DPT 5/28/2019  2:22 PM    Future Appointments   Date Time Provider Caprice Leal   6/5/2019  1:30 PM AMADOR Rush [facility]   6/7/2019 11:30 AM Clementine Gipson, AMADOR, DPT [facility]   6/10/2019 11:00 AM AMADOR Rush [facility]   6/12/2019 11:00 AM AMADOR Rush [facility]   6/17/2019 11:00 AM Rylie Bravo PT [facility]   6/19/2019 11:00 AM Rylie Bravo, PT [facility]   6/24/2019 11:00 AM Rylie Bravo, AMADOR [facility]   6/26/2019 11:00 AM Artie Salas PT [facility]

## 2019-06-03 ENCOUNTER — APPOINTMENT (OUTPATIENT)
Dept: PHYSICAL THERAPY | Age: 79
End: 2019-06-03
Payer: MEDICARE

## 2019-06-05 ENCOUNTER — HOSPITAL ENCOUNTER (OUTPATIENT)
Dept: PHYSICAL THERAPY | Age: 79
Discharge: HOME | End: 2019-06-05
Payer: MEDICARE

## 2019-06-05 PROCEDURE — 97110 THERAPEUTIC EXERCISES: CPT

## 2019-06-05 NOTE — PROGRESS NOTES
PT DAILY TREATMENT NOTE - [facility]     Patient Name: Silver Barboza  Date:2019  : 1940  [x]  Patient  Verified  Payor: [place] MEDICARE / Plan: VA MEDICARE PART A & B / Product Type: Medicare /    In time:130  Out time:200  Total Treatment Time (min): 30  Total Timed Codes (min): 30   1:1 Treatment Time ( only): 30   Visit #: 6 of 24    Treatment Area: Low back pain [M54.5]    SUBJECTIVE  Pain Level (0-10 scale): 0  Any medication changes, allergies to medications, adverse drug reactions, diagnosis change, or new procedure performed?: [x] No    [] Yes (see summary sheet for update)  Subjective functional status/changes:   [] No changes reported  Patient reports compliance with HEP. OBJECTIVE    30 min Therapeutic Exercise:  [x] See flow sheet :   Rationale: increase ROM, increase strength and decrease pain to improve the patients ability to complete ADLs          With   [x] TE   [] TA   [] neuro   [] other: Patient Education: [x] Review HEP    [] Progressed/Changed HEP based on:   [] positioning   [] body mechanics   [] transfers   [] heat/ice application    [] other:      Other Objective/Functional Measures:      Pain Level (0-10 scale) post treatment: 0     ASSESSMENT/Changes in Function: Patient responds well to treatment session. Patient required fewer cues for activity pacing. Performed theraband activities seated. No adverse effects were noted from today's treatment session    Patient will continue to benefit from skilled PT services to modify and progress therapeutic interventions, address functional mobility deficits, address ROM deficits, address strength deficits, analyze and address soft tissue restrictions, analyze and cue movement patterns, analyze and modify body mechanics/ergonomics, assess and modify postural abnormalities, address imbalance/dizziness and instruct in home and community integration to attain remaining goals.       []  See Plan of Care  []  See progress note/recertification  []  See Discharge Summary         Progress towards goals / Updated goals:  Short Term Goals: To be accomplished in 2 weeks:                 RHRDHDZ will report compliance with HEP 1x/day to aid in rehabilitation program.                 Status at IE: NA                 Current:Met 06/05/2019.        Long Term Goals: To be accomplished in 4 weeks:                 TSOVQUN will increase B LE strength to 4/5 MMT throughout to aid in completion of ADLs.                Status at IE:3+/5                 Current: Same as IE                    JXCCTZY will report pain no greater than 0-2/10 throughout entire day to aid in completion of ADLs.                Status at IE:0-8                 Current:3/10 at least, progressing 5/24/2019                    Patent will be able to stand for 15 mins to be able to complete ADLs.                Status at IE:5 minutes                 CUIMJYX:NTKBPIQ reports difficulty with weight bearing secondary to fatigue 05/28/2019                    Patient will improve FOTO score to 65 points to demonstrate improvement in functional status.                  Status at IE:11                 JMODYZU: Same as IE  PLAN  []  Upgrade activities as tolerated     [x]  Continue plan of care  []  Update interventions per flow sheet       []  Discharge due to:_  []  Other:_      Barbra Blanco PT, DPT 6/5/2019  1:47 PM    Future Appointments   Date Time Provider Caprice Leal   6/7/2019 11:30 AM Vikas Castano PT, DPT [facility]   6/10/2019 11:00 AM Remigio Stage, PT [facility]   6/12/2019 11:00 AM Remigio Stage, PT [facility]   6/17/2019 11:00 AM Remigio Stage, PT [facility]   6/19/2019 11:00 AM Remigio Stage, PT [facility]   6/24/2019 11:00 AM Remigio Stage, PT [facility]   6/26/2019 11:00 AM Remigio Stage, PT [facility]

## 2019-06-07 ENCOUNTER — HOSPITAL ENCOUNTER (OUTPATIENT)
Dept: PHYSICAL THERAPY | Age: 79
Discharge: HOME | End: 2019-06-07
Payer: MEDICARE

## 2019-06-07 PROCEDURE — 97110 THERAPEUTIC EXERCISES: CPT | Performed by: PHYSICAL THERAPIST

## 2019-06-07 NOTE — PROGRESS NOTES
PT DAILY TREATMENT NOTE    Patient Name: Armando Siegel  Date:2019  : 1940  [x]  Patient  Verified  Payor: VA MEDICARE / Plan: VA MEDICARE PART A & B / Product Type: Medicare /    In time:11:30  Out time:12:20  Total Treatment Time (min): 50  Total Timed Codes (min): 50  1:1 Treatment Time ( [address] only): 30   Visit #: 7 of 24    Treatment Area: Low back pain [M54.5]    SUBJECTIVE  Pain Level (0-10 scale): 0  Any medication changes, allergies to medications, adverse drug reactions, diagnosis change, or new procedure performed?: [x] No    [] Yes (see summary sheet for update)  Subjective functional status/changes:   [] No changes reported  Feels alright no new issues. No head ache    OBJECTIVE    30 min Therapeutic Exercise:  [x] See flow sheet :   Rationale: increase ROM, increase strength and decrease pain to improve the patients ability to complete ADLs    With   [] TE   [] TA   [] neuro   [] other: Patient Education: [x] Review HEP    [] Progressed/Changed HEP based on:   [] positioning   [] body mechanics   [] transfers   [] heat/ice application    [] other:      Other Objective/Functional Measures: NA     Pain Level (0-10 scale) post treatment: 0    ASSESSMENT/Changes in Function: Patient responds well to treatment session. Patient is challenged by exercises as prescribed.  No adverse effects were noted from today's treatment session       Patient will continue to benefit from skilled PT services to modify and progress therapeutic interventions, address functional mobility deficits, address ROM deficits, address strength deficits, analyze and address soft tissue restrictions, analyze and cue movement patterns, analyze and modify body mechanics/ergonomics, assess and modify postural abnormalities    [x]  See Plan of Care  []  See progress note/recertification  []  See Discharge Summary         Progress towards goals / Updated goals:  Short Term Goals: To be accomplished in 2 weeks:                 FLORECITA will report compliance with HEP 1x/day to aid in rehabilitation program.                 Status at IE: NA                 Current:Met 06/05/2019.        Long Term Goals: To be accomplished in 4 weeks:                 Patient will increase B LE strength to 4/5 MMT throughout to aid in completion of ADLs.                Status at IE:3+/5                 Current: Same as IE                    WGPNBRD will report pain no greater than 0-2/10 throughout entire day to aid in completion of ADLs.                Status at IE:0-8                 Current:3/10 at least, progressing 5/24/2019                    Patent will be able to stand for 15 mins to be able to complete ADLs.                Status at IE:5 minutes                 RPCTNTW:XFMARCOCHZ reports difficulty with weight bearing secondary to fatigue 05/28/2019                    Patient will improve FOTO score to 65 points to demonstrate improvement in functional status.                  Status at IE:11                 EJJWFVL: Same as IE      PLAN  []  Upgrade activities as tolerated     [x]  Continue plan of care  []  Update interventions per flow sheet       []  Discharge due to:_  []  Other:_      Claire Gallego PT, DPT 6/7/2019  12:17 PM    Future Appointments   Date Time Provider Caprice Leal   6/10/2019 11:00 AM Juan Carlos Rushing PT, DPT [facility]   6/12/2019 11:00 AM Hetty Castleman, PT [facility]   6/17/2019 11:00 AM Hetty Castleman, PT [facility]   6/19/2019 11:00 AM Hetty Castleman, PT [facility]   6/24/2019 11:00 AM Hetty Castleman, PT [facility]   6/26/2019 11:00 AM Hetty Castleman, PT [facility]

## 2019-06-10 ENCOUNTER — HOSPITAL ENCOUNTER (OUTPATIENT)
Dept: PHYSICAL THERAPY | Age: 79
Discharge: HOME | End: 2019-06-10
Payer: MEDICARE

## 2019-06-10 PROCEDURE — 97110 THERAPEUTIC EXERCISES: CPT | Performed by: PHYSICAL THERAPIST

## 2019-06-10 NOTE — PROGRESS NOTES
PT DAILY TREATMENT NOTE    Patient Name: Armando Siegel  Date:6/10/2019  : 1940  [x]  Patient  Verified  Payor: [place] MED[place] / Plan: [place] MED[place]ARE PART A & B / Product Type: Medicare /    In time:11:00  Out time:11:25  Total Treatment Time (min): 25  Total Timed Codes (min): 25  1:1 Treatment Time ( [address] only): 25   Visit #: 8 of 24    Treatment Area: Low back pain [M54.5]    SUBJECTIVE  Pain Level (0-10 scale): 0  Any medication changes, allergies to medications, adverse drug reactions, diagnosis change, or new procedure performed?: [x] No    [] Yes (see summary sheet for update)  Subjective functional status/changes:   [] No changes reported  Feels okay. OBJECTIVE    25 min Therapeutic Exercise:  [x] See flow sheet :   Rationale: increase ROM, increase strength and decrease pain to improve the patients ability to complete ADLs      With   [] TE   [] TA   [] neuro   [] other: Patient Education: [x] Review HEP    [] Progressed/Changed HEP based on:   [] positioning   [] body mechanics   [] transfers   [] heat/ice application    [] other:      Other Objective/Functional Measures:     Vitals Pre-exercise After-warmup   Heart Rate 55bpm 87bpm   Blood pressure 166/95mmHg 150/95mmHg          Pain Level (0-10 scale) post treatment: 0    ASSESSMENT/Changes in Function: Patient responds well to treatment session. Patient vitals are uncontrolled this morning, with drop in systolic of 69VSAH (greater than 10mmHg is considered a indication for termination of exercise). Will f/u next session.  . No adverse effects were noted from today's treatment session    Patient will continue to benefit from skilled PT services to modify and progress therapeutic interventions, address functional mobility deficits, address ROM deficits, address strength deficits, analyze and address soft tissue restrictions, analyze and cue movement patterns, analyze and modify body mechanics/ergonomics, assess and modify postural abnormalities    []  See Plan of Care  []  See progress note/recertification  []  See Discharge Summary         Progress towards goals / Updated goals:  Short Term Goals: To be accomplished in 2 weeks:                 WZIVMTU will report compliance with HEP 1x/day to aid in rehabilitation program.                 Status at IE: NA                 Current:Met 06/05/2019.        Long Term Goals: To be accomplished in 4 weeks:                 BPYVVTL will increase B LE strength to 4/5 MMT throughout to aid in completion of ADLs.                Status at IE:3+/5                 Current: Same as IE                    QAPMNYM will report pain no greater than 0-2/10 throughout entire day to aid in completion of ADLs.                Status at IE:0-8                 Current:3/10 at least, progressing 5/24/2019                    Patent will be able to stand for 15 mins to be able to complete ADLs.                Status at IE:5 minutes                 DLJZODB:DONOVANPQGE reports difficulty with weight bearing secondary to fatigue 05/28/2019                    Patient will improve FOTO score to 65 points to demonstrate improvement in functional status.                  Status at IE:11                 UVHTTPP: Same as IE        PLAN  []  Upgrade activities as tolerated     [x]  Continue plan of care  []  Update interventions per flow sheet       []  Discharge due to:_  []  Other:_      Vanessa Song, PT, DPT 6/10/2019  2:05 PM    Future Appointments   Date Time Provider Caprice Leal   6/12/2019 11:00 AM AMADOR Wolfe [facility]   6/17/2019 11:00 AM AMADOR Wolfe [facility]   6/19/2019 11:00 AM AMADOR Wolfe THE [facility]   6/24/2019 11:00 AM AMADOR Wolfe THE [facility]   6/26/2019 11:00 AM AMADOR Wolfe THE [facility]

## 2019-06-12 ENCOUNTER — HOSPITAL ENCOUNTER (OUTPATIENT)
Dept: PHYSICAL THERAPY | Age: 79
Discharge: HOME | End: 2019-06-12
Payer: MEDICARE

## 2019-06-12 PROCEDURE — 97110 THERAPEUTIC EXERCISES: CPT

## 2019-06-12 NOTE — PROGRESS NOTES
PT DAILY TREATMENT NOTE     Patient Name: Carlos Rock  Date:2019  : 1940  [x]  Patient  Verified  Payor: [place] MEDICARE / Plan: [place] MEDICARE PART A & B / Product Type: Medicare /    In time:1100  Out time:1120  Total Treatment Time (min): 20  Visit #: 9 of 24    Treatment Area: Low back pain [M54.5]    SUBJECTIVE  Pain Level (0-10 scale): 0  Any medication changes, allergies to medications, adverse drug reactions, diagnosis change, or new procedure performed?: [x] No    [] Yes (see summary sheet for update)  Subjective functional status/changes:   [] No changes reported  Patient reports that he is doing well but feels tired today. OBJECTIVE    15 min Therapeutic Exercise:  [x] See flow sheet :   Rationale: increase ROM, increase strength and decrease pain to improve the patients ability to complete ADLs            With   [x] TE   [] TA   [] neuro   [] other: Patient Education: [x] Review HEP    [] Progressed/Changed HEP based on:   [] positioning   [] body mechanics   [] transfers   [] heat/ice application    [] other:      Other Objective/Functional Measures:   Vitals    Heart Rate 77,81,79bpm   Blood pressure 158/80, 146/81, 160/80 mmHg          Pain Level (0-10 scale) post treatment: 0    ASSESSMENT/Changes in Function: Patient had poor response to exercise as his BP decreased with with Nu step activity. Allowed Patient to rest and his BP increased higher than base line prior to exercise. No adverse effects were noted from today's treatment session.  Placed PT on hold until patient has follow up with cardiologist. Patient will continue to benefit from skilled PT services once cleared by cardiologist,   to modify and progress therapeutic interventions, address functional mobility deficits, address ROM deficits, address strength deficits, analyze and address soft tissue restrictions, analyze and cue movement patterns, analyze and modify body mechanics/ergonomics, assess and modify postural abnormalities, address imbalance/dizziness, instruct in home and community integration. []  See Plan of Care  []  See progress note/recertification  []  See Discharge Summary         Progress towards goals / Updated goals:  Short Term Goals: To be accomplished in 2 weeks:                 HGUBJTG will report compliance with HEP 1x/day to aid in rehabilitation program.                 Status at IE: NA                 Current:Met 06/05/2019.        Long Term Goals: To be accomplished in 4 weeks:                 GBTVFUR will increase B LE strength to 4/5 MMT throughout to aid in completion of ADLs.                Status at IE:3+/5                 Current: Same as IE                    NNQZFKZ will report pain no greater than 0-2/10 throughout entire day to aid in completion of ADLs.                Status at IE:0-8                 Current:3/10 at least, progressing 5/24/2019                    Patent will be able to stand for 15 mins to be able to complete ADLs.                Status at IE:5 minutes                 BDQJZMY:DARYA reports difficulty with weight bearing secondary to fatigue 05/28/2019                    Patient will improve FOTO score to 65 points to demonstrate improvement in functional status.                  Status at IE:11                 OHITSBI:  Met 65 06/12/2019           PLAN  []  Upgrade activities as tolerated     [x]  Continue plan of care  []  Update interventions per flow sheet       []  Discharge due to:_  []  Other:_      Quoc Ku PT, DPT 6/12/2019  11:51 AM    Future Appointments   Date Time Provider Caprice Leal   6/19/2019 11:00 AM AMADOR Trent [facility]   6/24/2019 11:00 AM AMADOR Trent [facility]   6/26/2019 11:00 AM AMADOR Trent [facility]

## 2019-06-17 ENCOUNTER — APPOINTMENT (OUTPATIENT)
Dept: PHYSICAL THERAPY | Age: 79
End: 2019-06-17
Payer: MEDICARE

## 2019-06-19 ENCOUNTER — APPOINTMENT (OUTPATIENT)
Dept: PHYSICAL THERAPY | Age: 79
End: 2019-06-19
Payer: MEDICARE

## 2019-06-24 ENCOUNTER — HOSPITAL ENCOUNTER (OUTPATIENT)
Dept: PHYSICAL THERAPY | Age: 79
Discharge: HOME | End: 2019-06-24
Payer: MEDICARE

## 2019-06-24 PROCEDURE — 97110 THERAPEUTIC EXERCISES: CPT

## 2019-06-24 NOTE — PROGRESS NOTES
PT DAILY TREATMENT NOTE - [facility]     Patient Name: Patricia Sidhu  Date:2019  : 1940  [x]  Patient  Verified  Payor: Nadya Hernandez / Plan: [place] MEDICARE PART A & B / Product Type: Medicare /    In time:1100  Out time:1125  Total Treatment Time (min): 25  Total Timed Codes (min): 25   1:1 Treatment Time ( [address] only): 25   Visit #: 10 of 24    Treatment Area: Low back pain [M54.5]    SUBJECTIVE  Pain Level (0-10 scale): 0  Any medication changes, allergies to medications, adverse drug reactions, diagnosis change, or new procedure performed?: [x] No    [] Yes (see summary sheet for update)  Subjective functional status/changes:   [] No changes reported  Patient and spouse reports that he had follow up with physician regarding his BP response to exercise. He received an adjustment in medication. OBJECTIVE      25 min Therapeutic Exercise:  [x] See flow sheet :   Rationale: increase ROM, increase strength and decrease pain to improve the patients ability to complete ADLs         With   [x] TE   [] TA   [] neuro   [] other: Patient Education: [x] Review HEP    [] Progressed/Changed HEP based on:   [] positioning   [] body mechanics   [] transfers   [] heat/ice application    [] other:      Other Objective/Functional Measures:   Vitals    Heart Rate 80, 77bpm   Blood pressure 158/71, 151/92 mmHg          Pain Level (0-10 scale) post treatment: 0    ASSESSMENT/Changes in Function:  Patient responded fairly to exercise as his diastolic BP increased significantly. Patient systolic BP did not change after 5 minute of activity. Educated patient and family on response to exercise in regard to BP. Placed PT on hold until they follow up with physician regarding poor response to exercise.  No adverse effects were noted from today's treatment session      Patient will continue to benefit from skilled PT services to modify and progress therapeutic interventions, address functional mobility deficits, address ROM deficits, address strength deficits, analyze and address soft tissue restrictions, analyze and cue movement patterns, analyze and modify body mechanics/ergonomics, assess and modify postural abnormalities, instruct in home and community integration to attain remaining goals. []  See Plan of Care  []  See progress note/recertification  []  See Discharge Summary         Progress towards goals / Updated goals:    Goals not addressed as patient is limited by response to exercise secondary to BP 06/24/2019    Short Term Goals: To be accomplished in 2 weeks:                 WYTONYWU will report compliance with HEP 1x/day to aid in rehabilitation program.                 Status at IE: NA                 Current:Met 06/05/2019.     Long Term Goals: To be accomplished in 4 weeks:                 Patient will increase B LE strength to 4/5 MMT throughout to aid in completion of ADLs.                Status at IE:3+/5                 Current: Same as IE                    TCQMFJR will report pain no greater than 0-2/10 throughout entire day to aid in completion of ADLs.                Status at IE:0-8                 Current:3/10 at least, progressing 5/24/2019                    Patent will be able to stand for 15 mins to be able to complete ADLs.                Status at IE:5 minutes                 WZNXGHW:ZFPKDBY reports difficulty with weight bearing secondary to fatigue 05/28/2019                    Patient will improve FOTO score to 65 points to demonstrate improvement in functional status.                Status at IE:11                 PXWKAQL:  Met 65 06/12/2019    PLAN  []  Upgrade activities as tolerated     [x]  Continue plan of care  []  Update interventions per flow sheet       []  Discharge due to:_  []  Other:_      Terrell Geovany PT, DPT 6/24/2019  11:30 AM    No future appointments.

## 2019-06-26 ENCOUNTER — APPOINTMENT (OUTPATIENT)
Dept: PHYSICAL THERAPY | Age: 79
End: 2019-06-26
Payer: MEDICARE

## 2019-07-16 ENCOUNTER — HOSPITAL ENCOUNTER (OUTPATIENT)
Dept: PHYSICAL THERAPY | Age: 79
Discharge: HOME | End: 2019-07-16

## 2019-07-16 NOTE — PROGRESS NOTES
[facility] at [address] [place] [address] Drive: [phone]   Fax: [phone]  Discharge Summary  Patient Name: Shagufta Campbell : 1940   Medical   Diagnosis: Low back pain [M54.5] Treatment Diagnosis: Low Back Pain   Difficulty Walking   Onset Date: 3 months ago     Referral Source: Laila Downs MD Start of Care [facility]): 2019   Prior Hospitalization: See medical history Provider #: [id number]   Prior Level of Function: Active, Water Aerobics   Comorbidities: High Blood Pressure, High Cholesterol, Hx TIA, OA, Thyroidism, TBI   Medications: Verified on Patient Summary List      ===========================================================================================  Assessment / Summary of Care:  Shagufta Campbell is a 70 y.o.  male with  c/o low back pain and difficulty walking. PT was placed on hold due to uncontrolled BP. Patient called and reported BP was now under control, however, patient states that he no longer wants to continue physical therapy. Patient has not returned to clinic in 30 days. Unable to perform formal assessment on patient as a result.     ===========================================================================================    Plan: Discharge to [facility].     Goals: Unable to reassess.    ===========================================================================================  Subjective: NA      Objective: NA    Therapist Signature: Ayaka Gresham PT, DPT Date: 2019     Time: 4:30 PM

## 2019-10-19 ENCOUNTER — APPOINTMENT (OUTPATIENT)
Dept: CT IMAGING | Age: 79
End: 2019-10-19
Attending: PHYSICIAN ASSISTANT
Payer: MEDICARE

## 2019-10-19 ENCOUNTER — HOSPITAL ENCOUNTER (EMERGENCY)
Age: 79
Discharge: HOME | End: 2019-10-19
Attending: EMERGENCY MEDICINE
Payer: MEDICARE

## 2019-10-19 ENCOUNTER — APPOINTMENT (OUTPATIENT)
Dept: GENERAL RADIOLOGY | Age: 79
End: 2019-10-19
Attending: PHYSICIAN ASSISTANT
Payer: MEDICARE

## 2019-10-19 VITALS
HEIGHT: 66 IN | BODY MASS INDEX: 29.09 KG/M2 | RESPIRATION RATE: 18 BRPM | DIASTOLIC BLOOD PRESSURE: 69 MMHG | WEIGHT: 181 LBS | HEART RATE: 65 BPM | SYSTOLIC BLOOD PRESSURE: 137 MMHG | TEMPERATURE: 97.7 F | OXYGEN SATURATION: 97 %

## 2019-10-19 DIAGNOSIS — R07.89 ATYPICAL CHEST PAIN: Primary | ICD-10-CM

## 2019-10-19 LAB
ALBUMIN SERPL-MCNC: 3.7 G/DL (ref 3.4–5)
ALBUMIN/GLOB SERPL: 1 {RATIO} (ref 0.8–1.7)
ALP SERPL-CCNC: 150 U/L (ref 45–117)
ALT SERPL-CCNC: 54 U/L (ref 16–61)
ANION GAP SERPL CALC-SCNC: 3 MMOL/L (ref 3–18)
AST SERPL-CCNC: 35 U/L (ref 10–38)
ATRIAL RATE: 68 BPM
BASOPHILS # BLD: 0 K/UL (ref 0–0.1)
BASOPHILS NFR BLD: 1 % (ref 0–2)
BILIRUB SERPL-MCNC: 0.4 MG/DL (ref 0.2–1)
BUN SERPL-MCNC: 15 MG/DL (ref 7–18)
BUN/CREAT SERPL: 15 (ref 12–20)
CALCIUM SERPL-MCNC: 8.7 MG/DL (ref 8.5–10.1)
CALCULATED P AXIS, ECG09: 47 DEGREES
CALCULATED R AXIS, ECG10: 32 DEGREES
CALCULATED T AXIS, ECG11: 48 DEGREES
CHLORIDE SERPL-SCNC: 110 MMOL/L (ref 100–111)
CK MB CFR SERPL CALC: 0.8 % (ref 0–4)
CK MB CFR SERPL CALC: 0.9 % (ref 0–4)
CK MB SERPL-MCNC: 1.7 NG/ML (ref 5–25)
CK MB SERPL-MCNC: 2.2 NG/ML (ref 5–25)
CK SERPL-CCNC: 207 U/L (ref 39–308)
CK SERPL-CCNC: 237 U/L (ref 39–308)
CO2 SERPL-SCNC: 30 MMOL/L (ref 21–32)
CREAT SERPL-MCNC: 1.01 MG/DL (ref 0.6–1.3)
DIAGNOSIS, 93000: NORMAL
DIFFERENTIAL METHOD BLD: ABNORMAL
EOSINOPHIL # BLD: 0.1 K/UL (ref 0–0.4)
EOSINOPHIL NFR BLD: 2 % (ref 0–5)
ERYTHROCYTE [DISTWIDTH] IN BLOOD BY AUTOMATED COUNT: 13.3 % (ref 11.6–14.5)
GLOBULIN SER CALC-MCNC: 3.6 G/DL (ref 2–4)
GLUCOSE SERPL-MCNC: 88 MG/DL (ref 74–99)
HCT VFR BLD AUTO: 47.9 % (ref 36–48)
HGB BLD-MCNC: 15.1 G/DL (ref 13–16)
LIPASE SERPL-CCNC: 59 U/L (ref 73–393)
LYMPHOCYTES # BLD: 1.4 K/UL (ref 0.9–3.6)
LYMPHOCYTES NFR BLD: 37 % (ref 21–52)
MCH RBC QN AUTO: 30 PG (ref 24–34)
MCHC RBC AUTO-ENTMCNC: 31.5 G/DL (ref 31–37)
MCV RBC AUTO: 95.2 FL (ref 74–97)
MONOCYTES # BLD: 0.6 K/UL (ref 0.05–1.2)
MONOCYTES NFR BLD: 15 % (ref 3–10)
NEUTS SEG # BLD: 1.8 K/UL (ref 1.8–8)
NEUTS SEG NFR BLD: 45 % (ref 40–73)
P-R INTERVAL, ECG05: 124 MS
PLATELET # BLD AUTO: 142 K/UL (ref 135–420)
PMV BLD AUTO: 9.4 FL (ref 9.2–11.8)
POTASSIUM SERPL-SCNC: 4.1 MMOL/L (ref 3.5–5.5)
PROT SERPL-MCNC: 7.3 G/DL (ref 6.4–8.2)
Q-T INTERVAL, ECG07: 408 MS
QRS DURATION, ECG06: 86 MS
QTC CALCULATION (BEZET), ECG08: 433 MS
RBC # BLD AUTO: 5.03 M/UL (ref 4.7–5.5)
SODIUM SERPL-SCNC: 143 MMOL/L (ref 136–145)
TROPONIN I SERPL-MCNC: <0.02 NG/ML (ref 0–0.04)
TROPONIN I SERPL-MCNC: <0.02 NG/ML (ref 0–0.04)
VENTRICULAR RATE, ECG03: 68 BPM
WBC # BLD AUTO: 3.8 K/UL (ref 4.6–13.2)

## 2019-10-19 PROCEDURE — 93005 ELECTROCARDIOGRAM TRACING: CPT

## 2019-10-19 PROCEDURE — 82550 ASSAY OF CK (CPK): CPT

## 2019-10-19 PROCEDURE — 74011636320 HC RX REV CODE- 636/320: Performed by: EMERGENCY MEDICINE

## 2019-10-19 PROCEDURE — 80053 COMPREHEN METABOLIC PANEL: CPT

## 2019-10-19 PROCEDURE — 83690 ASSAY OF LIPASE: CPT

## 2019-10-19 PROCEDURE — 85025 COMPLETE CBC W/AUTO DIFF WBC: CPT

## 2019-10-19 PROCEDURE — 99285 EMERGENCY DEPT VISIT HI MDM: CPT

## 2019-10-19 PROCEDURE — 71046 X-RAY EXAM CHEST 2 VIEWS: CPT

## 2019-10-19 PROCEDURE — 71275 CT ANGIOGRAPHY CHEST: CPT

## 2019-10-19 RX ORDER — LOSARTAN POTASSIUM 100 MG/1
100 TABLET ORAL DAILY
COMMUNITY

## 2019-10-19 RX ORDER — AMLODIPINE BESYLATE 2.5 MG/1
2.5 TABLET ORAL DAILY
COMMUNITY

## 2019-10-19 RX ORDER — LANOLIN ALCOHOL/MO/W.PET/CERES
CREAM (GRAM) TOPICAL
COMMUNITY

## 2019-10-19 RX ORDER — GLUCOSAMINE SULFATE 1500 MG
POWDER IN PACKET (EA) ORAL DAILY
COMMUNITY

## 2019-10-19 RX ORDER — CALCIUM CARBONATE 500(1250)
TABLET ORAL DAILY
COMMUNITY

## 2019-10-19 RX ORDER — ASPIRIN 325 MG
325 TABLET ORAL DAILY
COMMUNITY

## 2019-10-19 RX ORDER — MEMANTINE HYDROCHLORIDE 5 MG/1
TABLET ORAL DAILY
COMMUNITY

## 2019-10-19 RX ORDER — TAMSULOSIN HYDROCHLORIDE 0.4 MG/1
0.4 CAPSULE ORAL DAILY
COMMUNITY

## 2019-10-19 RX ADMIN — IOPAMIDOL 100 ML: 755 INJECTION, SOLUTION INTRAVENOUS at 14:02
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
## 2019-10-19 NOTE — DISCHARGE INSTRUCTIONS
Make sure to contact your PCP on Monday for follow-up care  Keep all appointments with pulmonary as discussed  Tylenol for pain  Rest  Any worsening symptoms return to ER immediately     Chest Pain: Care Instructions  Your Care Instructions    There are many things that can cause chest pain. Some are not serious and will get better on their own in a few days. But some kinds of chest pain need more testing and treatment. Your doctor may have recommended a follow-up visit in the next 8 to 12 hours. If you are not getting better, you may need more tests or treatment. Even though your doctor has released you, you still need to watch for any problems. The doctor carefully checked you, but sometimes problems can develop later. If you have new symptoms or if your symptoms do not get better, get medical care right away. If you have worse or different chest pain or pressure that lasts more than 5 minutes or you passed out (lost consciousness), call 911 or seek other emergency help right away. A medical visit is only one step in your treatment. Even if you feel better, you still need to do what your doctor recommends, such as going to all suggested follow-up appointments and taking medicines exactly as directed. This will help you recover and help prevent future problems. How can you care for yourself at home? · Rest until you feel better. · Take your medicine exactly as prescribed. Call your doctor if you think you are having a problem with your medicine. · Do not drive after taking a prescription pain medicine. When should you call for help? Call 911 if:    · You passed out (lost consciousness).     · You have severe difficulty breathing.     · You have symptoms of a heart attack. These may include:  ? Chest pain or pressure, or a strange feeling in your chest.  ? Sweating. ? Shortness of breath. ? Nausea or vomiting.   ? Pain, pressure, or a strange feeling in your back, neck, jaw, or upper belly or in one or both shoulders or arms. ? Lightheadedness or sudden weakness. ? A fast or irregular heartbeat. After you call 911, the  may tell you to chew 1 adult-strength or 2 to 4 low-dose aspirin. Wait for an ambulance. Do not try to drive yourself.    Call your doctor today if:    · You have any trouble breathing.     · Your chest pain gets worse.     · You are dizzy or lightheaded, or you feel like you may faint.     · You are not getting better as expected.     · You are having new or different chest pain. Where can you learn more? Go to http://lauri-jayy.info/. Enter A120 in the search box to learn more about \"Chest Pain: Care Instructions. \"  Current as of: June 26, 2019  Content Version: 12.2  © 2265-0950 [employer]. Care instructions adapted under license by [employer] (which disclaims liability or warranty for this information). If you have questions about a medical condition or this instruction, always ask your healthcare professional. [place] [address] any warranty or liability for your use of this information.

## 2020-07-22 ENCOUNTER — HOSPITAL ENCOUNTER (OUTPATIENT)
Dept: PHYSICAL THERAPY | Age: 80
Discharge: HOME | End: 2020-07-22
Payer: MEDICARE

## 2020-07-22 PROCEDURE — 97110 THERAPEUTIC EXERCISES: CPT

## 2020-07-22 PROCEDURE — 97161 PT EVAL LOW COMPLEX 20 MIN: CPT
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
## 2020-07-27 ENCOUNTER — HOSPITAL ENCOUNTER (OUTPATIENT)
Dept: PHYSICAL THERAPY | Age: 80
Discharge: HOME | End: 2020-07-27
Payer: MEDICARE

## 2020-07-27 PROCEDURE — 97110 THERAPEUTIC EXERCISES: CPT

## 2020-07-27 NOTE — PROGRESS NOTES
PT DAILY TREATMENT NOTE - [facility]     Patient Name: Edwin Weston  Date:2020  : 1940  [x]  Patient  Verified  Payor: Dennie Floro / [place] / Product Type: Medicare /    In OBXZ:5262  Out time:1050  Total Treatment Time (min): 55  Total Timed Codes (min): 55   1:1 Treatment Time ( [address] only): 55   Visit #: 2 of 24    Treatment Area: Other abnormalities of gait and mobility [R26.89]  Right leg weakness [R29.898]  Left leg weakness [R29.898]    SUBJECTIVE  Pain Level (0-10 scale): 0  Any medication changes, allergies to medications, adverse drug reactions, diagnosis change, or new procedure performed?: [x] No    [] Yes (see summary sheet for update)  Subjective functional status/changes:   [] No changes reported    Patient reports no new changes since initial evaluation. OBJECTIVE    55 min Therapeutic Exercise:  [x] See flow sheet :   Rationale: increase ROM, increase strength and decrease pain to improve the patients ability to complete ADLs          With   [x] TE   [] TA   [] neuro   [] other: Patient Education: [x] Review HEP    [] Progressed/Changed HEP based on:   [] positioning   [] body mechanics   [] transfers   [] heat/ice application    [] other:      Other Objective/Functional Measures:   Vitals     Heart Rate 82 bpm   Blood pressure 160/90 mmHg         Pain Level (0-10 scale) post treatment: 0    ASSESSMENT/Changes in Function: Patient responds well to treatment session. Patient required several cues for activity pacing. He was challenged with exercise prescribed. Provided close supervision for safety with marching activity.  No adverse effects were noted from today's treatment session    Patient will continue to benefit from skilled PT services to modify and progress therapeutic interventions, address functional mobility deficits, address ROM deficits, address strength deficits, analyze and address soft tissue restrictions, analyze and cue movement patterns, analyze and modify body mechanics/ergonomics, assess and modify postural abnormalities,  and instruct in home and community integration to attain remaining goals. []  See Plan of Care  []  See progress note/recertification  []  See Discharge Summary         Progress towards goals / Updated goals:  Short Term Goals: To be accomplished in 4 weeks:                 Patient will report compliance with HEP at least 1x/day to aid in rehabilitation program.                 Status at IE: Reviewed safety with AD                 Current: reviewed sit to stand mechanics and safety 7/27/2020                    Patient will decrease TUG by 10 seconds to display MCID and progression to decreased falls risk. Status at IE: 35 seconds                 Current: Same as IE     Long Term Goals: To be accomplished in 8 weeks:                 Patient will increase strength to 4+/5 throughout B LEs to aid in return recreational activities and ADLs. Status at IE: 4-/5                 Current:Same as IE                     Patient will improve Tinetti score to 18 to demonstrate decreased risk for falls. Status at IE: 9                 Current:Same as IE                    Patient will ambulate 1000ft on level surface with LRAD and minimal cues to clear right foot during gait cycle. Status at IE:2WW and drags right foot                 Current:Same as IE                    Patient will improve FOTO to 48 points overall to demonstrate improvement in functional ability.                  Status at IE:FOTO score = 41 (an established functional score where 100 = no disability)                 Current: Same as IE    PLAN  []  Upgrade activities as tolerated     [x]  Continue plan of care  []  Update interventions per flow sheet       []  Discharge due to:_  []  Other:_      Tia Monday, PT, DPT 7/27/2020  9:40 AM    Future Appointments   Date Time Provider Caprice Leal 7/27/2020 10:00 AM Thelda Corolla, PT [facility]   7/29/2020 12:30 PM Thelda Corolla, PT [facility]   7/31/2020 10:00 AM Thelda Corolla, PT [facility]   8/3/2020  1:45 PM Thelda Corolla, PT [facility]   8/5/2020 11:00 AM Thelda Corolla, PT [facility]   8/7/2020 11:00 AM Thelda Corolla, PT [facility]   8/10/2020 11:00 AM Thelda Corolla, PT [facility]   8/12/2020 11:00 AM Thelda Corolla, PT [facility]   8/14/2020 11:00 AM Thelda Corolla, PT [facility]   8/17/2020 11:00 AM Thelda Corolla, PT [facility]   8/19/2020 11:00 AM Thelda Corolla, PT [facility]   8/21/2020 11:00 AM Thelda Corolla, PT [facility]   8/24/2020 11:00 AM Thelda Corolla, PT [facility]   8/26/2020 11:00 AM Thelda Corolla, PT [facility]   8/28/2020 11:00 AM Thelda Corolla, PT [facility]

## 2020-07-29 ENCOUNTER — HOSPITAL ENCOUNTER (OUTPATIENT)
Dept: PHYSICAL THERAPY | Age: 80
Discharge: HOME | End: 2020-07-29
Payer: MEDICARE

## 2020-07-29 PROCEDURE — 97110 THERAPEUTIC EXERCISES: CPT

## 2020-07-29 NOTE — PROGRESS NOTES
PT DAILY TREATMENT NOTE - [facility]     Patient Name: Taras Herndon  Date:2020  : 1940  [x]  Patient  Verified  Payor: Dale Glaser / [place] / Product Type: Medicare /    In time:1230  Out time:115  Total Treatment Time (min): 45  Total Timed Codes (min): 45   1:1 Treatment Time ([facility] only): 45   Visit #: 3 of 24    Treatment Area: Other abnormalities of gait and mobility [R26.89]  Right leg weakness [R29.898]  Left leg weakness [R29.898]    SUBJECTIVE  Pain Level (0-10 scale): 0  Any medication changes, allergies to medications, adverse drug reactions, diagnosis change, or new procedure performed?: [x] No    [] Yes (see summary sheet for update)  Subjective functional status/changes:   [] No changes reported    Patient reports that he is doing well and was tired but not sore after last visit. OBJECTIVE    45 min Therapeutic Exercise:  [x] See flow sheet :   Rationale: increase ROM, increase strength and decrease pain to improve the patients ability to complete ADLs          With   [x] TE   [] TA   [] neuro   [] other: Patient Education: [x] Review HEP    [] Progressed/Changed HEP based on:   [] positioning   [] body mechanics   [] transfers   [] heat/ice application    [] other:      Other Objective/Functional Measures: NA     Pain Level (0-10 scale) post treatment: 0    ASSESSMENT/Changes in Function: Patient responds well to treatment session. Patient required close supervision and CGA for safety with weight bearing exercise. He had poor recall of exercise parameters. He also required management for activity pacing.  No adverse effects were noted from today's treatment session    Patient will continue to benefit from skilled PT services to modify and progress therapeutic interventions, address functional mobility deficits, address ROM deficits, address strength deficits, analyze and address soft tissue restrictions, analyze and cue movement patterns, analyze and modify body mechanics/ergonomics, assess and modify postural abnormalities, instruct in home and community integration to attain remaining goals. []  See Plan of Care  []  See progress note/recertification  []  See Discharge Summary         Progress towards goals / Updated goals:  Short Term Goals: To be accomplished in 4 weeks:                 DRXMAXO will report compliance with HEP at least 1x/day to aid in rehabilitation program.                 Status at IE: Reviewed safety with AD                 Current: reviewed sit to stand mechanics and safety 7/27/2020                    Patient will decrease TUG by 10 seconds to display MCID and progression to decreased falls risk.                 Status at IE: 35 seconds                 Current: Same as IE     Long Term Goals: To be accomplished in 8 weeks:                 Patient will increase strength to 4+/5 throughout B LEs to aid in return recreational activities and ADLs.                Status at IE: 4-/5                 Current:Same as IE                     Patient will improve Tinetti score to 18 to demonstrate decreased risk for falls.                 Status at IE: 9                 Current:Same as IE                    Patient will ambulate 1000ft on level surface with LRAD and minimal cues to clear right foot during gait cycle.                 Status at IE:2WW and drags right foot                 Current:Same as IE                    Patient will improve FOTO to 48 points overall to demonstrate improvement in functional ability.                  Status at IE:FOTO score = 41 (an established functional score where 100 = no disability)                 Current: Same as IE    PLAN  []  Upgrade activities as tolerated     [x]  Continue plan of care  []  Update interventions per flow sheet       []  Discharge due to:_  []  Other:_      Jayden Cunningham, PT, DPT 7/29/2020  11:59 AM    Future Appointments   Date Time Provider Caprice Leal   7/29/2020 12:30 PM Raumfeld, Kathy Clement, PT [facility]   7/31/2020 10:00 AM Christina Nathanael, PT [facility]   8/3/2020  1:45 PM Christina Nathanael, PT [facility]   8/5/2020 11:00 AM Christina Nathanael, PT [facility]   8/7/2020 11:00 AM Christina Nathanael, PT [facility]   8/10/2020 11:00 AM Christina Nathanael, PT [facility]   8/12/2020 11:00 AM Christina Nathanael, PT [facility]   8/14/2020 11:00 AM Christina Nathanael, PT [facility]   8/17/2020 11:00 AM Christina Nathanael, PT [facility]   8/19/2020 11:00 AM Christina Nathanael, PT [facility]   8/21/2020 11:00 AM Christina Nathanael, PT [facility]   8/24/2020 11:00 AM Christina Nathanael, PT [facility]   8/26/2020 11:00 AM Christina Nathanael, PT [facility]   8/28/2020 11:00 AM Christina Nathanael, PT [facility]

## 2020-07-31 ENCOUNTER — HOSPITAL ENCOUNTER (OUTPATIENT)
Dept: PHYSICAL THERAPY | Age: 80
Discharge: HOME | End: 2020-07-31
Payer: MEDICARE

## 2020-07-31 PROCEDURE — 97110 THERAPEUTIC EXERCISES: CPT

## 2020-07-31 PROCEDURE — 97530 THERAPEUTIC ACTIVITIES: CPT

## 2020-07-31 NOTE — PROGRESS NOTES
PT DAILY TREATMENT NOTE - [facility]     Patient Name: Ron Tena  Date:2020  : 1940  [x]  Patient  Verified  Payor: Mayra Wang / [place] / Product Type: Medicare /    In time:1000  Out time:1040  Total Treatment Time (min): 40  Total Timed Codes (min): 40   1:1 Treatment Time ([facility] only): 40   Visit #: 4 of 24    Treatment Area: Other abnormalities of gait and mobility [R26.89]  Right leg weakness [R29.898]  Left leg weakness [R29.898]    SUBJECTIVE  Pain Level (0-10 scale): 0  Any medication changes, allergies to medications, adverse drug reactions, diagnosis change, or new procedure performed?: [x] No    [] Yes (see summary sheet for update)  Subjective functional status/changes:   [] No changes reported    Patient reports that he has no pain but is stiff from last visit. He states that he has difficulty with sit to stand transfer. OBJECTIVE    25 min Therapeutic Exercise:  [x] See flow sheet :   Rationale: increase ROM, increase strength and decrease pain to improve the patients ability to complete ADLs    15 min Therapeutic Activity:  [x]  See flow sheet :   Rationale: increase ROM, increase strength and improve coordination  to improve the patients ability to complete ADLs          With   [x] TE   [] TA   [] neuro   [] other: Patient Education: [x] Review HEP    [] Progressed/Changed HEP based on:   [] positioning   [] body mechanics   [] transfers   [] heat/ice application    [] other:      Other Objective/Functional Measures: NA     Pain Level (0-10 scale) post treatment: 0    ASSESSMENT/Changes in Function: Patient responds well to treatment session. Patient was challenged with exercises prescribed. Reduced exercise intensity and promoted increased recovery periods. He required cues for proper sit to stand mechanics and CGA for safety.  No adverse effects were noted from today's treatment session      Patient will continue to benefit from skilled PT services to modify and progress therapeutic interventions, address functional mobility deficits, address ROM deficits, address strength deficits, analyze and address soft tissue restrictions, analyze and cue movement patterns, analyze and modify body mechanics/ergonomics, assess and modify postural abnormalities, address imbalance and instruct in home and community integration to attain remaining goals. []  See Plan of Care  []  See progress note/recertification  []  See Discharge Summary         Progress towards goals / Updated goals:  Short Term Goals: To be accomplished in 4 weeks:                 DDTGYVO will report compliance with HEP at least 1x/day to aid in rehabilitation program.                 Status at IE: Reviewed safety with AD                 Current: reviewed sit to stand mechanics and safety 7/27/2020                    Patient will decrease TUG by 10 seconds to display MCID and progression to decreased falls risk.                 Status at IE: 35 seconds                 Current: Same as IE     Long Term Goals: To be accomplished in 8 weeks:                 Patient will increase strength to 4+/5 throughout B LEs to aid in return recreational activities and ADLs.                Status at IE: 4-/5                 Current:Same as IE                     Patient will improve Tinetti score to 18 to demonstrate decreased risk for falls.                 Status at IE: 9                 Current:Same as IE                    Patient will ambulate 1000ft on level surface with LRAD and minimal cues to clear right foot during gait cycle.                 Status at IE:2WW and drags right foot                 Current:Same as IE                    Patient will improve FOTO to 48 points overall to demonstrate improvement in functional ability.                  Status at IE:FOTO score = 41 (an established functional score where 100 = no disability)                 Current: Same as IE    PLAN  []  Upgrade activities as tolerated     []  Continue plan of care  []  Update interventions per flow sheet       []  Discharge due to:_  []  Other:_      Ady Dey PT, DPT 7/31/2020  9:54 AM    Future Appointments   Date Time Provider Caprice Leal   7/31/2020 10:00 AM Elizabeth Socks, PT [facility]   8/3/2020  1:45 PM Elizabeth Socks, PT [facility]   8/5/2020 11:00 AM Elizabeth Socks, PT [facility]   8/7/2020 11:00 AM Elizabeth Socks, PT [facility]   8/10/2020 11:00 AM Elizabeth Socks, PT [facility]   8/12/2020 11:00 AM Elizabeth Socks, PT [facility]   8/14/2020 11:00 AM Elizabeth Socks, PT [facility]   8/17/2020 11:00 AM Elizabeth Socks, PT [facility]   8/19/2020 11:00 AM Elizabeth Socks, PT [facility]   8/21/2020 11:00 AM Elizabeth Socks, PT [facility]   8/24/2020 11:00 AM Elizabeth Socks, PT [facility]   8/26/2020 11:00 AM Elizabteh Socks, PT [facility]   8/28/2020 11:00 AM Elizabeth Socks, PT [facility]

## 2020-08-03 ENCOUNTER — HOSPITAL ENCOUNTER (OUTPATIENT)
Dept: PHYSICAL THERAPY | Age: 80
Discharge: HOME | End: 2020-08-03
Payer: MEDICARE

## 2020-08-03 PROCEDURE — 97110 THERAPEUTIC EXERCISES: CPT

## 2020-08-03 PROCEDURE — 97112 NEUROMUSCULAR REEDUCATION: CPT

## 2020-08-03 NOTE — PROGRESS NOTES
PT DAILY TREATMENT NOTE - [facility]     Patient Name: Chidi Other  Date:8/3/2020  : 1940  [x]  Patient  Verified  [place]or[place] MED[place] / Plan: [place] MED[place]ARE [place] A & B / Product Type: Medicare /    In time:1340  Out time:1435  Total Treatment Time (min): 55  Total Timed Codes (min): 55   1:1 Treatment Time ( [address] only): 55   Visit #: 5 of 24    Treatment Area: Other abnormalities of gait and mobility [R26.89]  Right leg weakness [R29.898]  Left leg weakness [R29.898]    SUBJECTIVE  Pain Level (0-10 scale): 0  Any medication changes, allergies to medications, adverse drug reactions, diagnosis change, or new procedure performed?: [x] No    [] Yes (see summary sheet for update)  Subjective functional status/changes:   [] No changes reported    Patient reports no new changes since last visit. OBJECTIVE       45 min Therapeutic Exercise:  [x] See flow sheet :   Rationale: increase ROM, increase strength and decrease pain to improve the patients ability to complete ADLs    10 min Neuromuscular Re-education:  -  See flow sheet :   Rationale: improve coordination, improve balance and increase proprioception  to improve the patients ability to complete ADLs            With   [x] TE   [] TA   [] neuro   [] other: Patient Education: [x] Review HEP    [] Progressed/Changed HEP based on:   [] positioning   [] body mechanics   [] transfers   [] heat/ice application    [] other:      Other Objective/Functional Measures: NA     Pain Level (0-10 scale) post treatment: 0    ASSESSMENT/Changes in Function: Patient responds well to treatment session. Patient required CGA for safety with toe taps and sit to stand exercise. He is challenged with exercises prescribed.  No adverse effects were noted from today's treatment session    Patient will continue to benefit from skilled PT services to modify and progress therapeutic interventions, address functional mobility deficits, address ROM deficits, address strength deficits, analyze and address soft tissue restrictions, analyze and cue movement patterns, analyze and modify body mechanics/ergonomics, assess and modify postural abnormalities, address imbalance and instruct in home and community integration to attain remaining goals. []  See Plan of Care  []  See progress note/recertification  []  See Discharge Summary         Progress towards goals / Updated goals:  Short Term Goals: To be accomplished in 4 weeks:                 ONEL will report compliance with HEP at least 1x/day to aid in rehabilitation program.                 Status at IE: Reviewed safety with AD                 Current: reviewed sit to stand mechanics and safety 7/27/2020                    Patient will decrease TUG by 10 seconds to display MCID and progression to decreased falls risk.                 Status at IE: 35 seconds                 Current: Same as IE     Long Term Goals: To be accomplished in 8 weeks:                 Patient will increase strength to 4+/5 throughout B LEs to aid in return recreational activities and ADLs.                Status at IE: 4-/5                 Current:Same as IE                     Patient will improve Tinetti score to 18 to demonstrate decreased risk for falls.                 Status at IE: 9                 Current:Same as IE                    Patient will ambulate 1000ft on level surface with LRAD and minimal cues to clear right foot during gait cycle.                 Status at IE:2WW and drags right foot                 Current:Same as IE                    Patient will improve FOTO to 48 points overall to demonstrate improvement in functional ability.                  Status at IE:FOTO score = 41 (an established functional score where 100 = no disability)                 Current: Same as IE    PLAN  []  Upgrade activities as tolerated     [x]  Continue plan of care  []  Update interventions per flow sheet       []  Discharge due to:_  []  Other:_      Veronica Cadet Brandy Regalado DPT 8/3/2020  1:43 PM    Future Appointments   Date Time Provider Caprice Leal   8/3/2020  1:45 PM Thelda Corolla, PT [facility]   8/5/2020 11:00 AM Thelda Corolla, PT [facility]   8/7/2020 11:00 AM Thelda Corolla, PT [facility]   8/10/2020 11:00 AM Thelda Corolla, PT [facility]   8/12/2020 11:00 AM Thelda Corolla, PT [facility]   8/14/2020 11:00 AM Thelda Corolla, PT [facility]   8/17/2020 11:00 AM Thelda Corolla, PT [facility]   8/19/2020 11:00 AM Thelda Corolla, PT [facility]   8/21/2020 11:00 AM Thelda Corolla, PT [facility]   8/24/2020 11:00 AM Thelda Corolla, PT [facility]   8/26/2020 11:00 AM Thelda Corolla, PT [facility]   8/28/2020 11:00 AM Thelda Corolla, PT [facility]

## 2020-08-05 ENCOUNTER — HOSPITAL ENCOUNTER (OUTPATIENT)
Dept: PHYSICAL THERAPY | Age: 80
Discharge: HOME | End: 2020-08-05
Payer: MEDICARE

## 2020-08-05 PROCEDURE — 97110 THERAPEUTIC EXERCISES: CPT

## 2020-08-05 PROCEDURE — 97112 NEUROMUSCULAR REEDUCATION: CPT

## 2020-08-05 NOTE — PROGRESS NOTES
PT DAILY TREATMENT NOTE - [facility]     Patient Name: Heriberto Clinton  Date:2020  : 1940  [x]  Patient  Verified  Payor: [place] MEDICARE / Plan: [place] MEDICARE PART A & B / Product Type: Medicare /    In time:1100  Out time:1145  Total Treatment Time (min): 45  Total Timed Codes (min): 45   1:1 Treatment Time ( [address] only): 45   Visit #: 6 of 24    Treatment Area: Other abnormalities of gait and mobility [R26.89]  Right leg weakness [R29.898]  Left leg weakness [R29.898]    SUBJECTIVE  Pain Level (0-10 scale): 0  Any medication changes, allergies to medications, adverse drug reactions, diagnosis change, or new procedure performed?: [x] No    [] Yes (see summary sheet for update)  Subjective functional status/changes:   [] No changes reported    Patient reports that he is doing well no new changes since last visit. OBJECTIVE      37 min Therapeutic Exercise:  [] See flow sheet :   Rationale: increase ROM, increase strength and decrease pain to improve the patients ability to complete ADLs       8 min Neuromuscular Re-education:  -  See flow sheet :   Rationale: improve coordination, improve balance and increase proprioception  to improve the patients ability to complete ADLs           With   [x] TE   [] TA   [] neuro   [] other: Patient Education: [x] Review HEP    [] Progressed/Changed HEP based on:   [] positioning   [] body mechanics   [] transfers   [] heat/ice application    [] other:      Other Objective/Functional Measures: NA     Pain Level (0-10 scale) post treatment: 0    ASSESSMENT/Changes in Function: Patient responds well to treatment session. Patient demonstrated improved activity tolerance requiring fewer recovery periods. Provided cues to reduce compensatory strategies with sit to stand exercise. Provided CGA for safety.  No adverse effects were noted from today's treatment session    Patient will continue to benefit from skilled PT services to modify and progress therapeutic interventions, address functional mobility deficits, address ROM deficits, address strength deficits, analyze and address soft tissue restrictions, analyze and cue movement patterns, analyze and modify body mechanics/ergonomics, assess and modify postural abnormalities, address imbalance and instruct in home and community integration to attain remaining goals. []  See Plan of Care  []  See progress note/recertification  []  See Discharge Summary         Progress towards goals / Updated goals:  Short Term Goals: To be accomplished in 4 weeks:                 CMHXAQB will report compliance with HEP at least 1x/day to aid in rehabilitation program.                 Status at IE: Reviewed safety with JW SAVAGEYV: LCYZJMRM sit to stand mechanics and safety 7/27/2020                    Patient will decrease TUG by 10 seconds to display MCID and progression to decreased falls risk.                 Status at IE: 35 seconds                 Current: In-progress, 30 seconds 8/5/2020     Long Term Goals: To be accomplished in 8 weeks:                 UWHYBXB will increase strength to 4+/5 throughout B LEs to aid in return recreational activities and ADLs.                Status at IE: 4-/5                 Current:Same as IE                     Patient will improve Tinetti score to 18 to demonstrate decreased risk for falls.                 Status at IE: 9                 Current:Same as IE                    Patient will ambulate 1000ft on level surface with LRAD and minimal cues to clear right foot during gait cycle.                 Status at IE:2WW and drags right foot                 Current:Same as IE                    Patient will improve FOTO to 48 points overall to demonstrate improvement in functional ability.                  Status at IE:FOTO score = 41 (an established functional score where 100 = no disability)                 Current: Same as IE    PLAN  []  Upgrade activities as tolerated     [x]  Continue plan of care  []  Update interventions per flow sheet       []  Discharge due to:_  []  Other:_      Romel Hyde, PT, DPT 8/5/2020  11:12 AM    Future Appointments   Date Time Provider Caprice Leal   8/7/2020 11:00 AM Christina Huffman, PT [facility]   8/10/2020 11:00 AM Christina Huffman, PT [facility]   8/17/2020 11:00 AM Christina Huffman, PT [facility]   8/19/2020 11:00 AM Christina Huffman, PT [facility]   8/21/2020 11:00 AM Christina Huffman, PT [facility]   8/24/2020 11:00 AM Christina Huffman, PT [facility]   8/26/2020 11:00 AM Christina Huffman, PT [facility]   8/28/2020 11:00 AM Christina Huffman, PT [facility]

## 2020-08-07 ENCOUNTER — HOSPITAL ENCOUNTER (OUTPATIENT)
Dept: PHYSICAL THERAPY | Age: 80
Discharge: HOME | End: 2020-08-07
Payer: MEDICARE

## 2020-08-07 PROCEDURE — 97112 NEUROMUSCULAR REEDUCATION: CPT

## 2020-08-07 PROCEDURE — 97110 THERAPEUTIC EXERCISES: CPT

## 2020-08-07 NOTE — PROGRESS NOTES
PT DAILY TREATMENT NOTE - [facility]     Patient Name: Elli Lot  Date:2020  : 1940  [x]  Patient  Verified  Payor: [place] MEDICARE / Plan: [place] MEDICARE PART A & B / Product Type: Medicare /    In time:1055  Out [id number]  Total Treatment Time (min): 50  Total Timed Codes (min): 50   1:1 Treatment Time ( [address] only): 50   Visit #: 7 of 24    Treatment Area: Other abnormalities of gait and mobility [R26.89]  Right leg weakness [R29.898]  Left leg weakness [R29.898]    SUBJECTIVE  Pain Level (0-10 scale): 0  Any medication changes, allergies to medications, adverse drug reactions, diagnosis change, or new procedure performed?: [x] No    [] Yes (see summary sheet for update)  Subjective functional status/changes:   [] No changes reported    Patient reports that he was tired after last visit but believes that his gait is improving with exercise. OBJECTIVE    35 min Therapeutic Exercise:  [x] See flow sheet :   Rationale: increase ROM, increase strength and decrease pain to improve the patients ability to complete ADLs    15 min Neuromuscular Re-education:  [x]  See flow sheet :   Rationale: improve coordination, improve balance and increase proprioception  to improve the patients ability to complete ADLs         With   [x] TE   [] TA   [] neuro   [] other: Patient Education: [x] Review HEP    [] Progressed/Changed HEP based on:   [] positioning   [] body mechanics   [] transfers   [] heat/ice application    [] other:      Other Objective/Functional Measures: NA     Pain Level (0-10 scale) post treatment: 0    ASSESSMENT/Changes in Function: Patient responds well to treatment session. Patient is demonstrating improved tolerance to sit to stand requiring fewer recovery periods. He continues to required cues to reduce compensatory strategies with the exercise. Will advance exercise next visit.  No adverse effects were noted from today's treatment session      Patient will continue to benefit from skilled PT services to modify and progress therapeutic interventions, address functional mobility deficits, address ROM deficits, address strength deficits, analyze and address soft tissue restrictions, analyze and cue movement patterns, analyze and modify body mechanics/ergonomics, assess and modify postural abnormalities, address imbalance and instruct in home and community integration to attain remaining goals. []  See Plan of Care  []  See progress note/recertification  []  See Discharge Summary         Progress towards goals / Updated goals:  Short Term Goals: To be accomplished in 4 weeks:                 MFJTXEJ will report compliance with HEP at least 1x/day to aid in rehabilitation program.                 Status at IE: Reviewed safety with AD                 BALTA: JCLXGQMP sit to stand mechanics and safety 7/27/2020                    Patient will decrease TUG by 10 seconds to display MCID and progression to decreased falls risk.                 Status at IE: 35 seconds                 Current: In-progress, 30 seconds 8/5/2020     Long Term Goals: To be accomplished in 8 weeks:                 ZYQOBUA will increase strength to 4+/5 throughout B LEs to aid in return recreational activities and ADLs.                Status at IE: 4-/5                 Current:Same as IE                     Patient will improve Tinetti score to 18 to demonstrate decreased risk for falls.                 Status at IE: 9                 Current:Same as IE                    Patient will ambulate 1000ft on level surface with LRAD and minimal cues to clear right foot during gait cycle.                 Status at IE:2WW and drags right foot                 Current:Same as IE                    Patient will improve FOTO to 48 points overall to demonstrate improvement in functional ability.                Status at IE:FOTO score = 41 (an established functional score where 100 = no disability)                 Current:   In-progress 44 8/7/2020    PLAN  []  Upgrade activities as tolerated     [x]  Continue plan of care  []  Update interventions per flow sheet       []  Discharge due to:_  []  Other:_      Caprice Tejeda, PT, DPT 8/7/2020  10:44 AM    Future Appointments   Date Time Provider Caprice Leal   8/7/2020 11:00 AM Carthage Poles, PT [facility]   8/10/2020 11:00 AM Carthage Poles, PT [facility]   8/17/2020 11:00 AM Carthage Poles, PT [facility]   8/19/2020 11:00 AM Carthage Poles, PT [facility]   8/21/2020 11:00 AM Carthage Poles, PT [facility]   8/24/2020 11:00 AM Frieda Poles, PT [facility]   8/26/2020 11:00 AM Frieda Poles, PT [facility]   8/28/2020 11:00 AM Carthage Poles, PT [facility]   8/31/2020 11:00 AM Carthage Poles, PT [facility]   9/2/2020 11:00 AM Carthage Poles, PT [facility]

## 2020-08-10 ENCOUNTER — HOSPITAL ENCOUNTER (OUTPATIENT)
Dept: PHYSICAL THERAPY | Age: 80
Discharge: HOME | End: 2020-08-10
Payer: MEDICARE

## 2020-08-10 PROCEDURE — 97110 THERAPEUTIC EXERCISES: CPT

## 2020-08-10 PROCEDURE — 97112 NEUROMUSCULAR REEDUCATION: CPT

## 2020-08-10 NOTE — PROGRESS NOTES
PT DAILY TREATMENT NOTE - [facility]     Patient Name: Lakeisha Hurtado  Date:8/10/2020  : 1940  [x]  Patient  Verified  Payor: [employer] / Plan: [place] MEDICARE PART A & B / Product Type: Medicare /    In time:1100  Out time:1145  Total Treatment Time (min): 45  Total Timed Codes (min): 45   1:1 Treatment Time ( [address] only): 45   Visit #: 8 of 24    Treatment Area: Other abnormalities of gait and mobility [R26.89]  Right leg weakness [R29.898]  Left leg weakness [R29.898]    SUBJECTIVE  Pain Level (0-10 scale): 0  Any medication changes, allergies to medications, adverse drug reactions, diagnosis change, or new procedure performed?: [x] No    [] Yes (see summary sheet for update)  Subjective functional status/changes:   [] No changes reported    Patient reports that he is tired today. States that he has been having more     OBJECTIVE    30 min Therapeutic Exercise:  [x] See flow sheet :   Rationale: increase ROM, increase strength and decrease pain to improve the patients ability to complete ADLs     15 min Neuromuscular Re-education:  [x]  See flow sheet :   Rationale: improve coordination, improve balance and increase proprioception  to improve the patients ability to complete ADLs        With   [x] TE   [] TA   [] neuro   [] other: Patient Education: [x] Review HEP    [] Progressed/Changed HEP based on:   [] positioning   [] body mechanics   [] transfers   [] heat/ice application    [] other:      Other Objective/Functional Measures: Na     Pain Level (0-10 scale) post treatment: 0    ASSESSMENT/Changes in Function: Patient responds well to treatment session. Patient demonstrated improved exercise tolerance requiring fewer recovery periods. He required cues for sequencing with step activities. Patient relies heavily on bilateral UEs with the exercise.  No adverse effects were noted from today's treatment session    Patient will continue to benefit from skilled PT services to modify and progress therapeutic interventions, address functional mobility deficits, address ROM deficits, address strength deficits, analyze and address soft tissue restrictions, analyze and cue movement patterns, analyze and modify body mechanics/ergonomics, assess and modify postural abnormalities, address imbalance and instruct in home and community integration to attain remaining goals. []  See Plan of Care  []  See progress note/recertification  []  See Discharge Summary         Progress towards goals / Updated goals:  Short Term Goals: To be accomplished in 4 weeks:                 [facility] will [facility] compliance with HEP at least 1x/day to aid in rehabilitation program.                 Status at IE: Reviewed safety with AD                 Current:  In-progress developing consistency with HEP 8/10/2020                    Patient will decrease TUG by 10 seconds to display MCID and progression to decreased falls risk.                 Status at IE: 35 seconds                 Current:  In-progress, 30 seconds 8/5/2020     Long Term Goals: To be accomplished in 8 weeks:                 XOMLFKV will increase strength to 4+/5 throughout B LEs to aid in return recreational activities and ADLs.                Status at IE: 4-/5                 Current:Same as IE                     Patient will improve Tinetti score to 18 to demonstrate decreased risk for falls.                 Status at IE: 9                 Current:Same as IE                    Patient will ambulate 1000ft on level surface with LRAD and minimal cues to clear right foot during gait cycle.                 Status at IE:2WW and drags right foot                 Current:Same as IE                    Patient will improve FOTO to 48 points overall to demonstrate improvement in functional ability.                Status at IE:FOTO score = 41 (an established functional score where 100 = no disability)                 Current:   In-progress 44 8/7/2020    PLAN  []  Upgrade activities as tolerated     [x]  Continue plan of care  []  Update interventions per flow sheet       []  Discharge due to:_  []  Other:_      Edna Cook, PT, DPT 8/10/2020  11:15 AM    Future Appointments   Date Time Provider Caprice Leal   8/17/2020 11:00 AM AMADOR RileyTDEONDRE [facility]   8/19/2020 11:00 AM AMADOR Riley [facility]   8/21/2020 11:00 AM AMADOR RileyHPTDEONDRE [facility]   8/24/2020 11:00 AM AMADOR RileyHPTDEONDRE [facility]   8/26/2020 11:00 AM [facility]OR RileyHPTDEONDRE [facility]   8/28/2020 11:00 AM AMADOR RileyHPTDEONDRE [facility]   8/31/2020 11:00 AM AMADOR RileyHPTDEONDRE [facility]   9/2/2020 11:00 AM AMADOR RileyHPTDEONDRE [facility]

## 2020-08-12 ENCOUNTER — APPOINTMENT (OUTPATIENT)
Dept: PHYSICAL THERAPY | Age: 80
End: 2020-08-12
Payer: MEDICARE

## 2020-08-14 ENCOUNTER — HOSPITAL ENCOUNTER (OUTPATIENT)
Dept: PHYSICAL THERAPY | Age: 80
End: 2020-08-14
Payer: MEDICARE

## 2020-08-17 ENCOUNTER — HOSPITAL ENCOUNTER (OUTPATIENT)
Dept: PHYSICAL THERAPY | Age: 80
Discharge: HOME | End: 2020-08-17
Payer: MEDICARE

## 2020-08-17 PROCEDURE — 97110 THERAPEUTIC EXERCISES: CPT

## 2020-08-17 PROCEDURE — 97112 NEUROMUSCULAR REEDUCATION: CPT

## 2020-08-17 NOTE — PROGRESS NOTES
PT DAILY TREATMENT NOTE - [facility]     Patient Name: Elli Cox  TWSD:  : 1940  [x]  Patient  Verified  Payor: Esmer Marino / Plan: [place] & B / Product Type: Medicare /    In time:1105  Out time:1150  Total Treatment Time (min): 45  Total Timed Codes (min): 45   1:1 Treatment Time ( [address] only): 45   Visit #: 9 of 24    Treatment Area: Other abnormalities of gait and mobility [R26.89]  Right leg weakness [R29.898]  Left leg weakness [R29.898]    SUBJECTIVE  Pain Level (0-10 scale): 0  Any medication changes, allergies to medications, adverse drug reactions, diagnosis change, or new procedure performed?: [x] No    [] Yes (see summary sheet for update)  Subjective functional status/changes:   [] No changes reported    Patient reports that he has been more consistent with HEP. OBJECTIVE    30 min Therapeutic Exercise:  [x] See flow sheet :   Rationale: increase ROM, increase strength and decrease pain to improve the patients ability to complete ADLs  15 min Neuromuscular Re-education:  [x]? See flow sheet :   Rationale: improve coordination, improve balance and increase proprioception  to improve the patients ability to complete ADLs          With   [x] TE   [] TA   [] neuro   [] other: Patient Education: [x] Review HEP    [] Progressed/Changed HEP based on:   [] positioning   [] body mechanics   [] transfers   [] heat/ice application    [] other:      Other Objective/Functional Measures: NA     Pain Level (0-10 scale) post treatment: 0    ASSESSMENT/Changes in Function: Patient responds well to treatment session. Provide CGA with sit to stand and marching exercise. Patient required more frequent recovery periods today. He demonstrated improved control with cone exercise. No adverse effects were noted from today's treatment session.     Patient will continue to benefit from skilled PT services to modify and progress therapeutic interventions, address functional mobility deficits, address ROM deficits, address strength deficits, analyze and address soft tissue restrictions, analyze and cue movement patterns, analyze and modify body mechanics/ergonomics, assess and modify postural abnormalities, address imbalance and instruct in home and community integration to attain remaining goals. []  See Plan of Care  []  See progress note/recertification  []  See Discharge Summary         Progress towards goals / Updated goals:   Short Term Goals: To be accomplished in 4 weeks:                 OYSSBXX will report compliance with HEP at least 1x/day to aid in rehabilitation program.                 Status at IE: Reviewed safety with AD                 Current:  In-progress developing consistency with HEP 8/10/2020                    Patient will decrease TUG by 10 seconds to display MCID and progression to decreased falls risk.                 Status at IE: 35 seconds                 Current:  In-progress, 30 seconds 8/5/2020     Long Term Goals: To be accomplished in 8 weeks:                 BRVHPEC will increase strength to 4+/5 throughout B LEs to aid in return recreational activities and ADLs.                Status at IE: 4-/5                 Current:Same as IE                     Patient will improve Tinetti score to 18 to demonstrate decreased risk for falls.                 Status at IE: 9                 Current:Same as IE                    Patient will ambulate 1000ft on level surface with LRAD and minimal cues to clear right foot during gait cycle.                 Status at IE:2WW and drags right foot                 Current:Same as IE                    Patient will improve FOTO to 48 points overall to demonstrate improvement in functional ability.                  Status at IE:FOTO score = 41 (an established functional score where 100 = no disability)                 Current:  In-progress 44 8/7/2020    PLAN  []  Upgrade activities as tolerated     [x]  Continue plan of care  []  Update interventions per flow sheet       []  Discharge due to:_  []  Other:_      Magali Duke, PT, DPT 8/17/2020  10:43 AM    Future Appointments   Date Time Provider Caprice Leal   8/17/2020 11:00 AM Sivan Olmos, PT [facility]   8/19/2020 11:00 AM Sivan Olmos, PT [facility]   8/21/2020 11:00 AM Sivan Olmos, PT [facility]   8/24/2020 11:00 AM Sivan Olmos, PT [facility]   8/26/2020 11:00 AM Sivan , PT [facility]   8/28/2020 11:00 AM Sivan Olmos, PT [facility]   8/31/2020 11:00 AM Sivan Olmos, PT [facility]   9/2/2020 11:00 AM Sivan , PT [facility]

## 2020-08-19 ENCOUNTER — HOSPITAL ENCOUNTER (OUTPATIENT)
Dept: PHYSICAL THERAPY | Age: 80
Discharge: HOME | End: 2020-08-19
Payer: MEDICARE

## 2020-08-19 PROCEDURE — 97110 THERAPEUTIC EXERCISES: CPT

## 2020-08-19 NOTE — PROGRESS NOTES
[facility] at [address]: [phone]   Fax: [phone]  Progress Note  Patient Name: Tory Fernández : 1940   Medical   Diagnosis: Other abnormalities of gait and mobility [R26.89]  Right leg weakness [R29.898]  Left leg weakness [R29.898] Treatment Diagnosis: Other abnormalities of gait and mobility [R26.89]  Right leg weakness [R29.898]  Left leg weakness [R29.898]   Onset Date: Chronic     Referral Source: Sergey Watt NP Start of [facility]): 2020   Prior Hospitalization: See medical history Provider #: [id number]   Prior Level of Function: Sedentary, 2WW for ambulation   Comorbidities: High Blood Pressure, High Cholesterol, Hx TIA, OA, Thyroidism, TBI   Medications: Verified on Patient Summary List      ===========================================================================================  Assessment / Summary of Care:  Tory Fernández is a 70 y.o.  male with  c/o difficulty walking, decreased balance and reduced activity tolerance. Patient is improving as he progressing toward his goals. He is developing consistency with HEP activities. He demonstrating improved safety awareness transfers and use of AD. Patient continues to have decreased activity tolerance and poor gait mechanics and is a risk for falls. Will continue to focus on these deficits.  Patient will continue to benefit from skilled PT services to modify and progress therapeutic interventions, address functional mobility deficits, address ROM deficits, address strength deficits, analyze and address soft tissue restrictions, analyze and cue movement patterns, analyze and modify body mechanics/ergonomics, assess and modify postural abnormalities, address imbalance and instruct in home and community integration to attain remaining goals.     ===========================================================================================    Plan:Continue therapy per initial plan/protocol at a frequency of  2 x per week for 6 weeks    Goals:   Short Term Goals: To be accomplished in 4 weeks:                 HXINBRV will report compliance with HEP at least 1x/day to aid in rehabilitation program.                 Status at IE: Reviewed safety with AD                 Current:  In-progress developing consistency with HEP 8/10/2020                    Patient will decrease TUG by 10 seconds to display MCID and progression to decreased falls risk.                 Status at IE: 35 seconds                 Current:  In-progress, 31 seconds 8/19/2020     Long Term Goals: To be accomplished in 8 weeks:                 EZBYENJ will increase strength to 4+/5 throughout B LEs to aid in return recreational activities and ADLs.                Status at IE: 4-/5                 Current: In-progress, 4/5 8/19/2020                    Patient will improve Tinetti score to 18 to demonstrate decreased risk for falls.                 Status at IE: 9                 Current:Same as IE                    Patient will ambulate 1000ft on level surface with LRAD and minimal cues to clear right foot during gait cycle.                 Status at IE:2WW and drags right foot                 Current: In-progress, 200 feet with 2WW and close supervision 8/19/2020                    Patient will improve FOTO to 48 points overall to demonstrate improvement in functional ability.                Status at IE:FOTO score = 41 (an established functional score where 100 = no disability)                 Current:  In-progress 44 8/7/2020       ===========================================================================================  Subjective: Patient reports compliance with HEP.     Objective:   FOTO 44  MMT 4/5  TUG 31     Therapist Signature: Marquez Kessler PT, DPT Date: 4/19/9733   Re-Certification:  Time: 11:55 AM         [facility] at [facility] [address]   Phone: [phone]   Fax: [phone]

## 2020-08-21 ENCOUNTER — HOSPITAL ENCOUNTER (OUTPATIENT)
Dept: PHYSICAL THERAPY | Age: 80
Discharge: HOME | End: 2020-08-21
Payer: MEDICARE

## 2020-08-21 PROCEDURE — 97112 NEUROMUSCULAR REEDUCATION: CPT

## 2020-08-21 PROCEDURE — 97110 THERAPEUTIC EXERCISES: CPT

## 2020-08-21 NOTE — PROGRESS NOTES
PT DAILY TREATMENT NOTE - [facility]     Patient Name: Erik Westbrook  Date:2020  : 1940  [x]  Patient  Verified  Payor: Terry Izquierdo / Plan: [place] MED[place] PART A & B / Product Type: Medicare /    In time:1100  Out time:1145  Total Treatment Time (min): 45  Total Timed Codes (min): 45   1:1 Treatment Time ( [address] only): 45   Visit #: 10 of 24    Treatment Area: Other abnormalities of gait and mobility [R26.89]  Right leg weakness [R29.898]  Left leg weakness [R29.898]    SUBJECTIVE  Pain Level (0-10 scale): 0  Any medication changes, allergies to medications, adverse drug reactions, diagnosis change, or new procedure performed?: [x] No    [] Yes (see summary sheet for update)  Subjective functional status/changes:   [] No changes reported  Patient reports no new changes since last visit. OBJECTIVE    37 min Therapeutic Exercise:  [x] See flow sheet :   Rationale: increase ROM, increase strength and decrease pain to improve the patients ability to complete ADLs     8 min Neuromuscular Re-education:  [x]  See flow sheet :   Rationale: improve coordination, improve balance and increase proprioception  to improve the patients ability to complete ADLs          With   [x] TE   [] TA   [] neuro   [] other: Patient Education: [x] Review HEP    [] Progressed/Changed HEP based on:   [] positioning   [] body mechanics   [] transfers   [] heat/ice application    [] other:      Other Objective/Functional Measures: NA     Pain Level (0-10 scale) post treatment: 0    ASSESSMENT/Changes in Function: Patient responds well to treatment session. Patient continues to require UEA and CGA with STS exercise for safety. He demonstrated improved tolerance to the exercise and was able to perform the activity from a lower seat height. He was significantly challenged with hip abduction in weight bearing. No adverse effects were noted from today's treatment session.     Patient will continue to benefit from skilled PT services to modify and progress therapeutic interventions, address functional mobility deficits, address ROM deficits, address strength deficits, analyze and address soft tissue restrictions, analyze and cue movement patterns, analyze and modify body mechanics/ergonomics, assess and modify postural abnormalities, address imbalance and instruct in home and community integration to attain remaining goals. []  See Plan of Care  []  See progress note/recertification  []  See Discharge Summary         Progress towards goals / Updated goals:  Short Term Goals: To be accomplished in 4 weeks:                 QFJIMJO will report compliance with HEP at least 1x/day to aid in rehabilitation program.                 Status at IE: Reviewed safety with AD                 Current:  In-progress developing consistency with HEP 8/10/2020                    Patient will decrease TUG by 10 seconds to display MCID and progression to decreased falls risk.                 Status at IE: 35 seconds                 Current:  In-progress, 31 seconds 8/19/2020     Long Term Goals: To be accomplished in 8 weeks:                 FNKZLAD will increase strength to 4+/5 throughout B LEs to aid in return recreational activities and ADLs.                Status at IE: 4-/5                 Current: In-progress, 4/5 8/19/2020                    Patient will improve Tinetti score to 18 to demonstrate decreased risk for falls.                 Status at IE: 9                 Current:Same as IE                    Patient will ambulate 1000ft on level surface with LRAD and minimal cues to clear right foot during gait cycle.                 Status at IE:2WW and drags right foot                 Current: In-progress, 200 feet with 2WW and close supervision 8/19/2020                    Patient will improve FOTO to 48 points overall to demonstrate improvement in functional ability.                  Status at IE:FOTO score = 41 (an established functional score where 100 = no disability)                 Current:  In-progress 44 8/7/2020    PLAN  []  Upgrade activities as tolerated     [x]  Continue plan of care  []  Update interventions per flow sheet       []  Discharge due to:_  []  Other:_      Ady Dey PT, DPT 8/21/2020  11:15 AM    Future Appointments   Date Time Provider Caprice Leal   8/24/2020 11:00 AM Elizabeth Socks, PT [facility]   8/26/2020 11:00 AM Elizabeth Socks, PT [facility]   8/28/2020 11:00 AM Elizabeth Socks, PT [facility]   8/31/2020 11:00 AM Elizabeth Socks, PT [facility]   9/2/2020 11:00 AM Elizabeth Socks, PT [facility]

## 2020-08-24 ENCOUNTER — HOSPITAL ENCOUNTER (OUTPATIENT)
Dept: PHYSICAL THERAPY | Age: 80
Discharge: HOME | End: 2020-08-24
Payer: MEDICARE

## 2020-08-24 PROCEDURE — 97110 THERAPEUTIC EXERCISES: CPT

## 2020-08-24 NOTE — PROGRESS NOTES
PT DAILY TREATMENT NOTE - [facility]     Patient Name: Hollie Eisenmenger  Date:2020  : 1940  [x]  Patient  Verified  Payor: Gabriel Hind / Plan: [place] MEDICARE PART A & B / Product Type: Medicare /    In time:1100  Out time:1145   Total Treatment Time (min): 45  Total Timed Codes (min): 45   1:1 Treatment Time ( [address] only): 45   Visit #: 11 of 24    Treatment Area: Other abnormalities of gait and mobility [R26.89]  Right leg weakness [R29.898]  Left leg weakness [R29.898]    SUBJECTIVE  Pain Level (0-10 scale): 0  Any medication changes, allergies to medications, adverse drug reactions, diagnosis change, or new procedure performed?: [x] No    [] Yes (see summary sheet for update)  Subjective functional status/changes:   [] No changes reported  Patient reports that he is compliant with HEP. OBJECTIVE    45 min Therapeutic Exercise:  [x] See flow sheet :   Rationale: increase ROM, increase strength and decrease pain to improve the patients ability to complete ADLs       With   [x] TE   [] TA   [] neuro   [] other: Patient Education: [x] Review HEP    [] Progressed/Changed HEP based on:   [] positioning   [] body mechanics   [] transfers   [] heat/ice application    [] other:      Other Objective/Functional Measures: NA     Pain Level (0-10 scale) post treatment: 0    ASSESSMENT/Changes in Function: Patient responds well to treatment session. Provided CGA with weight bearing activities. Progressed patient by increasing repetitions and reducing recovery periods.  No adverse effects were noted from today's treatment session    Patient will continue to benefit from skilled PT services to modify and progress therapeutic interventions, address functional mobility deficits, address ROM deficits, address strength deficits, analyze and address soft tissue restrictions, analyze and cue movement patterns, analyze and modify body mechanics/ergonomics, assess and modify postural abnormalities, address imbalance and instruct in home and community integration to attain remaining goals. []  See Plan of Care  []  See progress note/recertification  []  See Discharge Summary         Progress towards goals / Updated goals:  Short Term Goals: To be accomplished in 4 weeks:                 LCBGBRA will report compliance with HEP at least 1x/day to aid in rehabilitation program.                 Status at IE: Reviewed safety with AD                 Current:  In-progress developing consistency with HEP 8/10/2020                    Patient will decrease TUG by 10 seconds to display MCID and progression to decreased falls risk.                 Status at IE: 35 seconds                 Current:  In-progress, 31 seconds 8/19/2020     Long Term Goals: To be accomplished in 8 weeks:                 VNVDAEG will increase strength to 4+/5 throughout B LEs to aid in return recreational activities and ADLs.                Status at IE: 4-/5                 Current: In-progress, 4/5 8/19/2020                    Patient will improve Tinetti score to 18 to demonstrate decreased risk for falls.                 Status at IE: 9                 Current:Same as IE                    Patient will ambulate 1000ft on level surface with LRAD and minimal cues to clear right foot during gait cycle.                 Status at IE:2WW and drags right foot                 Current: In-progress, 200 feet with 2WW and close supervision 8/19/2020                    Patient will improve FOTO to 48 points overall to demonstrate improvement in functional ability.                  Status at IE:FOTO score = 41 (an established functional score where 100 = no disability)                 Current:  In-progress 45 8/24/2020    PLAN  []  Upgrade activities as tolerated     [x]  Continue plan of care  []  Update interventions per flow sheet       []  Discharge due to:_  []  Other:_      March Bienvenido PT, DPT 8/24/2020  11:02 AM    Future Appointments   Date Time Provider Caprice Mel   8/26/2020 11:00 AM AMADOR Sgeal [facility]   8/28/2020 11:00 AM AMADOR Segal [facility]   8/31/2020 11:00 AM AMADOR Segal [facility]   9/2/2020 11:00 AM AMADOR Segal [facility]

## 2020-08-26 ENCOUNTER — HOSPITAL ENCOUNTER (OUTPATIENT)
Dept: PHYSICAL THERAPY | Age: 80
Discharge: HOME | End: 2020-08-26
Payer: MEDICARE

## 2020-08-26 PROCEDURE — 97110 THERAPEUTIC EXERCISES: CPT

## 2020-08-26 PROCEDURE — 97116 GAIT TRAINING THERAPY: CPT

## 2020-08-26 NOTE — PROGRESS NOTES
PT DAILY TREATMENT NOTE - [facility]     Patient Name: Maribel Leonardo  Date:2020  : 1940  [x]  Patient  Verified  Payor: Augustina Savage / Plan: [place] MEDICARE PART A & B / Product Type: Medicare /    In time:1100  Out time:1145  Total Treatment Time (min): 45  Total Timed Codes (min): 45   1:1 Treatment Time ([facility] only): 45   Visit #: 13 of 24    Treatment Area: Other abnormalities of gait and mobility [R26.89]  Right leg weakness [R29.898]  Left leg weakness [R29.898]    SUBJECTIVE  Pain Level (0-10 scale): 0  Any medication changes, allergies to medications, adverse drug reactions, diagnosis change, or new procedure performed?: [x] No    [] Yes (see summary sheet for update)  Subjective functional status/changes:   [] No changes reported    Patient reports no new changes since last visit. OBJECTIVE    30 min Therapeutic Exercise:  [x] See flow sheet :   Rationale: increase ROM, increase strength and decrease pain to improve the patients ability to complete ADLs      15 min Gait Training:  160 feet with 2WW device on level surfaces with gait belt and contact guard level of assist   Rationale: With   [x] TE   [] TA   [] neuro   [] other: Patient Education: [x] Review HEP    [] Progressed/Changed HEP based on:   [] positioning   [] body mechanics   [] transfers   [] heat/ice application    [] other:      Other Objective/Functional Measures: NA     Pain Level (0-10 scale) post treatment: 0    ASSESSMENT/Changes in Function: Patient responds well to treatment session. Provided cues to  right foot during gait sequence as patient has a tendency to drag the right foot. He ambulated with improved safety with steady cues to reduce dragging his feet. He has difficulty negotiating turns will continue to improve gait and safety in future visits.  No adverse effects were noted from today's treatment session    Patient will continue to benefit from skilled PT services to modify and progress therapeutic interventions, address functional mobility deficits, address ROM deficits, address strength deficits, analyze and address soft tissue restrictions, analyze and cue movement patterns, analyze and modify body mechanics/ergonomics, assess and modify postural abnormalities, and instruct in home and community integration to attain remaining goals. []  See Plan of Care  []  See progress note/recertification  []  See Discharge Summary         Progress towards goals / Updated goals:  Short Term Goals: To be accomplished in 4 weeks:                 XLCTWCH will report compliance with HEP at least 1x/day to aid in rehabilitation program.                 Status at IE: Reviewed safety with AD                 Current:  In-progress developing consistency with HEP 8/10/2020                    Patient will decrease TUG by 10 seconds to display MCID and progression to decreased falls risk.                 Status at IE: 35 seconds                 Current:  In-progress, 31 seconds 8/19/2020     Long Term Goals: To be accomplished in 8 weeks:                 SJSNWJB will increase strength to 4+/5 throughout B LEs to aid in return recreational activities and ADLs.                Status at IE: 4-/5                 Current: In-progress, 4/5 8/19/2020                    Patient will improve Tinetti score to 18 to demonstrate decreased risk for falls.                 Status at IE: 9                 Current:Same as IE                    Patient will ambulate 1000ft on level surface with LRAD and minimal cues to clear right foot during gait cycle.                 Status at IE:2WW and drags right foot                 Current: In-progress, 200 feet with 2WW and close supervision 8/19/2020                    Patient will improve FOTO to 48 points overall to demonstrate improvement in functional ability.                  Status at IE:FOTO score = 41 (an established functional score where 100 = no disability)                 Current:  In-progress 45 8/24/2020    PLAN  []  Upgrade activities as tolerated     []  Continue plan of care  []  Update interventions per flow sheet       []  Discharge due to:_  []  Other:_      Les Koroma, PT, DPT 8/26/2020  11:07 AM    Future Appointments   Date Time Provider Caprice Leal   8/28/2020 11:00 AM AMADOR Acosta [facility]   8/31/2020 11:00 AM AMADOR Acosta [facility]   9/2/2020 11:00 AM AMADOR Acosta [facility]

## 2020-08-28 ENCOUNTER — HOSPITAL ENCOUNTER (OUTPATIENT)
Dept: PHYSICAL THERAPY | Age: 80
Discharge: HOME | End: 2020-08-28
Payer: MEDICARE

## 2020-08-28 PROCEDURE — 97110 THERAPEUTIC EXERCISES: CPT

## 2020-08-28 NOTE — PROGRESS NOTES
PT DAILY TREATMENT NOTE - [facility]     Patient Name: Erik Westbrook  Date:2020  : 1940  [x]  Patient  Verified  Payor: Terry Izquierdo / Plan: [place] MED[place] & B / Product Type: Medicare /    In time:1100  Out time:1145  Total Treatment Time (min): 45  Total Timed Codes (min): 45   1:1 Treatment Time ([facility] only): 45   Visit #: 14 of 24    Treatment Area: Other abnormalities of gait and mobility [R26.89]  Right leg weakness [R29.898]  Left leg weakness [R29.898]    SUBJECTIVE  Pain Level (0-10 scale): 0  Any medication changes, allergies to medications, adverse drug reactions, diagnosis change, or new procedure performed?: [x] No    [] Yes (see summary sheet for update)  Subjective functional status/changes:   [] No changes reported  Patient reports that he is doing well and is feeling stronger. OBJECTIVE      45 min Therapeutic Exercise:  [x] See flow sheet :   Rationale: increase ROM, increase strength and decrease pain to improve the patients ability to complete ADLs        With   [x] TE   [] TA   [] neuro   [] other: Patient Education: [x] Review HEP    [] Progressed/Changed HEP based on:   [] positioning   [] body mechanics   [] transfers   [] heat/ice application    [] other:      Other Objective/Functional Measures: NA     Pain Level (0-10 scale) post treatment: 0    ASSESSMENT/Changes in Function: Patient responds well to treatment session. Patient was challenged with exericse today as he seems to have reduced activity tolerance on the 3rd day of treament in the week. He required to focus on performing eccentric control with sit to stand. No adverse effects were noted from today's treatment session.         []  See Plan of Care  []  See progress note/recertification  []  See Discharge Summary         Progress towards goals / Updated goals:  Short Term Goals: To be accomplished in 4 weeks:                 ANJALIQ will report compliance with HEP at least 1x/day to aid in rehabilitation program.                 Status at IE: Reviewed safety with AD                 Current:  In-progress developing consistency with HEP 8/10/2020                    Patient will decrease TUG by 10 seconds to display MCID and progression to decreased falls risk.                 Status at IE: 35 seconds                 Current:  In-progress, 31 seconds 8/19/2020     Long Term Goals: To be accomplished in 8 weeks:                 FMIHPIZ will increase strength to 4+/5 throughout B LEs to aid in return recreational activities and ADLs.                Status at IE: 4-/5                 Current: In-progress, 4/5 8/19/2020                    Patient will improve Tinetti score to 18 to demonstrate decreased risk for falls.                 Status at IE: 9                 Current:Same as IE                    Patient will ambulate 1000ft on level surface with LRAD and minimal cues to clear right foot during gait cycle.                 Status at IE:2WW and drags right foot                 Current: In-progress, 200 feet with 2WW and close supervision 8/19/2020                    Patient will improve FOTO to 48 points overall to demonstrate improvement in functional ability.                  Status at IE:FOTO score = 41 (an established functional score where 100 = no disability)                 Current:  In-progress 45 8/24/2020    PLAN  []  Upgrade activities as tolerated     [x]  Continue plan of care  []  Update interventions per flow sheet       []  Discharge due to:_  []  Other:_      Jayden Cunningham PT, DPT 8/28/2020  11:06 AM    Future Appointments   Date Time Provider Caprice Leal   8/31/2020 11:00 AM AMADOR Segal [facility]   9/2/2020 11:00 AM AMADOR Segal [facility]

## 2020-08-31 ENCOUNTER — HOSPITAL ENCOUNTER (OUTPATIENT)
Dept: PHYSICAL THERAPY | Age: 80
Discharge: HOME | End: 2020-08-31
Payer: MEDICARE

## 2020-08-31 PROCEDURE — 97110 THERAPEUTIC EXERCISES: CPT

## 2020-08-31 NOTE — PROGRESS NOTES
PT DAILY TREATMENT NOTE - [facility]     Patient Name: Xenia Marrero  Date:2020  : 1940  [x]  Patient  Verified  Payor: Som Maldonado / Plan: [place] MEDICARE PART A & B / Product Type: Medicare /    In time:1055  Out time:1145  Total Treatment Time (min): 50  Total Timed Codes (min): 50   1:1 Treatment Time ([facility] only): 50   Visit #: 15 of 24    Treatment Area: Other abnormalities of gait and mobility [R26.89]  Right leg weakness [R29.898]  Left leg weakness [R29.898]    SUBJECTIVE  Pain Level (0-10 scale): 0  Any medication changes, allergies to medications, adverse drug reactions, diagnosis change, or new procedure performed?: [x] No    [] Yes (see summary sheet for update)  Subjective functional status/changes:   [] No changes reported    Patient reports no new changes over the weekend. OBJECTIVE    50 min Therapeutic Exercise:  [x] See flow sheet :   Rationale: increase ROM, increase strength and decrease pain to improve the patients ability to complete ADLs    With   [x] TE   [] TA   [] neuro   [] other: Patient Education: [x] Review HEP    [] Progressed/Changed HEP based on:   [] positioning   [] body mechanics   [] transfers   [] heat/ice application    [] other:      Other Objective/Functional Measures: NA     Pain Level (0-10 scale) post treatment: 0    ASSESSMENT/Changes in Function: Patient responds well to treatment session. Patient tired rapidly with cone tap exercise requiring a seated rest break. He required encouragement through out treatment to participate in exercise activities. He stated that he would like to walk without his walker and is frustrated. Discussed safety with ambulation and that a walker is the safest AD for him at this time. He demonstrated understanding. Will provide advanced HEP next visit.  No adverse effects were noted from today's treatment session    Patient will continue to benefit from skilled PT services to modify and progress therapeutic interventions, address functional mobility deficits, address ROM deficits, address strength deficits, analyze and address soft tissue restrictions, analyze and cue movement patterns, analyze and modify body mechanics/ergonomics, assess and modify postural abnormalities, address imbalance and instruct in home and community integration to attain remaining goals. []  See Plan of Care  []  See progress note/recertification  []  See Discharge Summary         Progress towards goals / Updated goals:  Short Term Goals: To be accomplished in 4 weeks:                 HEIUDBN will report compliance with HEP at least 1x/day to aid in rehabilitation program.                 Status at IE: Reviewed safety with AD                 Current:  In-progress developing consistency with HEP 8/10/2020                    Patient will decrease TUG by 10 seconds to display MCID and progression to decreased falls risk.                 Status at IE: 35 seconds                 Current:  In-progress, 31 seconds 8/19/2020     Long Term Goals: To be accomplished in 8 weeks:                 QJQIIPO will increase strength to 4+/5 throughout B LEs to aid in return recreational activities and ADLs.                Status at IE: 4-/5                 Current: In-progress, 4/5 8/19/2020                    Patient will improve Tinetti score to 18 to demonstrate decreased risk for falls.                 Status at IE: 9                 Current:Same as IE                    Patient will ambulate 1000ft on level surface with LRAD and minimal cues to clear right foot during gait cycle.                 Status at IE:2WW and drags right foot                 Current: In-progress, 200 feet with 2WW and close supervision 8/19/2020                    Patient will improve FOTO to 48 points overall to demonstrate improvement in functional ability.                  Status at IE:FOTO score = 41 (an established functional score where 100 = no disability)                 Current:  In-progress 45 8/24/2020    PLAN  []  Upgrade activities as tolerated     [x]  Continue plan of care  []  Update interventions per flow sheet       []  Discharge due to:_  []  Other:_      Karthik Murillo, PT, DPT 8/31/2020  11:10 AM    Future Appointments   Date Time Provider Caprice Leal   9/2/2020 11:00 AM Lou Parker[facility]

## 2020-09-02 ENCOUNTER — HOSPITAL ENCOUNTER (OUTPATIENT)
Dept: PHYSICAL THERAPY | Age: 80
Discharge: HOME | End: 2020-09-02
Payer: MEDICARE

## 2020-09-02 PROCEDURE — 97110 THERAPEUTIC EXERCISES: CPT

## 2020-09-02 NOTE — PROGRESS NOTES
PT DAILY TREATMENT NOTE - [facility]     Patient Name: Jer Costa  Date:2020  : 1940  [x]  Patient  Verified  Payor: [place] MEDICARE / Plan: [place] MEDICARE PART A & B / Product Type: Medicare /    In time:1100  Out time:1145  Total Treatment Time (min): 45  Total Timed Codes (min): 45   1:1 Treatment Time ( [address] only): 45   Visit #: 16 of 24    Treatment Area: Other abnormalities of gait and mobility [R26.89]  Right leg weakness [R29.898]  Left leg weakness [R29.898]    SUBJECTIVE  Pain Level (0-10 scale): 0  Any medication changes, allergies to medications, adverse drug reactions, diagnosis change, or new procedure performed?: [x] No    [] Yes (see summary sheet for update)  Subjective functional status/changes:   [] No changes reported  Patient reports that he would like to continue therapy as it is helping him get around his home safer. OBJECTIVE    45 min Therapeutic Exercise:  [x] See flow sheet :   Rationale: increase ROM, increase strength and decrease pain to improve the patients ability to complete ADLs          With   [x] TE   [] TA   [] neuro   [] other: Patient Education: [x] Review HEP    [] Progressed/Changed HEP based on:   [] positioning   [] body mechanics   [] transfers   [] heat/ice application    [] other:      Other Objective/Functional Measures: NA     Pain Level (0-10 scale) post treatment: 0    ASSESSMENT/Changes in Function: Patient responds well to treatment session. Advanced exercise by challenging weight bearing tolerance with 3 cone tap. Provided CGA for safety as patient has reduced balance with dynamic activity.  No adverse effects were noted from today's treatment session    Patient will continue to benefit from skilled PT services to modify and progress therapeutic interventions, address functional mobility deficits, address ROM deficits, address strength deficits, analyze and address soft tissue restrictions, analyze and cue movement patterns, analyze and modify body mechanics/ergonomics, assess and modify postural abnormalities, address imbalance and instruct in home and community integration to attain remaining goals. []  See Plan of Care  []  See progress note/recertification  []  See Discharge Summary         Progress towards goals / Updated goals:  Short Term Goals: To be accomplished in 4 weeks:                 OVQXLJV will report compliance with HEP at least 1x/day to aid in rehabilitation program.                 Status at IE: Reviewed safety with AD                 Current:  In-progress developing consistency with HEP 8/10/2020                    Patient will decrease TUG by 10 seconds to display MCID and progression to decreased falls risk.                 Status at IE: 35 seconds                 Current:  In-progress, 31 seconds 8/19/2020     Long Term Goals: To be accomplished in 8 weeks:                 ZLIAFCW will increase strength to 4+/5 throughout B LEs to aid in return recreational activities and ADLs.                Status at IE: 4-/5                 Current: In-progress, 4/5 8/19/2020                    Patient will improve Tinetti score to 18 to demonstrate decreased risk for falls.                 Status at IE: 9                 Current:Same as IE                    Patient will ambulate 1000ft on level surface with LRAD and minimal cues to clear right foot during gait cycle.                 Status at IE:2WW and drags right foot                 Current: In-progress, 200 feet with 2WW and close supervision 8/19/2020                    Patient will improve FOTO to 48 points overall to demonstrate improvement in functional ability.                  Status at IE:FOTO score = 41 (an established functional score where 100 = no disability)                 Current:  In-progress 45 8/24/2020    PLAN  []  Upgrade activities as tolerated     [x]  Continue plan of care  []  Update interventions per flow sheet       []  Discharge due to:_  []  Other:Aryan Ortega Yadi Michaud, PT, DPT 9/2/2020  11:00 AM    No future appointments.

## 2020-09-16 ENCOUNTER — APPOINTMENT (OUTPATIENT)
Dept: PHYSICAL THERAPY | Age: 80
End: 2020-09-16
Payer: MEDICARE

## 2020-09-22 ENCOUNTER — APPOINTMENT (OUTPATIENT)
Dept: PHYSICAL THERAPY | Age: 80
End: 2020-09-22
Payer: MEDICARE

## 2020-09-25 ENCOUNTER — HOSPITAL ENCOUNTER (OUTPATIENT)
Dept: PHYSICAL THERAPY | Age: 80
End: 2020-09-25
Payer: MEDICARE

## 2020-09-29 ENCOUNTER — HOSPITAL ENCOUNTER (OUTPATIENT)
Dept: PHYSICAL THERAPY | Age: 80
Discharge: HOME | End: 2020-09-29
Payer: MEDICARE

## 2020-09-29 PROCEDURE — 97110 THERAPEUTIC EXERCISES: CPT

## 2020-09-29 NOTE — PROGRESS NOTES
[facility] at [address]: [phone]   Fax: [phone]  Progress Note  Patient Name: Juanell Klinefelter : 1940   Medical   Diagnosis: Other abnormalities of gait and mobility [R26.89]  Right leg weakness [R29.898]  Left leg weakness [R29.898] Treatment Diagnosis: Other abnormalities of gait and mobility [R26.89]  Right leg weakness [R29.898]  Left leg weakness [R29.898]   Onset Date: Chronic     Referral Source: George Bucio NP Start of Care [facility]): 2020   Prior Hospitalization: See medical history Provider #: [id number]   Prior Level of Function: Sedentary, 2WW for ambulation   Comorbidities: High Blood Pressure, High Cholesterol, Hx TIA, OA, Thyroidism, TBI   Medications: Verified on Patient Summary List      ===========================================================================================  Assessment / Summary of Care:  Juanell Klinefelter is a 70 y.o. male with  c/o difficulty walking, decreased balance and reduced activity tolerance. Patient returns after a layoff from physical therapy due to family responsibilities. Improvement since last progress note as been minimal as a result of absence. Will focus on developing consistency with exercise in future visits as patient transitions toward family supervised exercise.   Patient will continue to benefit from skilled PT services to modify and progress therapeutic interventions, address functional mobility deficits, address ROM deficits, address strength deficits, analyze and address soft tissue restrictions, analyze and cue movement patterns, analyze and modify body mechanics/ergonomics, assess and modify postural abnormalities, address imbalance and instruct in home and community integration to attain remaining goals.      ===========================================================================================    Plan:Continue therapy per initial plan/protocol at a frequency of  2 x per week for 3 weeks    Goals:   Short Term Goals: To be accomplished in 4 weeks:                 AQCMGLH will report compliance with HEP at least 1x/day to aid in rehabilitation program.                 Status at IE: Reviewed safety with AD                 Current:  In-progress developing consistency with HEP 9/29/2020                    Patient will decrease TUG by 10 seconds to display MCID and progression to decreased falls risk.                 Status at IE: 35 seconds                 Current:  In-progress, 32 seconds 8/29/2020     Long Term Goals: To be accomplished in 8 weeks:                 DLSKDII will increase strength to 4+/5 throughout B LEs to aid in return recreational activities and ADLs.                Status at IE: 4-/5                 Current: In-progress, no change after lay off 4/5 9/29/2020                    Patient will improve Tinetti score to 18 to demonstrate decreased risk for falls.                 Status at IE: 9                 Current: Will reassess in future visits                    Patient will ambulate 1000ft on level surface with LRAD and minimal cues to clear right foot during gait cycle.                 Status at IE:2WW and drags right foot                 Current: In-progress, 150 feet with 2WW and close supervision, this is a reduction in activity tolerance since last visit. Patient has been absent for a couple of weeks due to loss of family member. 9/29/2020                    Patient will improve FOTO to 48 points overall to demonstrate improvement in functional ability.                Status at IE:FOTO score = 41 (an established functional score where 100 = no disability)                 Current:  In-progress 43 9/29/2020    ===========================================================================================  Subjective: Patient reports that he had a break in treatment due to loss of family member. He states that he feels slower now.  He reports that he performs HEP \"some of the time. \"     Objective:   FOTO 43           MMT 4/5  TUG 32    Therapist Signature: Karen Alex PT, DPT Date: 7/94/5646   Re-Certification:  Time: 11:26 AM           [facility] at [facility], [address] [place] [address]   Phone: [phone]   Fax: [phone]

## 2020-09-29 NOTE — PROGRESS NOTES
PT DAILY TREATMENT NOTE - [facility]     Patient Name: Ayala Qualia  Date:2020  : 1940  [x]  Patient  Verified  Payor: Alisa Adan / Plan: [place] MEDICARE PART A & B / Product Type: Medicare /    In time:1015  Out time:1100  Total Treatment Time (min): 45  Total Timed Codes (min): 45  1:1 Treatment Time ( [address] only): 45   Visit #: 17 of 24    Treatment Area: Other abnormalities of gait and mobility [R26.89]  Right leg weakness [R29.898]  Left leg weakness [R29.898]    SUBJECTIVE  Pain Level (0-10 scale): 0  Any medication changes, allergies to medications, adverse drug reactions, diagnosis change, or new procedure performed?: [x] No    [] Yes (see summary sheet for update)  Subjective functional status/changes:   [] No changes reported    Patient reports that he had a break in treatment due to loss of family member. He states that he feels slower now. He reports that he performs HEP \"some of the time. \"     OBJECTIVE    45 min Therapeutic Exercise:  [x] See flow sheet :   Rationale: increase ROM, increase strength and decrease pain to improve the patients ability to complete ADLs          With   [x] TE   [] TA   [] neuro   [] other: Patient Education: [x] Review HEP    [] Progressed/Changed HEP based on:   [] positioning   [] body mechanics   [] transfers   [] heat/ice application    [] other:      Other Objective/Functional Measures:   FOTO 43   MMT 4/5  TUG 32    Pain Level (0-10 scale) post treatment: 0    ASSESSMENT/Changes in Function: Patient responds well to treatment session. No adverse effects were noted from today's treatment session. Patient returns after a layoff from physical therapy due to family responsibilities. Improvement since last progress note as been minimal as a result of absence. Will focus on developing consistency with exercise in future visits as patient transitions toward family supervised exercise.   Patient will continue to benefit from skilled PT services to modify and progress therapeutic interventions, address functional mobility deficits, address ROM deficits, address strength deficits, analyze and address soft tissue restrictions, analyze and cue movement patterns, analyze and modify body mechanics/ergonomics, assess and modify postural abnormalities, address imbalance and instruct in home and community integration to attain remaining goals. []  See Plan of Care  []  See progress note/recertification  []  See Discharge Summary         Progress towards goals / Updated goals:  Short Term Goals: To be accomplished in 4 weeks:                 SUIXRQC will report compliance with HEP at least 1x/day to aid in rehabilitation program.                 Status at IE: Reviewed safety with AD                 Current:  In-progress developing consistency with HEP 9/29/2020                    Patient will decrease TUG by 10 seconds to display MCID and progression to decreased falls risk.                 Status at IE: 35 seconds                 Current:  In-progress, 32 seconds 8/29/2020     Long Term Goals: To be accomplished in 8 weeks:                 Patient will increase strength to 4+/5 throughout B LEs to aid in return recreational activities and ADLs.                Status at IE: 4-/5                 Current: In-progress, no change after lay off 4/5 9/29/2020                    Patient will improve Tinetti score to 18 to demonstrate decreased risk for falls.                 Status at IE: 9                 Current: Will reassess in future visits                    Patient will ambulate 1000ft on level surface with LRAD and minimal cues to clear right foot during gait cycle.                 Status at IE:2WW and drags right foot                 Current: In-progress, 150 feet with 2WW and close supervision, this is a reduction in activity tolerance since last visit. Patient has been absent for a couple of weeks due to loss of family member.  9/29/2020                    Patient will improve FOTO to 48 points overall to demonstrate improvement in functional ability.                  Status at IE:FOTO score = 41 (an established functional score where 100 = no disability)                 Current:  In-progress 43 9/29/2020    PLAN  []  Upgrade activities as tolerated     [x]  Continue plan of care  []  Update interventions per flow sheet       []  Discharge due to:_  []  Other:_      Chucky Orellana PT, DPT 9/29/2020  10:18 AM    Future Appointments   Date Time Provider Caprice Leal   10/1/2020 11:00 AM Kirstie Bates, PT [facility]   10/6/2020 10:15 AM Kirstie Bates, PT [facility]   10/8/2020  2:30 PM Kirstie Bates, PT [facility]   10/13/2020 11:00 AM Kirstie Bates, PT [facility]

## 2020-10-01 ENCOUNTER — HOSPITAL ENCOUNTER (OUTPATIENT)
Dept: PHYSICAL THERAPY | Age: 80
Discharge: HOME | End: 2020-10-01
Payer: MEDICARE

## 2020-10-01 PROCEDURE — 97112 NEUROMUSCULAR REEDUCATION: CPT

## 2020-10-01 PROCEDURE — 97110 THERAPEUTIC EXERCISES: CPT

## 2020-10-01 NOTE — PROGRESS NOTES
PT DAILY TREATMENT NOTE - [facility]     Patient Name: Sean Mcguire  SQMV:  : 1940  [x]  Patient  Verified  Payor: [place] MEDICARE / Plan: [place] MEDICARE PART A & B / Product Type: Medicare /    In time:1055  Out time:1135  Total Treatment Time (min): 40  Total Timed Codes (min): 40   1:1 Treatment Time ( [address] only): 40   Visit #: 18 of 24    Treatment Area: Other abnormalities of gait and mobility [R26.89]  Right leg weakness [R29.898]  Left leg weakness [R29.898]    SUBJECTIVE  Pain Level (0-10 scale): 0  Any medication changes, allergies to medications, adverse drug reactions, diagnosis change, or new procedure performed?: [x] No    [] Yes (see summary sheet for update)  Subjective functional status/changes:   [] No changes reported    Patient reports no new changes since last visit. OBJECTIVE    25 min Therapeutic Exercise:  [x] See flow sheet :   Rationale: increase ROM, increase strength and decrease pain to improve the patients ability to complete ADLs     15 min Neuromuscular Re-education:  [x]  See flow sheet :   Rationale: improve coordination, improve balance and increase proprioception  to improve the patients ability to complete ADLs          With   [x] TE   [] TA   [] neuro   [] other: Patient Education: [x] Review HEP    [] Progressed/Changed HEP based on:   [] positioning   [] body mechanics   [] transfers   [] heat/ice application    [] other:      Other Objective/Functional Measures: NA     Pain Level (0-10 scale) post treatment: 0    ASSESSMENT/Changes in Function: Patient responds well to treatment session. Patient demonstrated improved activity tolerance as he was able to perform exercise at the level he was at prior to short break in service due to family event. He requires cues for close supervision and occasional CGA with weight bearing exercise. He tired quickly with toe tap exercise. Will elevate step next visit to promote improved hip flexion.  Patient requires steady dues to clear his right foot when ambulating within the clinic. No adverse effects were noted from today's treatment session    Patient will continue to benefit from skilled PT services to modify and progress therapeutic interventions, address functional mobility deficits, address ROM deficits, address strength deficits, analyze and address soft tissue restrictions, analyze and cue movement patterns, analyze and modify body mechanics/ergonomics, assess and modify postural abnormalities, address imbalance and instruct in home and community integration to attain remaining goals. []  See Plan of Care  []  See progress note/recertification  []  See Discharge Summary         Progress towards goals / Updated goals:  Short Term Goals: To be accomplished in 4 weeks:                 WDZCSWD will report compliance with HEP at least 1x/day to aid in rehabilitation program.                 Status at IE: Reviewed safety with AD                 Current:  In-progress developing consistency with HEP 9/29/2020                    Patient will decrease TUG by 10 seconds to display MCID and progression to decreased falls risk.                 Status at IE: 35 seconds                 Current:  In-progress, 32 seconds 8/29/2020     Long Term Goals: To be accomplished in 8 weeks:                 Patient will increase strength to 4+/5 throughout B LEs to aid in return recreational activities and ADLs.                Status at IE: 4-/5                 Current: In-progress, no change after lay off 4/5 9/29/2020                    Patient will improve Tinetti score to 18 to demonstrate decreased risk for falls.                 Status at IE: 9                 Current: Will reassess in future visits                    Patient will ambulate 1000ft on level surface with LRAD and minimal cues to clear right foot during gait cycle.                 Status at IE:2WW and drags right foot                 Current: In-progress, 150 feet with 2WW and close supervision, this is a reduction in activity tolerance since last visit. Patient has been absent for a couple of weeks due to loss of family member. 9/29/2020                    Patient will improve FOTO to 48 points overall to demonstrate improvement in functional ability.                  Status at IE:FOTO score = 41 (an established functional score where 100 = no disability)                 Current:  In-progress 43 9/29/2020    PLAN  []  Upgrade activities as tolerated     [x]  Continue plan of care  []  Update interventions per flow sheet       []  Discharge due to:_  []  Other:_      Ashly Cullen, PT, DPT 10/1/2020  11:03 AM    Future Appointments   Date Time Provider Caprice Leal   10/6/2020 10:15 AM AMADOR Alcaraz [facility]   10/8/2020  2:30 PM AMADOR Alcaraz [facility]   10/13/2020 11:00 AM Nini Barahona PT [facility]   10/15/2020 11:45 AM Nini Barahona PT [facility]   10/20/2020 11:00 AM AMADOR Alcaraz [facility]

## 2020-10-06 ENCOUNTER — HOSPITAL ENCOUNTER (OUTPATIENT)
Dept: PHYSICAL THERAPY | Age: 80
Discharge: HOME | End: 2020-10-06
Payer: MEDICARE

## 2020-10-06 PROCEDURE — 97110 THERAPEUTIC EXERCISES: CPT

## 2020-10-06 NOTE — PROGRESS NOTES
PT DAILY TREATMENT NOTE - [facility]     Patient Name: Leah Jurado  MRFL:[id number]  : 1940  [x]  Patient  Verified  Payor: [place] MEDICARE / Plan: [place] MEDICARE PART A & B / Product Type: Medicare /    In time:1015  Out time:1100  Total Treatment Time (min): 45  Total Timed Codes (min): 45   1:1 Treatment Time ( [address] only): 40   Visit #: 19 of 24    Treatment Area: Other abnormalities of gait and mobility [R26.89]  Right leg weakness [R29.898]  Left leg weakness [R29.898]    SUBJECTIVE  Pain Level (0-10 scale): 0  Any medication changes, allergies to medications, adverse drug reactions, diagnosis change, or new procedure performed?: [x] No    [] Yes (see summary sheet for update)  Subjective functional status/changes:   [] No changes reported    Patient reports that he is feeling better today. OBJECTIVE    40 min Therapeutic Exercise:  [x] See flow sheet :   Rationale: increase ROM, increase strength and decrease pain to improve the patients ability to complete ADLs            With   [x] TE   [] TA   [] neuro   [] other: Patient Education: [x] Review HEP    [] Progressed/Changed HEP based on:   [] positioning   [] body mechanics   [] transfers   [] heat/ice application    [] other:      Other Objective/Functional Measures: NA     Pain Level (0-10 scale) post treatment: 0    ASSESSMENT/Changes in Function: Patient responds well to treatment session. Patient demonstrated improved activity tolerance. Advance exercise by having patient perform sit to stand from standard chair height. He required CGA to perform the activity. He was challenged with exercises prescribed.  No adverse effects were noted from today's treatment session    Patient will continue to benefit from skilled PT services to modify and progress therapeutic interventions, address functional mobility deficits, address ROM deficits, address strength deficits, analyze and address soft tissue restrictions, analyze and cue movement patterns, analyze and modify body mechanics/ergonomics, assess and modify postural abnormalities, address imbalance and instruct in home and community integration to attain remaining goals. []  See Plan of Care  []  See progress note/recertification  []  See Discharge Summary         Progress towards goals / Updated goals:  Short Term Goals: To be accomplished in 4 weeks:                 WNLODKG will report compliance with HEP at least 1x/day to aid in rehabilitation program.                 Status at IE: Reviewed safety with AD                 Current:  In-progress developing consistency with HEP 9/29/2020                    Patient will decrease TUG by 10 seconds to display MCID and progression to decreased falls risk.                 Status at IE: 35 seconds                 Current:  In-progress, 32 seconds 8/29/2020     Long Term Goals: To be accomplished in 8 weeks:                 Patient will increase strength to 4+/5 throughout B LEs to aid in return recreational activities and ADLs.                Status at IE: 4-/5                 Current: In-progress, no change after lay off 4/5 9/29/2020                    Patient will improve Tinetti score to 18 to demonstrate decreased risk for falls.                 Status at IE: 9                 Current: Will reassess in future visits                    Patient will ambulate 1000ft on level surface with LRAD and minimal cues to clear right foot during gait cycle.                 Status at IE:2WW and drags right foot                 Current: In-progress, 150 feet with 2WW and close supervision, this is a reduction in activity tolerance since last visit. Patient has been absent for a couple of weeks due to loss of family member. 9/29/2020                    Patient will improve FOTO to 48 points overall to demonstrate improvement in functional ability.                  Status at IE:FOTO score = 41 (an established functional score where 100 = no disability)                 Current:  In-progress 72 7/58/5155    PLAN  []  Upgrade activities as tolerated     [x]  Continue plan of care  []  Update interventions per flow sheet       []  Discharge due to:_  []  Other:_      Raphael Epstein, PT, DPT 10/6/2020  10:27 AM    Future Appointments   Date Time Provider Caprice Leal   10/8/2020  2:30 PM AMADOR Colon [facility]   10/13/2020 11:00 AM Brigette Vergara PT [facility]   10/15/2020 11:45 AM Brigette Vergara PT [facility]   10/20/2020 11:00 AM Brigette Vergara, PT [facility]

## 2020-10-08 ENCOUNTER — HOSPITAL ENCOUNTER (OUTPATIENT)
Dept: PHYSICAL THERAPY | Age: 80
Discharge: HOME | End: 2020-10-08
Payer: MEDICARE

## 2020-10-08 PROCEDURE — 97110 THERAPEUTIC EXERCISES: CPT

## 2020-10-08 NOTE — PROGRESS NOTES
PT DAILY TREATMENT NOTE - [facility]     Patient Name: Sherri Estrada  RFHV:  : 1940  [x]  Patient  Verified  Payor: Michael Yeboah / Plan: [place] MEDICARE PART A & B / Product Type: Medicare /    In time:1425  Out time:151  Total Treatment Time (min): 50  Total Timed Codes (min): 50   1:1 Treatment Time ( [address] only): 50   Visit #: 20 of 24    Treatment Area: Other abnormalities of gait and mobility [R26.89]  Right leg weakness [R29.898]  Left leg weakness [R29.898]    SUBJECTIVE  Pain Level (0-10 scale): 0  Any medication changes, allergies to medications, adverse drug reactions, diagnosis change, or new procedure performed?: [x] No    [] Yes (see summary sheet for update)  Subjective functional status/changes:   [] No changes reported    Patient reports that getting in and out of chairs is getting easier at home. OBJECTIVE    50 min Therapeutic Exercise:  [x] See flow sheet :   Rationale: increase ROM, increase strength and decrease pain to improve the patients ability to complete ADLs        With   [x] TE   [] TA   [] neuro   [] other: Patient Education: [x] Review HEP    [] Progressed/Changed HEP based on:   [] positioning   [] body mechanics   [] transfers   [] heat/ice application    [] other:      Other Objective/Functional Measures: NA     Pain Level (0-10 scale) post treatment: 0    ASSESSMENT/Changes in Function: Patient responds well to treatment session. Patient demonstrated improved activity tolerating more activity in weight bearing requiring fewer recovery periods in seated position. He continues to require CGA with sit to stand exercise.  No adverse effects were noted from today's treatment session    Patient will continue to benefit from skilled PT services to modify and progress therapeutic interventions, address functional mobility deficits, address ROM deficits, address strength deficits, analyze and address soft tissue restrictions, analyze and cue movement patterns, analyze and modify body mechanics/ergonomics, assess and modify postural abnormalities, and instruct in home and community integration to attain remaining goals. []  See Plan of Care  []  See progress note/recertification  []  See Discharge Summary         Progress towards goals / Updated goals:  Short Term Goals: To be accomplished in 4 weeks:                 IZHHTAB will report compliance with HEP at least 1x/day to aid in rehabilitation program.                 Status at IE: Reviewed safety with AD                 Current:  Met 10/8/2020                    Patient will decrease TUG by 10 seconds to display MCID and progression to decreased falls risk.                 Status at IE: 35 seconds                 Current:  In-progress, 32 seconds 8/29/2020     Long Term Goals: To be accomplished in 8 weeks:                 YNDPAQL will increase strength to 4+/5 throughout B LEs to aid in return recreational activities and ADLs.                Status at IE: 4-/5                 Current: In-progress, no change after lay off 4/5 9/29/2020                    Patient will improve Tinetti score to 18 to demonstrate decreased risk for falls.                 Status at IE: 9                 Current: Will reassess in future visits                    Patient will ambulate 1000ft on level surface with LRAD and minimal cues to clear right foot during gait cycle.                 Status at IE:2WW and drags right foot                 Current: In-progress, 150 feet with 2WW and close supervision, this is a reduction in activity tolerance since last visit. Patient has been absent for a couple of weeks due to loss of family member. 9/29/2020                    Patient will improve FOTO to 48 points overall to demonstrate improvement in functional ability.                  Status at IE:FOTO score = 41 (an established functional score where 100 = no disability)                 Current:  In-progress 43 9/29/2020    PLAN  []  Upgrade activities as tolerated     [x]  Continue plan of care  []  Update interventions per flow sheet       []  Discharge due to:_  []  Other:_      Arin Yoon, PT, DPT 10/8/2020  2:36 PM    Future Appointments   Date Time Provider Caprice Leal   10/13/2020 11:00 AM Merari Scott PT [facility]   10/15/2020 11:45 AM AMADOR Roth [facility]   10/20/2020 11:00 AM AMADOR Roth [facility]

## 2020-10-13 ENCOUNTER — HOSPITAL ENCOUNTER (OUTPATIENT)
Dept: PHYSICAL THERAPY | Age: 80
Discharge: HOME | End: 2020-10-13
Payer: MEDICARE

## 2020-10-13 PROCEDURE — 97110 THERAPEUTIC EXERCISES: CPT

## 2020-10-13 NOTE — PROGRESS NOTES
PT DAILY TREATMENT NOTE - [facility]     Patient Name: Elaine Kussmaul  DGYP:  : 1940  [x]  Patient  Verified  Payor: Reece Garcia / Plan: [place] MEDICARE PART A & B / Product Type: Medicare /    In time:1100  Out time:1138  Total Treatment Time (min): 38  Total Timed Codes (min): 38   1:1 Treatment Time ( [address] only): 38   Visit #: 21 of 24    Treatment Area: Other abnormalities of gait and mobility [R26.89]  Right leg weakness [R29.898]  Left leg weakness [R29.898]    SUBJECTIVE  Pain Level (0-10 scale): 0  Any medication changes, allergies to medications, adverse drug reactions, diagnosis change, or new procedure performed?: [x] No    [] Yes (see summary sheet for update)  Subjective functional status/changes:   [] No changes reported    Patient reports no new changes since last visit. OBJECTIVE    38 min Therapeutic Exercise:  [x] See flow sheet :   Rationale: increase ROM, increase strength and decrease pain to improve the patients ability to complete ADLs         With   [x] TE   [] TA   [] neuro   [] other: Patient Education: [x] Review HEP    [] Progressed/Changed HEP based on:   [] positioning   [] body mechanics   [] transfers   [] heat/ice application    [] other:      Other Objective/Functional Measures: NA     Pain Level (0-10 scale) post treatment: 0    ASSESSMENT/Changes in Function: Patient responds well to treatment session. He requires supervision with exercise for safety due to decreased balance. Patient reports compliance with HEP at home. Will reassess patient next visit for potential discharge.   No adverse effects were noted from today's treatment session    Patient will continue to benefit from skilled PT services to modify and progress therapeutic interventions, address functional mobility deficits, address ROM deficits, address strength deficits, analyze and address soft tissue restrictions, analyze and cue movement patterns, analyze and modify body mechanics/ergonomics, assess and modify postural abnormalities, address imbalance and instruct in home and community integration to attain remaining goals. []  See Plan of Care  []  See progress note/recertification  []  See Discharge Summary         Progress towards goals / Updated goals:  Short Term Goals: To be accomplished in 4 weeks:                 TNIWCUB will report compliance with HEP at least 1x/day to aid in rehabilitation program.                 Status at IE: Reviewed safety with AD                 Current:  Met 10/8/2020                    Patient will decrease TUG by 10 seconds to display MCID and progression to decreased falls risk.                 Status at IE: 35 seconds                 Current:  In-progress, 32 seconds 8/29/2020     Long Term Goals: To be accomplished in 8 weeks:                 SKKBAOX will increase strength to 4+/5 throughout B LEs to aid in return recreational activities and ADLs.                Status at IE: 4-/5                 Current: In-progress, no change after lay off 4/5 9/29/2020                    Patient will improve Tinetti score to 18 to demonstrate decreased risk for falls.                 Status at IE: 9                 Current: Will reassess in future visits                    Patient will ambulate 1000ft on level surface with LRAD and minimal cues to clear right foot during gait cycle.                 Status at IE:2WW and drags right foot                 Current: In-progress, 150 feet with 2WW and close supervision, this is a reduction in activity tolerance since last visit. Patient has been absent for a couple of weeks due to loss of family member. 9/29/2020                    Patient will improve FOTO to 48 points overall to demonstrate improvement in functional ability.                  Status at IE:FOTO score = 41 (an established functional score where 100 = no disability)                 Current:  In-progress 43 9/29/2020    PLAN  []  Upgrade activities as tolerated     [x] Continue plan of care  []  Update interventions per flow sheet       []  Discharge due to:_  []  Other:_      Kassandra Lugo PT, DPT 10/13/2020  11:10 AM    Future Appointments   Date Time Provider Caprice Leal   10/15/2020 11:45 AM AMADOR Villalta [facility]   10/20/2020 11:00 AM AMADOR Villalta [facility]

## 2020-10-15 ENCOUNTER — HOSPITAL ENCOUNTER (OUTPATIENT)
Dept: PHYSICAL THERAPY | Age: 80
Discharge: HOME | End: 2020-10-15
Payer: MEDICARE

## 2020-10-15 PROCEDURE — 97110 THERAPEUTIC EXERCISES: CPT

## 2020-10-15 NOTE — PROGRESS NOTES
PT DAILY TREATMENT NOTE - [facility]     Patient Name: Cecilia Wilson  BMXP:  : 1940  [x]  Patient  Verified  Payor: [place] MEDICARE / Plan: [place] MEDICARE PART A & B / Product Type: Medicare /    In RTPW:8765  Out time:1225  Total Treatment Time (min): 40  Total Timed Codes (min): 40   1:1 Treatment Time ( [address] only): 40   Visit #: 22 of 24    Treatment Area: Other abnormalities of gait and mobility [R26.89]  Right leg weakness [R29.898]  Left leg weakness [R29.898]    SUBJECTIVE  Pain Level (0-10 scale): 0  Any medication changes, allergies to medications, adverse drug reactions, diagnosis change, or new procedure performed?: [x] No    [] Yes (see summary sheet for update)  Subjective functional status/changes:   [] No changes reported    Patient reports no new changes since last visit. He reports getting out of a chair is easier. OBJECTIVE    40 min Therapeutic Exercise:  [x] See flow sheet :   Rationale: increase ROM, increase strength and decrease pain to improve the patients ability to complete ADLs          With   [x] TE   [] TA   [] neuro   [] other: Patient Education: [x] Review HEP    [] Progressed/Changed HEP based on:   [] positioning   [] body mechanics   [] transfers   [] heat/ice application    [] other:      Other Objective/Functional Measures:     MMT   L(0-5) R (0-5)   Hip Flexion (L1,2) 5 4   Knee Extension (L3,4) 5 4+   Ankle Dorsiflexion (L4) 4+ 4   Great Toe Extension (L5) 4 4   Ankle Plantarflexion (S1) 4 4-   Knee Flexion (S1,2) 5 4   Abdominals 4 4   Paraspinals 4 4   Back Rotators 4 4   Gluteus Franky 4 4   Hip Abduction 4+ 4     FOTO 45    Tinetti     TUG 30      Pain Level (0-10 scale) post treatment: 0    ASSESSMENT/Changes in Function:     Patient responds well to treatment session. No adverse effects were noted from today's treatment session.  Patient is being discharged as he has met maximum benefit from physical therapy progressing but not meeting all of his goals. He has improved strength and activity tolerance resulting in reduced fall risk. Patient plans on continuing to participate in exercise with HEP with assistance of family supervision. Provided HEP to promote seamless transition to supervised exercise. []  See Plan of Care  []  See progress note/recertification  [x]  See Discharge Summary         Progress towards goals / Updated goals:  Short Term Goals: To be accomplished in 4 weeks:                 TUPDWRV will report compliance with HEP at least 1x/day to aid in rehabilitation program.                 Status at IE: Reviewed safety with AD                 Current:  Met 10/8/2020                    Patient will decrease TUG by 10 seconds to display MCID and progression to decreased falls risk.                 Status at IE: 35 seconds                 Current:  Not Met 30 seconds 10/15/2020     Long Term Goals: To be accomplished in 8 weeks:                 DXHNZEA will increase strength to 4+/5 throughout B LEs to aid in return recreational activities and ADLs.                Status at IE: 4-/5                 Current:  partially met 10/15/2020   L(0-5) R (0-5)   Hip Flexion (L1,2) 5 4   Knee Extension (L3,4) 5 4+   Ankle Dorsiflexion (L4) 4+ 4   Great Toe Extension (L5) 4 4   Ankle Plantarflexion (S1) 4 4-   Knee Flexion (S1,2) 5 4   Abdominals 4 4   Paraspinals 4 4   Back Rotators 4 4   Gluteus Franky 4 4   Hip Abduction 4+ 4                       Patient will improve Tinetti score to 18 to demonstrate decreased risk for falls.                 Status at IE: 9                 Current: Not Met 16/28 patient has reduced is risk for falls but did not meet goals due to severity of limitation prior to this episode of care.  10/15/2020                    Patient will ambulate 1000ft on level surface with LRAD and minimal cues to clear right foot during gait cycle.                 Status at IE:2WW and drags right foot                 Current: Partially, 500 feet with 2WW and close supervision, Patient has improved activity tolerance but is limited due to severity of condition prior to the current episode 10/15/2020                    Patient will improve FOTO to 48 points overall to demonstrate improvement in functional ability.                Status at IE:FOTO score = 41 (an established functional score where 100 = no disability)                 Current:  Partially Met 45 10/15/2020    PLAN  []  Upgrade activities as tolerated     []  Continue plan of care  []  Update interventions per flow sheet       [x]  Discharge due to: Patient met max medical benefit from physical therapy to improved safety with ambulation and increased activity tolerance reducing fall risk.     []  Other:_      Martine Hay PT, DPT 10/15/2020  12:03 PM    Future Appointments   Date Time Provider Caprice Leal   10/20/2020 11:00 AM Tom Sawant PT [facility]

## 2020-10-15 NOTE — PROGRESS NOTES
[facility] at [address] [place] [address] Drive: [phone]   Fax: [phone]  Discharge Summary  Patient Name: Eric Arizmendi : 1940   Medical   Diagnosis: Other abnormalities of gait and mobility [R26.89]  Right leg weakness [R29.898]  Left leg weakness [R29.898] Treatment Diagnosis: Other abnormalities of gait and mobility [R26.89]  Right leg weakness [R29.898]  Left leg weakness [R29.898]     Onset Date: Chronic     Referral Source: Rosalino Parkinson NP Start of [facility]): 10/15/2020   Prior Hospitalization: See medical history Provider #: [id number]   Prior Level of Function: Sedentary, 2WW for ambulation   Comorbidities: High Blood Pressure, High Cholesterol, Hx TIA, OA, Thyroidism, TBI   Medications: Verified on Patient Summary List      ===========================================================================================  Assessment / Summary of Care:  Eric Arizmendi is a 70 y.o. male with  c/o difficulty walking, decreased balance and reduced activity tolerance. Patient is being discharged as he has met maximum benefit from physical therapy progressing but not meeting all of his goals. He has improved strength and activity tolerance resulting in reduced fall risk. Patient plans on continuing to participate in exercise with HEP with assistance of family supervision. Provided HEP to promote seamless transition to supervised exercise.      ===========================================================================================    Plan: Discharge to independent [facility].      Goals:     Short Term Goals: To be accomplished in 4 weeks:                 GMGMHIH will report compliance with HEP at least 1x/day to aid in rehabilitation program.                 Status at IE: Reviewed safety with AD                 Current:  Met 10/8/2020                    Patient will decrease TUG by 10 seconds to display MCID and progression to decreased falls risk.                 Status at IE: 35 seconds                 Current:  Not Met 30 seconds 10/15/2020     Long Term Goals: To be accomplished in 8 weeks:                 HUANG will increase strength to 4+/5 throughout B LEs to aid in return recreational activities and ADLs.                Status at IE: 4-/5                 Current:  partially met 10/15/2020    L(0-5) R (0-5)   Hip Flexion (L1,2) 5 4   Knee Extension (L3,4) 5 4+   Ankle Dorsiflexion (L4) 4+ 4   Great Toe Extension (L5) 4 4   Ankle Plantarflexion (S1) 4 4-   Knee Flexion (S1,2) 5 4   Abdominals 4 4   Paraspinals 4 4   Back Rotators 4 4   Gluteus Franky 4 4   Hip Abduction 4+ 4                        Patient will improve Tinetti score to 18 to demonstrate decreased risk for falls.                 Status at IE: 9                 Current: Not Met 16/28 patient has reduced is risk for falls but did not meet goals due to severity of limitation prior to this episode of care. 10/15/2020                    Patient will ambulate 1000ft on level surface with LRAD and minimal cues to clear right foot during gait cycle.                 Status at IE:2WW and drags right foot                 Current: Partially, 500 feet with 2WW and close supervision, Patient has improved activity tolerance but is limited due to severity of condition prior to the current episode 10/15/2020                    Patient will improve FOTO to 48 points overall to demonstrate improvement in functional ability.                Status at IE:FOTO score = 41 (an established functional score where 100 = no disability)                 Current:  Partially Met 45 10/15/2020      ===========================================================================================  Subjective:   Patient reports no new changes since last visit. He reports getting out of a chair is easier.        Objective:      MMT    L(0-5) R (0-5)   Hip Flexion (L1,2) 5 4   Knee Extension (L3,4) 5 4+   Ankle Dorsiflexion (L4) 4+ 4   Great Toe Extension (L5) 4 4   Ankle Plantarflexion (S1) 4 4-   Knee Flexion (S1,2) 5 4   Abdominals 4 4   Paraspinals 4 4   Back Rotators 4 4   Gluteus Franky 4 4   Hip Abduction 4+ 4      FOTO 45     Franco 16/28     TUG 30      Therapist Signature: Claude Rhea, PT, DPT Date: 10/15/2020     Time: 12:54 PM

## 2020-10-20 ENCOUNTER — HOSPITAL ENCOUNTER (OUTPATIENT)
Dept: PHYSICAL THERAPY | Age: 80
End: 2020-10-20
Payer: MEDICARE

## 2021-02-10 ENCOUNTER — HOSPITAL ENCOUNTER (OUTPATIENT)
Dept: PHYSICAL THERAPY | Age: 81
Discharge: HOME | End: 2021-02-10
Payer: MEDICARE

## 2021-02-10 PROCEDURE — 92526 ORAL FUNCTION THERAPY: CPT

## 2021-02-10 PROCEDURE — 92610 EVALUATE SWALLOWING FUNCTION: CPT

## 2021-02-10 NOTE — PROGRESS NOTES
[facility] at THE [address]  [facility], [address]  Ph [phone]  Fax [phone]    Plan of Care/ Statement of Necessity for Speech Therapy Services    Patient name: Eric Moreno Start of Care: 2/10/2021   Referral source: Monster Tavarez NP : 1940    Medical Diagnosis: Dysphagia [R13.10]   Onset Date: Several months ago - progressive    Treatment Diagnosis: Dysphagia   Prior Hospitalization: see medical history Provider#: [id number]   Medications: Verified on Patient summary List   Comorbidities: Dementia, arthritis, thyroid problems, high blood pressure, TIAs, neck surgery (C3-C7)  Prior Level of Function: Living with wife, no PMHx of dysphagia  Radiology: None  Current diet: Regular solids, thin liquid   Positioning: Chair, 90 degrees  Mental Status:  [x]alert  []lethargic [x]confused  Orientation:   [x]person [x]place []time  []situation  AC Directions:  [x]1-step [x]2-step []3-step []complex  Motivation:   []excellent []good  [x]fair  []poor   Barriers to learning: []none []aphasia [x]? cognition []lethargy/motivation [][place]     []?vision []language []Fatigue/pain    []psych factors      [] compensate with:     Dentition:  []normal []abnormal []edentulous [x]dentures   Respiratory Status: [x]WFL []SOB  []O2 L/min:  []NC []Mask         []Trach Tube:                     []Excess secretions  Lips:  [x]Symmetrical []asymmetrical  Retraction [x]WFL  []?min []?mod []?max  Protrusion [x]WFL  []?min []?mod []?max  Strength [x]WFL  []?min []?mod []?max  Puff  [x]WFL  []?min []?mod []?max  Tongue:  [x]Symmetrical []asymmetrical  Protrusion [x]WFL  []?min []?mod []?max  Elevation [x]WFL  []?min []?mod []?max  Depression [x]WFL  []?min []?mod []?max  Lateralization [x]WFL  []?min []?mod []?max  Strength [x]WFL  []?min []?mod []?max  Velum:  [x]Symmetrical []asymmetrical  Gag Reflex:  []Present []absent [x]DNT  Sensation:  []Intact []Diminished  Voice:  [x]Normal []Hoarse []harsh  []Breathy []Hypernasal []hyponasal  [][place]urgly Other:   Swallow:  [x]Volitional []absent  [x]Reflexive []absent  Cough   Strength : [x]WNL []Diminished  [x]Volitional []absent  [x]Reflexive []absent    OBJECTIVE  OP SWALLOW EVALUATION    Observation of Swallow:  Thin Nectar Honey Puree  applesauce Solids  Mechanical soft fruit and grain bar/regular peanut butter cracker nabs Other  Mixed consistency canned fruit cocktail   Oral Phase         Anterior loss of bolus  (decreased labial seal [])         Decreased bolus formation  (?lingual ROM/Coord[])         Increased mastication         Increased oral transit time  (?A-P bolus transit[])         Abnormal chewing         Tongue pumping/tremors         Pocketing  (?labial/buccal tension/lingual control)         Other         Oral Pharyngeal Phase         Delayed swallow initiation         Absent swallow         Stasis on lingual surface  (?lingual movement)         Adherence to hard palate   (? lingual elevation)         Pharyngeal Phase         Multiple swallows  (residue in pharynx [])         Coughing post swallow         Throat clear post swallow         Wet vocal quality  (residue on vocal cords [])         Reduced laryngeal elevation         Nasal Regurgitation  (? velopharyngeal closure)         Other           [] No symptoms of dysphagia evidenced  [] Symptoms of dysphagia observed  [] Patient at risk for aspiration  [x] Other: No symptoms of dysphagia observed during evaluation, however suspect mild pharyngeal dysphagia due to patient's wife report of occasional coughing and choking with solids and liquids at home    Recommendations:  Diet:  [] NPO    [] Pureed [] Ground [] MechSoft [x] Regular  Liquids: [x] Water  [] Regular [] Thickened   [] [place]  [] Honeythick  [] Pudding  Presentation: [x] Cup    [] Spoon [x] Straw [x] Alternate liquids and solids  Monitor: [x] Sitting up at 90 deg [] Reclined to:  [] Head turned to:    [] Chin tuck  [] Head tilt to:      [x] Seated upright post meals (min): 30-45 min  Document: [x] Coughing [] Temperatures [] Lung Sounds    Videoflouroscopy:  [x] Yes [] No  Dysphagia Treatment:  [x] Yes [] No Sessions per week: 1-2  Other:    Remediation Techniques:  C = Compensatory techniques to use during meal      F = Facilitation/treatment techniques by SLP    [] Supraglottic Swallow (c,f)    [] Oral motor exercises (f)  [] Super-supraglottic swallow (c,f)   [] Labial closure  [] Compensations for pocketing (c)   [] Lingual elevation  [] Sweep mouth with tongue    [] Lingual lateralization  [] Sweep mouth with finger    [] Lingual anterior-posterior  [] External pressure to check    [] Lingual base of tongue  [] Rinse mouth/expel after meal   [] Vocal Fold Exercises (f)  [] Alternate liquid swallows every _ bites (c)  [x] Falsetto/laryngeal elevation exercises (f)  [] Discourage liquid wash between bites (c)  [] Thermal application (c,f)  [] Multiple swallows     [] Sour bolus (f)  [x] Patient needs cues     [] Cold bolus (f)  [] Patient does not need cues   [x] Pharyngeal exercise (f)  [] Mendelsohn Maneuver (c,f)    [] Breath hold  [] Encourage/stimulate lip closure (c)   [x] Effortful Swallow (c,f)  [x] Empty mouth before next bite (c)   [x] Tongue base retraction  [x] Cue patient to slow down (c)    [] Tongue hold  [] Encourage coughing (c)    [x] Laryngeal closure  []Chin tuck (c)  []Head turn (c)  []Head turn, chin tuck (c)    ASSESSMENT  The patient is an 68year old male referred for a dysphagia evaluation due to coughing and choking with both solids and liquids, as reported by patient's wife. The patient attended the evaluation accompanied by his wife, who provided all history. The patient has cognitive deficits secondary to dementia.  The patient's wife reports a history of dysphagia for the last several months with no known cause. She reports there is no pattern between food/liquid consistency and coughing/choking. Oral-motor exam revealed structures grossly intact for mastication and deglutition. Patient observed self-feeding thin liquid via straw with single and serial swallows, puree and solid trials. Pt exhibited adequate bolus cohesion, manipulation and A-P transit. 0 overt signs or symptoms of aspiration across all consistencies during evaluation. Recommend patient continue regular solid diet with thin liquids. Recommend a modified barium swallow study to further assess swallow integrity and rule out aspiration. The patient should utilize aspiration precautions with all PO intake. The patient would benefit from skilled ST intervention to address dysphagia for increased safety, feeding QOL, and to ensure adequate nutrition/hydration.      Problem List:   Dysphagia    Treatment Plan may include any combination of the following: Dysphagia Treatment, Treatment of Swallowing and Patient Education      Patient / Family readiness to learn indicated by: asking questions, trying to perform skills and interest    Persons(s) to be included in education:   patient (P) and family support person (FSP);list Wife    Barriers to Learning/Limitations: yes;  cognitive    Patient Goal (s): Prevent cough and choking    Patient Self Reported Health Status: fair    Rehabilitation Potential: good    Short Term Goals: To be accomplished in 8 treatments  1. Complete pharyngeal swallow exercises (effortful swallow, ra, mendelsohn, supraglottic, super-supraglottic, shaker, as appropriate) in therapy and at home in 4/5 trials with min A given visual/verbal cues to increase pharyngeal muscle strength.    2. Demonstrate ability to utilize aspiration/reflux precautions and compensatory strategies (double swallow; diet consistency adjustment; alternate liquids/solids; small sips/bites, meds crushed) to decrease the reported incidences of dysphagia and s/sx of aspiration with min A with visual/verbal cues.   2. Participate in evaluation of swallow function (i.e. Modified Barium Swallow) as per MD order to assess s/sx of aspiration, ensure diet tolerance and patient safety during meals.     Long Term Goals: To be accomplished in 6-8 weeks   1. Tolerate L.R.D. to facilitate maintenance of hydration/nutrition needs without overt s/sx of aspiration in 3/5 trials with min A with visual/verbal cues.   2. Demonstrate functional increase in swallowing skills and aspiration/reflux precautions for effective participation in eating ADLs with min A given visual/verbal/written cues.     Frequency / Duration: Patient to be seen 1-2 times per week for 4 weeks    Patient/ Caregiver education and instruction: Diagnosis, prognosis, Swallowing Precautions and Compensatory Techniques    Certification Period: 2/10/21-3/12/21    GABRIEL Lizama 2/10/2021 1:16 PM  ______________________________________________________________________    I certify that the above Therapy Services are being furnished while the patient is under my care. I agree with the treatment plan and certify that this therapy is necessary.     [de-identified] Signature:____________________  Date:___________Time:_________

## 2021-02-10 NOTE — PROGRESS NOTES
ST DAILY TREATMENT NOTE    Patient Name: Joycelyn Sidhu  HQVT:  : 1940  [x]  Patient  Verified  Payor: [place]or: [place] MED[place] / Plan: [place] MEDICARE PART A & B / Product Type: Medicare /   In time: 202 Out time:   Total Treatment Time (min): 35  Visit #: 1 of 8    Treatment Diagnosis: Dysphagia [R13.10]    SUBJECTIVE  Pain Level (0-10 scale): 0  Any medication changes, allergies to medications, adverse drug reactions, diagnosis change, or new procedure performed?: [x] No    [] Yes (see summary sheet for update)    Subjective functional status/changes:   [] No changes reported  Pt's wife present for dysphagia evaluation    OBJECTIVE  Treatment provided includes:  Increase/Improve:  []  Voice Quality []  Cognitive Linguistic Skills []  Laryngeal/Pharyngeal Exercises   []  Vocal Loudness []  Reading Comprehension [x]  Swallowing Skills    []  Vocal Cord Function []  Auditory Comprehension []  Oral Motor Skills   []  Resonance []  Writing Skills [x]  Compensatory strategies    []  Speech Intelligibility []  Expressive Language []  Attention   []  Breath Support/Coord. []  Receptive language []  Memory   []  Articulation []  Safety Awareness []    []  Fluency []  Word Retrieval []      Treatment Provided:  Education re: safe swallowing guidelines    Patient/Caregiver  Education: [x] Review HEP      HEP/Handouts given: None    Pain Level (0-10 scale) post treatment: 0    ASSESSMENT   [x]   Improving appropriately and progressing toward goals  []   Improving slowly and progressing toward goals  []   Approximating goals/maximum potential  [x]   Continues to benefit from skilled therapy to address remaining functional deficits  []   Not progressing toward goals and plan of care will be adjusted    Patient will continue to benefit from skilled therapy to address remaining functional deficits: dysphagia    Progress towards goals / Updated goals:  1.  Complete pharyngeal swallow exercises (effortful swallow, ra, mendelsohn, supraglottic, super-supraglottic, shaker, as appropriate) in therapy and at home in 4/5 trials with min A given visual/verbal cues to increase pharyngeal muscle strength. Status at evaluation: Suspect mild pharyngeal dysphagia   Current status: Not addressed this date    2. Demonstrate ability to utilize aspiration/reflux precautions and compensatory strategies (double swallow; diet consistency adjustment; alternate liquids/solids; small sips/bites, meds crushed) to decrease the reported incidences of dysphagia and s/sx of aspiration with min A with visual/verbal cues.    Status at evaluation: Pt's wife reports coughing and choking at home   Current status: Educated re: safe swallowing guidelines    2. Participate in evaluation of swallow function (i.e. Modified Barium Swallow) as per MD order to assess s/sx of aspiration, ensure diet tolerance and patient safety during meals.    Status at evaluation: Suspect mild pharyngeal dysphagia   Current status: Left voicemail with pt's NP to request MBS order    PLAN  [x]  Continue plan of care  []  Modify Goals/Treatment Plan      []  Discharge due to:  [] Other:      GABRIEL Avila 2/10/2021  2:19 PM    No future appointments.

## 2021-03-03 ENCOUNTER — TRANSCRIBE ORDER (OUTPATIENT)
Dept: SCHEDULING | Age: 81
End: 2021-03-03

## 2021-03-03 DIAGNOSIS — R13.10 DYSPHAGIA: Primary | ICD-10-CM

## 2021-03-10 ENCOUNTER — HOSPITAL ENCOUNTER (OUTPATIENT)
Dept: GENERAL RADIOLOGY | Age: 81
Discharge: HOME | End: 2021-03-10
Payer: MEDICARE

## 2021-03-10 ENCOUNTER — HOSPITAL ENCOUNTER (OUTPATIENT)
Dept: PHYSICAL THERAPY | Age: 81
Discharge: HOME | End: 2021-03-10
Payer: MEDICARE

## 2021-03-10 DIAGNOSIS — R13.10 DYSPHAGIA: ICD-10-CM

## 2021-03-10 PROCEDURE — 92611 MOTION FLUOROSCOPY/SWALLOW: CPT

## 2021-03-10 PROCEDURE — 74230 X-RAY XM SWLNG FUNCJ C+: CPT

## 2021-03-10 PROCEDURE — 74011000250 HC RX REV CODE- 250

## 2021-03-10 RX ADMIN — BARIUM SULFATE 30 ML: 400 PASTE ORAL at 11:58

## 2021-03-10 RX ADMIN — BARIUM SULFATE 700 MG: 700 TABLET ORAL at 11:59

## 2021-03-10 RX ADMIN — BARIUM SULFATE 80 G: 960 POWDER, FOR SUSPENSION ORAL at 11:58

## 2021-03-10 RX ADMIN — BARIUM SULFATE 60 ML: 400 SUSPENSION ORAL at 11:58

## 2021-03-10 NOTE — PROGRESS NOTES
[facility] at [facility]  [address] [facility], [address]  Ph: [phone]    Fax: [phone]    Outpatient Modified Barium Swallow Evaluation    Patient: Ozzy Manning (18 y.o. male)  Date: 3/10/2021  Primary Diagnosis: Dysphagia, unspecified [R13.10]       Precautions: Aspiration    Radiologist: Raina Menjivar    History: Dementia, wife reports intermittent coughing and choking, no PMHx of PNA    Videofluoroscopy Results/Recommendations:  Modified barium swallow study completed with silent and overt aspiration with serial swallows of thin liquid +/- straw. Nectar-thick liquid, puree, solid and 13 mm Ba+ tablet with nectar-thick liquid wash; grossly WNL. Pt presents with moderate pharyngeal dysphagia, as evidenced above, which places pt at risk for aspiration. Deficits include decreased laryngeal elevation/excursion and decreased pharyngeal sensation. At this time, safest for regular solid, thin liquid diet with SINGLE small sips and use of safe swallowing guidelines. If patient is unable to consistently utilize single small sips due to impulsivity/memory deficits, recommend nectar-thick liquids. Recommend outpatient speech therapy intervention to address dysphagia. SLP utilized video of study for visual feedback, education, and recommendations for patient and wife; verbalized comprehension. Video Flouroscopic Procedures  [x] Lateral View   [] A-P View [] Scanned to level of Sternum    [x] Seated at 90 deg.   [] Other:    Presentation:    [x] Spoon   [x] Cup   [x] Straw   [] Syringe   [x] Consecutive Swallows  [] Other:    Consistencies:   [x] Ba+ liquid   [] Ba+ liquid (nectar)   [] Ba+ liquid (honey)   [x] Ba+ puree [x] Ba+ cookie [x] 13 mm Ba pill with NTL Ba wash    Testing Discontinued:   [] Due to:    Treatment Techniques Attempted  [] Head Turn: [] Right [] Left     [] Head Tilt: [] Right [] Left     [] Chin Down:  [x] Small Sips/bites:   [x] Effortful swallow:  [] Double swallow:  [] Other:    Results  Dysphagia Present:     [x] Yes  [] No    Ratings of Dysphagia:     [] Mild  [x] Moderate  [] Severe    Stages of Breakdown:   [] Oral  [x] Pharyngeal  [] Esophageal    Aspiration:   [x] Yes    [] No  [] At Risk     Cough: [x] Yes      [] No     Penetration:   [] Yes    [x] No     Cough: [] Yes      [] No   [] Flash/trace   [] Mod   [] To Chords          Consistency Aspirated:   Consistency Penetrated:   [x] Thin Liquid     [] Thin Liquid  [] Nectar-thick Liquid    [] Nectar-thick Liquid   [] Honey-thick Liquid    [] Honey-thick Liquid   [] Puree     [] Puree  [] Solid     [] Solid  [] 13 mm Ba pill with NTL   [] 13 mm Ba pill with NTL     Motility Problems with:  [] Lip Closure:    [] Mastication:   [] Bolus Formation/control:   [] A-P Transport:  [] Tongue Base Retraction:  [] Swallow Response (delayed):  [] Velopharyngeal Closure:  [x] Pharyngeal Aspirations:  [x] Laryngeal Elevation/adduction:  [] Epiglottic Inversion:  [x] Pharyngeal motility/sensation:  [] Cricopharyngeal Relaxation:  [] Esophageal Peristalsis:  [] Other:    Timing of Aspiration/Penetration:  [] Before Swallow:  [x] During Swallow:  [] After Swallow:    Transit Time Delay:  [] >1 Second  Oral  [] >1 Second Pharyngeal  [] >20 Second Esophageal     Residuals:  [] Vallecula:    [] Mild  [] Mod  [] Severe  [] Pyriform Sinus:   [] Mild  [] Mod  [] Severe  [] Posterior Pharyngeal Wall:  [] Mild  [] Mod  [] Severe    The severity rating is based on the following outcomes:    National Outcomes Measures (NOMS) - DENIS Noms Swallow Level 5  8-point Penetration-Aspiration Scale - Score 8  Professional Judgement    Juan David Velásquez M.S., CCC-SLP  Speech-Language Pathologist    [facility]

## 2021-03-15 ENCOUNTER — HOSPITAL ENCOUNTER (OUTPATIENT)
Dept: PHYSICAL THERAPY | Age: 81
Discharge: HOME | End: 2021-03-15
Payer: MEDICARE

## 2021-03-15 PROCEDURE — 92526 ORAL FUNCTION THERAPY: CPT

## 2021-03-15 NOTE — PROGRESS NOTES
ST DAILY TREATMENT NOTE    Patient Name: Sarah Melara  XVXJ:  : 1940  [x]  Patient  Verified  Payor: Payor: [place] / Plan: [place] MED[place]ARE PART [place] & B / Product Type: Medicare /   In time: 906 Out time: 920  Total Treatment Time (min): 35  Visit #: 2 of 16    Treatment Diagnosis: Dysphagia, unspecified [R13.10]    SUBJECTIVE  Pain Level (0-10 scale): 0  Any medication changes, allergies to medications, adverse drug reactions, diagnosis change, or new procedure performed?: [] No    [x] Yes (see summary sheet for update)    Subjective functional status/changes:   [] No changes reported  MBSS completed 3/10/21 with aspiration of thin liquids with serial swallows. Rec: regular solid, thin liquid diet with SINGLE small sips and use of safe swallowing guidelines. If patient is unable to consistently utilize single small sips due to impulsivity/memory deficits, recommend nectar-thick liquids. OBJECTIVE  Treatment provided includes:  Increase/Improve:  []  Voice Quality []  Cognitive Linguistic Skills [x]  Laryngeal/Pharyngeal Exercises   []  Vocal Loudness []  Reading Comprehension [x]  Swallowing Skills    []  Vocal Cord Function []  Auditory Comprehension []  Oral Motor Skills   []  Resonance []  Writing Skills [x]  Compensatory strategies    []  Speech Intelligibility []  Expressive Language []  Attention   []  Breath Support/Coord.  []  Receptive language []  Memory   []  Articulation []  Safety Awareness []    []  Fluency []  Word Retrieval []      Treatment Provided:  Reviewed results and recommendations from MBSS  Reviewed safe swallowing guidelines, heimlich maneuver  Introduced effortful swallow, ra maneuver and falsetto     Patient/Caregiver  Education: [x] Review HEP      HEP/Handouts given: Laryngeal strengthening exercises    Pain Level (0-10 scale) post treatment: 0    ASSESSMENT   [x]   Improving appropriately and progressing toward goals  []   Improving slowly and progressing toward goals  []   Approximating goals/maximum potential  [x]   Continues to benefit from skilled therapy to address remaining functional deficits  []   Not progressing toward goals and plan of care will be adjusted    Patient will continue to benefit from skilled therapy to address remaining functional deficits: dysphagia    Progress towards goals / Updated goals:  1. Complete pharyngeal swallow exercises (effortful swallow, ra, mendelsohn, supraglottic, super-supraglottic, shaker, as appropriate) in therapy and at home in 4/5 trials with min A given visual/verbal cues to increase pharyngeal muscle strength. Status at evaluation: Suspect mild pharyngeal dysphagia              Current status: Introduced effortful swallow, ra maneuver and falsetto - completed 10 of each with mod-maxA     2.  Demonstrate ability to utilize aspiration/reflux precautions and compensatory strategies (double swallow; diet consistency adjustment; alternate liquids/solids; small sips/bites, meds crushed) to decrease the reported incidences of dysphagia and s/sx of aspiration with min A with visual/verbal cues.               Status at evaluation: Pt's wife reports coughing and choking at home              Current status: Further educated re: safe swallowing guidelines     2. Participate in evaluation of swallow function (i.e. Modified Barium Swallow) as per MD order to assess s/sx of aspiration, ensure diet tolerance and patient safety during meals.               Status at evaluation: Suspect mild pharyngeal dysphagia              Current status: MBS completed 3/10/21    PLAN  [x]  Continue plan of care  []  Modify Goals/Treatment Plan      []  Discharge due to:  [] Other:    GABRIEL Houston 3/15/2021  8:51 AM    Future Appointments   Date Time Provider Caprice Leal   3/17/2021 11:45 AM GABRIEL Rojas [facility]   3/22/2021  9:30 AM GABRIEL Rojas [facility]   3/24/2021 11:45 AM [facility]EL [facility]   3/29/2021  9:30 AM [facility]EL [facility]   3/31/2021 11:45 AM GABRIEL [facility]   4/5/2021 10:15 AM GABRIEL [facility]   4/9/2021  9:30 AM GABRIEL [facility]

## 2021-03-15 NOTE — PROGRESS NOTES
[facility]  [address], [address] [place]  Ph [phone]  Fx [phone]     Progress Report    Patient name: Sarah eMlara Start of Care: 2/10/2021   Referral source: Tatianna Naik NP : 1940   Medical/Treatment Diagnosis: Dysphagia, unspecified [R13.10] Onset Date: Several months ago - progressive           Prior Hospitalization: see medical history Provider#: [id number]   Medications: Verified on Patient Summary List    Comorbidities: Dementia, arthritis, thyroid problems, high blood pressure, TIAs, neck surgery (C3-C7)  Prior Level of Function: Living with wife, no PMHx of dysphagia  Visits from Start of Care: 1    Missed Visits: 0    Reporting Period: 2/10/21 to 3/15/21    Subjective Reports: Patient's wife reports she has been reminding patient to take small sips per MBSS recommendations, states she has noticed less coughing    Goal: Complete pharyngeal swallow exercises (effortful swallow, ra, mendelsohn, supraglottic, super-supraglottic, shaker, as appropriate) in therapy and at home in 4/5 trials with min A given visual/verbal cues to increase pharyngeal muscle strength.    Status at last note/certification: Suspect mild pharyngeal dysphagia  Current Status: not met, progressing - Introduced effortful swallow, ra maneuver and falsetto - completed 10 of each with mod-maxA    Goal: Demonstrate ability to utilize aspiration/reflux precautions and compensatory strategies (double swallow; diet consistency adjustment; alternate liquids/solids; small sips/bites, meds crushed) to decrease the reported incidences of dysphagia and s/sx of aspiration with min A with visual/verbal cues.   Status at last note/certification: Pt's wife reports coughing and choking at home  Current Status: not met, progressing - Patient and wife further educated re: safe swallowing guidelines    Goal: Participate in evaluation of swallow function (i.e. Modified Barium Swallow) as per MD order to assess s/sx of aspiration, ensure diet tolerance and patient safety during meals.   Status at last note/certification: Suspect mild pharyngeal dysphagia  Current Status: met - MBSS completed 3/10/21 with aspiration of thin liquids with serial swallows. Rec: regular solid, thin liquid diet with SINGLE small sips and use of safe swallowing guidelines. If patient is unable to consistently utilize single small sips due to impulsivity/memory deficits, recommend nectar-thick liquids. Key functional changes: MBSS completed 3/10/21 with aspiration of thin liquids with serial swallows. Rec: regular solid, thin liquid diet with SINGLE small sips and use of safe swallowing guidelines. If patient is unable to consistently utilize single small sips due to impulsivity/memory deficits, recommend nectar-thick liquids. Problems/ barriers to goal attainment: Cognitive deficits limiting patient awareness of deficits    Assessment / Recommendations: Continue skilled ST intervention to address dysphagia. Problem List:    []aphasic  []dysarthric  [x]dysphagic       []alexic  []agraphic  []dysphonia       []dysfluency   []Cognitive-Linguistic Disorder       []other      Treatment Plan: Treatment of Swallowing and Patient Education    Patient Goal (s) has been updated and includes: Work on swallowing    Updated Goals to be accomplished in 4 weeks:  1. Complete pharyngeal swallow exercises (effortful swallow, ra, mendelsohn, supraglottic, super-supraglottic, shaker, as appropriate) in therapy and at home in 4/5 trials with min A given visual/verbal cues to increase pharyngeal muscle strength.      2.  Demonstrate ability to utilize aspiration/reflux precautions and compensatory strategies (diet consistency adjustment; small sips/bites) to decrease the reported incidences of dysphagia and s/sx of aspiration with min A with visual/verbal cues.      Frequency / Duration: Patient to be seen 2 times per week for 4 weeks    ASSESSMENT/RECOMMENDATIONS:  [x]Continue therapy per initial plan/protocol at a frequency of  2x per week for 4 weeks  []Continue therapy with the following recommended changes:_____________________ _____________________________ ________________________________________  []Discontinue therapy progressing towards or have reached established goals  []Discontinue therapy due to lack of appreciable progress towards goals  []Discontinue therapy due to lack of attendance or compliance  []Await Physician's recommendations/decisions regarding therapy  []Other:________________________________________________________________    Thank you for this referral.   Elio Gilford, SLP 3/15/2021 9:29 AM

## 2021-03-17 ENCOUNTER — HOSPITAL ENCOUNTER (OUTPATIENT)
Dept: PHYSICAL THERAPY | Age: 81
Discharge: HOME | End: 2021-03-17
Payer: MEDICARE

## 2021-03-17 PROCEDURE — 92526 ORAL FUNCTION THERAPY: CPT

## 2021-03-17 NOTE — PROGRESS NOTES
ST DAILY TREATMENT NOTE Patient Name: Denae Craig Date:3/17/2021 : 1940 [x]  Patient  Verified Payor: Payor: Dari Johnson / Plan: [place] MEDICARE PART A & B / Product Type: Medicare / In time: 1140  Out time: 1215 Total Treatment Time (min):35 Visit #: 3 of 16 Treatment Diagnosis: Dysphagia, unspecified [R13.10] SUBJECTIVE Pain Level (0-10 scale): 0 Any medication changes, allergies to medications, adverse drug reactions, diagnosis change, or new procedure performed?: [x] No    [] Yes (see summary sheet for update) Subjective functional status/changes:   [] No changes reported Patient's wife present for therapy - reports no choking incidents or coughing with meals this week OBJECTIVE Treatment provided includes: 
Increase/Improve: 
[]  Voice Quality []  Cognitive Linguistic Skills [x]  Laryngeal/Pharyngeal Exercises  
[]  Vocal Loudness []  Reading Comprehension [x]  Swallowing Skills   
[]  Vocal Cord Function []  Auditory Comprehension [x]  Oral Motor Skills  
[]  Resonance []  Writing Skills []  Compensatory strategies   
[]  Speech Intelligibility []  Expressive Language []  Attention []  Breath Support/Coord. []  Receptive language []  Memory []  Articulation []  Safety Awareness []   
[]  Fluency []  Word Retrieval [] Treatment Provided: 
Reviewed results and recommendations from [facility] Reviewed safe swallowing guidelines, created handout Introduced effortful swallow, ra maneuver and falsetto  
 
Patient/Caregiver  Education: [x] Review HEP     
HEP/Handouts given: Laryngeal strengthening exercises Pain Level (0-10 scale) post treatment: 0 
 
ASSESSMENT [x]   Improving appropriately and progressing toward goals 
[]   Improving slowly and progressing toward goals 
[]   Approximating goals/maximum potential 
[x]   Continues to benefit from skilled therapy to address remaining functional deficits []   Not progressing toward goals and plan of care will be adjusted Patient will continue to benefit from skilled therapy to address remaining functional deficits: dysphagia Progress towards goals / Updated goals: 1. Complete pharyngeal swallow exercises (effortful swallow, ra, mendelsohn, supraglottic, super-supraglottic, shaker, as appropriate) in therapy and at home in 4/5 trials with min A given visual/verbal cues to increase pharyngeal muscle strength.  
            Status at evaluation: Suspect mild pharyngeal dysphagia 
            Current status: Completed effortful swallow, ra maneuver and falsetto - completed 10 of each with mod-maxA 
  
2. Demonstrate ability to utilize aspiration/reflux precautions and compensatory strategies (double swallow; diet consistency adjustment; alternate liquids/solids; small sips/bites, meds crushed) to decrease the reported incidences of dysphagia and s/sx of aspiration with min A with visual/verbal cues.   
            Status at evaluation: Pt's wife reports coughing and choking at home 
            Current status: Further educated re: safe swallowing guidelines, patient utilized small sips in 8/8 trials with Asif, 0 overt s/sx of aspiration 
  
 
PLAN [x]  Continue plan of care 
[]  Modify Goals/Treatment Plan     
[]  Discharge due to: 
[] Other: 
 
GABRIEL Escalante 3/17/2021  12:13 PM 
 
Future Appointments Date Time Provider Caprice Leal 3/22/2021  9:30 AM GABRIEL Schreiber [facility]  
3/24/2021 11:45 AM GABRIEL Schreiber [facility]  
3/29/2021  9:30 AM GABRIEL Schreiber [facility]  
3/31/2021 11:45 AM GABRIEL Schreiber [facility]  
4/5/2021 10:15 AM GABRIEL Schreiber [facility]  
4/9/2021  9:30 AM GABRIEL Schreiber [facility]

## 2021-03-17 NOTE — PROGRESS NOTES
ST DAILY TREATMENT NOTE    Patient Name: Franco Fry  Date:3/17/2021  : 1940  [x]  Patient  Verified  Payor: Payor: [place] MEDICARE / Plan: [place] MEDICARE PART A & B / Product Type: Medicare /   In time: 303  Out time: 1510  Total Treatment Time (min): 35  Visit #: 3 of 16    Treatment Diagnosis: Dysphagia, unspecified [R13.10]    SUBJECTIVE  Pain Level (0-10 scale): 0  Any medication changes, allergies to medications, adverse drug reactions, diagnosis change, or new procedure performed?: [x] No    [] Yes (see summary sheet for update)    Subjective functional status/changes:   [] No changes reported  Patient's wife present for therapy. No choking reported. OBJECTIVE  Treatment provided includes:  Increase/Improve:  []  Voice Quality []  Cognitive Linguistic Skills [x]  Laryngeal/Pharyngeal Exercises   []  Vocal Loudness []  Reading Comprehension [x]  Swallowing Skills    []  Vocal Cord Function []  Auditory Comprehension [x]  Oral Motor Skills   []  Resonance []  Writing Skills []  Compensatory strategies    []  Speech Intelligibility []  Expressive Language []  Attention   []  Breath Support/Coord.  []  Receptive language []  Memory   []  Articulation []  Safety Awareness []    []  Fluency []  Word Retrieval []      Treatment Provided:  Reviewed results and recommendations from MBSS  Reviewed safe swallowing guidelines  Introduced effortful swallow, ra maneuver and falsetto     Patient/Caregiver  Education: [x] Review HEP      HEP/Handouts given: Laryngeal strengthening exercises    Pain Level (0-10 scale) post treatment: 0    ASSESSMENT   [x]   Improving appropriately and progressing toward goals  []   Improving slowly and progressing toward goals  []   Approximating goals/maximum potential  [x]   Continues to benefit from skilled therapy to address remaining functional deficits  []   Not progressing toward goals and plan of care will be adjusted    Patient will continue to benefit from skilled therapy to address remaining functional deficits: dysphagia    Progress towards goals / Updated goals:  1. Complete pharyngeal swallow exercises (effortful swallow, ra, mendelsohn, supraglottic, super-supraglottic, shaker, as appropriate) in therapy and at home in 4/5 trials with min A given visual/verbal cues to increase pharyngeal muscle strength.               Status at evaluation: Suspect mild pharyngeal dysphagia              Current status: Completed effortful swallow, ra maneuver and falsetto - completed 10 of each with mod-maxA     2. Demonstrate ability to utilize aspiration/reflux precautions and compensatory strategies (double swallow; diet consistency adjustment; alternate liquids/solids; small sips/bites, meds crushed) to decrease the reported incidences of dysphagia and s/sx of aspiration with min A with visual/verbal cues.                Status at evaluation: Pt's wife reports coughing and choking at home              Current status: Further educated re: safe swallowing guidelines    PLAN  [x]  Continue plan of care  []  Modify Goals/Treatment Plan      []  Discharge due to:  [] Other:    GABRIEL St 3/17/2021  11:36 AM    Future Appointments   Date Time Provider Caprice Leal   3/17/2021 11:45 AM GABRIEL Whitten [facility]   3/22/2021  9:30 AM GABRIEL Whitten [facility]   3/24/2021 11:45 AM GABRIEL Whitten [facility]   3/29/2021  9:30 AM GABRIEL Whitten [facility]   3/31/2021 11:45 AM GABRIEL Whitten [facility]   4/5/2021 10:15 AM GABRIEL Whitten [facility]   4/9/2021  9:30 AM GABRIEL Whitten [facility]

## 2021-03-22 ENCOUNTER — APPOINTMENT (OUTPATIENT)
Dept: PHYSICAL THERAPY | Age: 81
End: 2021-03-22
Payer: MEDICARE

## 2021-03-24 ENCOUNTER — HOSPITAL ENCOUNTER (OUTPATIENT)
Dept: PHYSICAL THERAPY | Age: 81
Discharge: HOME | End: 2021-03-24
Payer: MEDICARE

## 2021-03-26 NOTE — PROGRESS NOTES
[facility]  [address], [address] [facility]  Ph [phone]  Fx [phone]    Speech Therapy Discharge Summary    Patient name: Kevin Zhang Start of Care: 2/10/21   Referral source: Rigoberto Montelongo NP : 1940   Medical/Treatment Diagnosis: Dysphagia, unspecified [R13.10] Onset Date: Several months ago - progressive          Prior Hospitalization: see medical history Provider#: [id number]   Medications: Verified on Patient Summary List    Comorbidities: Dementia, arthritis, thyroid problems, high blood pressure, TIAs, neck surgery (C3-C7)  Prior Level of Function: Living with wife, no PMHx of dysphagia    Visits from Start of Care: 3    Missed Visits: 0    Reporting Period : 2/10/21 to 3/17/21    Summary of Care:  Goal: Complete pharyngeal swallow exercises (effortful swallow, ra, mendelsohn, supraglottic, super-supraglottic, shaker, as appropriate) in therapy and at home in 4/5 trials with min A given visual/verbal cues to increase pharyngeal muscle strength.   Status at last note/certification: Suspect mild pharyngeal dysphagia  Status at discharge: not met    Goal: Demonstrate ability to utilize aspiration/reflux precautions and compensatory strategies (double swallow; diet consistency adjustment; alternate liquids/solids; small sips/bites, meds crushed) to decrease the reported incidences of dysphagia and s/sx of aspiration with min A with visual/verbal cues.   Status at last note/certification: Pt's wife reports coughing and choking at home  Status at discharge: not met    Goal: Participate in evaluation of swallow function (i.e. Modified Barium Swallow) as per MD order to assess s/sx of aspiration, ensure diet tolerance and patient safety during meals.   Status at last note/certification: Suspect mild pharyngeal dysphagia  Status at discharge: met    ASSESSMENT: The patient has abdicated from dysphagia intervention.  MBSS completed 3/10/21 with aspiration of thin liquids with serial swallows. Rec: regular solid, thin liquid diet with SINGLE small sips and use of safe swallowing guidelines. If patient is unable to consistently utilize single small sips due to impulsivity/memory deficits, recommend nectar-thick liquids. The patient and his wife attended 3 visits to address dysphagia. They participated in education regarding safe swallowing strategies and aspiration risks. The patient then stated he would like to discontinue further therapy. Will discharge accordingly.       RECOMMENDATIONS:  [x]Discontinue therapy: []Patient has reached or is progressing toward set goals      [x]Patient is non-compliant or has abdicated      []Due to lack of appreciable progress towards set goals    GABRIEL Serna 3/26/2021 8:26 AM

## 2021-03-29 ENCOUNTER — APPOINTMENT (OUTPATIENT)
Dept: PHYSICAL THERAPY | Age: 81
End: 2021-03-29
Payer: MEDICARE

## 2021-03-31 ENCOUNTER — APPOINTMENT (OUTPATIENT)
Dept: PHYSICAL THERAPY | Age: 81
End: 2021-03-31
Payer: MEDICARE

## 2021-04-05 ENCOUNTER — APPOINTMENT (OUTPATIENT)
Dept: PHYSICAL THERAPY | Age: 81
End: 2021-04-05

## 2021-04-09 ENCOUNTER — APPOINTMENT (OUTPATIENT)
Dept: PHYSICAL THERAPY | Age: 81
End: 2021-04-09
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
## 2022-03-19 PROBLEM — M50.30 DDD (DEGENERATIVE DISC DISEASE), CERVICAL: Status: ACTIVE | Noted: 2017-03-02

## 2023-01-27 ENCOUNTER — HOSPITAL ENCOUNTER (EMERGENCY)
Age: 83
Discharge: HOME | DRG: 947 | End: 2023-01-27
Attending: EMERGENCY MEDICINE
Payer: MEDICARE

## 2023-01-27 ENCOUNTER — APPOINTMENT (OUTPATIENT)
Dept: CT IMAGING | Age: 83
DRG: 947 | End: 2023-01-27
Attending: EMERGENCY MEDICINE
Payer: MEDICARE

## 2023-01-27 ENCOUNTER — APPOINTMENT (OUTPATIENT)
Dept: GENERAL RADIOLOGY | Age: 83
DRG: 947 | End: 2023-01-27
Attending: EMERGENCY MEDICINE
Payer: MEDICARE

## 2023-01-27 VITALS
RESPIRATION RATE: 23 BRPM | DIASTOLIC BLOOD PRESSURE: 80 MMHG | HEART RATE: 82 BPM | SYSTOLIC BLOOD PRESSURE: 130 MMHG | OXYGEN SATURATION: 94 % | TEMPERATURE: 97.9 F | WEIGHT: 180 LBS | BODY MASS INDEX: 29.05 KG/M2

## 2023-01-27 DIAGNOSIS — U07.1 COVID: Primary | ICD-10-CM

## 2023-01-27 DIAGNOSIS — F03.B0 MODERATE DEMENTIA WITHOUT BEHAVIORAL DISTURBANCE, PSYCHOTIC DISTURBANCE, MOOD DISTURBANCE, OR ANXIETY, UNSPECIFIED DEMENTIA TYPE: ICD-10-CM

## 2023-01-27 LAB
ALBUMIN SERPL-MCNC: 3.4 G/DL (ref 3.4–5)
ALBUMIN/GLOB SERPL: 0.9 (ref 0.8–1.7)
ALP SERPL-CCNC: 150 U/L (ref 45–117)
ALT SERPL-CCNC: 61 U/L (ref 16–61)
ANION GAP SERPL CALC-SCNC: 6 MMOL/L (ref 3–18)
APPEARANCE UR: CLEAR
AST SERPL-CCNC: 79 U/L (ref 10–38)
BASOPHILS # BLD: 0 K/UL (ref 0–0.1)
BASOPHILS NFR BLD: 1 % (ref 0–2)
BILIRUB SERPL-MCNC: 0.7 MG/DL (ref 0.2–1)
BILIRUB UR QL: NEGATIVE
BUN SERPL-MCNC: 13 MG/DL (ref 7–18)
BUN/CREAT SERPL: 13 (ref 12–20)
CALCIUM SERPL-MCNC: 8.5 MG/DL (ref 8.5–10.1)
CHLORIDE SERPL-SCNC: 105 MMOL/L (ref 100–111)
CO2 SERPL-SCNC: 31 MMOL/L (ref 21–32)
COLOR UR: YELLOW
CREAT SERPL-MCNC: 0.99 MG/DL (ref 0.6–1.3)
DIFFERENTIAL METHOD BLD: ABNORMAL
EOSINOPHIL # BLD: 0 K/UL (ref 0–0.4)
EOSINOPHIL NFR BLD: 1 % (ref 0–5)
ERYTHROCYTE [DISTWIDTH] IN BLOOD BY AUTOMATED COUNT: 14 % (ref 11.6–14.5)
FLUAV RNA SPEC QL NAA+PROBE: NOT DETECTED
FLUBV RNA SPEC QL NAA+PROBE: NOT DETECTED
GLOBULIN SER CALC-MCNC: 3.9 G/DL (ref 2–4)
GLUCOSE SERPL-MCNC: 96 MG/DL (ref 74–99)
GLUCOSE UR STRIP.AUTO-MCNC: NEGATIVE MG/DL
HCT VFR BLD AUTO: 47.7 % (ref 36–48)
HGB BLD-MCNC: 14.9 G/DL (ref 13–16)
HGB UR QL STRIP: NEGATIVE
IMM GRANULOCYTES # BLD AUTO: 0 K/UL (ref 0–0.04)
IMM GRANULOCYTES NFR BLD AUTO: 0 % (ref 0–0.5)
KETONES UR QL STRIP.AUTO: NEGATIVE MG/DL
LACTATE BLD-SCNC: 0.52 MMOL/L (ref 0.4–2)
LEUKOCYTE ESTERASE UR QL STRIP.AUTO: NEGATIVE
LYMPHOCYTES # BLD: 0.6 K/UL (ref 0.9–3.6)
LYMPHOCYTES NFR BLD: 16 % (ref 21–52)
MAGNESIUM SERPL-MCNC: 2.1 MG/DL (ref 1.6–2.6)
MCH RBC QN AUTO: 29.6 PG (ref 24–34)
MCHC RBC AUTO-ENTMCNC: 31.2 G/DL (ref 31–37)
MCV RBC AUTO: 94.8 FL (ref 78–100)
MONOCYTES # BLD: 0.8 K/UL (ref 0.05–1.2)
MONOCYTES NFR BLD: 21 % (ref 3–10)
NEUTS SEG # BLD: 2.4 K/UL (ref 1.8–8)
NEUTS SEG NFR BLD: 61 % (ref 40–73)
NITRITE UR QL STRIP.AUTO: NEGATIVE
NRBC # BLD: 0 K/UL (ref 0–0.01)
NRBC BLD-RTO: 0 PER 100 WBC
PH UR STRIP: 7 (ref 5–8)
PLATELET # BLD AUTO: 130 K/UL (ref 135–420)
PMV BLD AUTO: 9.5 FL (ref 9.2–11.8)
POTASSIUM SERPL-SCNC: 3.8 MMOL/L (ref 3.5–5.5)
PROT SERPL-MCNC: 7.3 G/DL (ref 6.4–8.2)
PROT UR STRIP-MCNC: NEGATIVE MG/DL
RBC # BLD AUTO: 5.03 M/UL (ref 4.35–5.65)
SARS-COV-2, COV2: DETECTED
SODIUM SERPL-SCNC: 142 MMOL/L (ref 136–145)
SP GR UR REFRACTOMETRY: 1.02 (ref 1–1.03)
UROBILINOGEN UR QL STRIP.AUTO: 1 EU/DL (ref 0.2–1)
WBC # BLD AUTO: 3.9 K/UL (ref 4.6–13.2)

## 2023-01-27 PROCEDURE — 83605 ASSAY OF LACTIC ACID: CPT

## 2023-01-27 PROCEDURE — 80053 COMPREHEN METABOLIC PANEL: CPT

## 2023-01-27 PROCEDURE — 70450 CT HEAD/BRAIN W/O DYE: CPT

## 2023-01-27 PROCEDURE — 74011250636 HC RX REV CODE- 250/636: Performed by: EMERGENCY MEDICINE

## 2023-01-27 PROCEDURE — 71045 X-RAY EXAM CHEST 1 VIEW: CPT

## 2023-01-27 PROCEDURE — 83735 ASSAY OF MAGNESIUM: CPT

## 2023-01-27 PROCEDURE — 85025 COMPLETE CBC W/AUTO DIFF WBC: CPT

## 2023-01-27 PROCEDURE — 87636 SARSCOV2 & INF A&B AMP PRB: CPT

## 2023-01-27 PROCEDURE — 81003 URINALYSIS AUTO W/O SCOPE: CPT

## 2023-01-27 PROCEDURE — 93005 ELECTROCARDIOGRAM TRACING: CPT

## 2023-01-27 PROCEDURE — 99285 EMERGENCY DEPT VISIT HI MDM: CPT

## 2023-01-27 RX ADMIN — SODIUM CHLORIDE 1000 ML: 9 INJECTION, SOLUTION INTRAVENOUS at 08:36

## 2023-01-27 NOTE — ED NOTES
Pt presents with wife for worsening confusion.  LKW- Wednesday midnight  Per wife, pt unable to name grand kids as usual  H/o dementia  Pt alert, disoriented to time, follows commands, no focal deficits noted in triage
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
## 2023-01-29 LAB
CALCULATED R AXIS, ECG10: -3 DEGREES
CALCULATED T AXIS, ECG11: 60 DEGREES
DIAGNOSIS, 93000: NORMAL
Q-T INTERVAL, ECG07: 392 MS
QRS DURATION, ECG06: 86 MS
QTC CALCULATION (BEZET), ECG08: 443 MS
VENTRICULAR RATE, ECG03: 77 BPM

## 2023-01-30 ENCOUNTER — APPOINTMENT (OUTPATIENT)
Dept: CT IMAGING | Age: 83
DRG: 947 | End: 2023-01-30
Attending: EMERGENCY MEDICINE
Payer: MEDICARE

## 2023-01-30 ENCOUNTER — HOSPITAL ENCOUNTER (INPATIENT)
Age: 83
LOS: 4 days | Discharge: NURSING, REHAB OR LONG-TERM CARE | DRG: 947 | End: 2023-02-03
Attending: EMERGENCY MEDICINE | Admitting: FAMILY MEDICINE
Payer: MEDICARE

## 2023-01-30 ENCOUNTER — APPOINTMENT (OUTPATIENT)
Dept: GENERAL RADIOLOGY | Age: 83
DRG: 947 | End: 2023-01-30
Attending: EMERGENCY MEDICINE
Payer: MEDICARE

## 2023-01-30 DIAGNOSIS — U07.1 COVID-19: Primary | ICD-10-CM

## 2023-01-30 DIAGNOSIS — R53.1 WEAKNESS: ICD-10-CM

## 2023-01-30 PROBLEM — R26.2 INABILITY TO WALK: Status: ACTIVE | Noted: 2023-01-30

## 2023-01-30 LAB
ALBUMIN SERPL-MCNC: 3.2 G/DL (ref 3.4–5)
ALBUMIN/GLOB SERPL: 0.8 (ref 0.8–1.7)
ALP SERPL-CCNC: 154 U/L (ref 45–117)
ALT SERPL-CCNC: 76 U/L (ref 16–61)
ANION GAP SERPL CALC-SCNC: 3 MMOL/L (ref 3–18)
APPEARANCE UR: CLEAR
AST SERPL-CCNC: 101 U/L (ref 10–38)
BASOPHILS # BLD: 0 K/UL (ref 0–0.1)
BASOPHILS NFR BLD: 1 % (ref 0–2)
BILIRUB SERPL-MCNC: 0.5 MG/DL (ref 0.2–1)
BILIRUB UR QL: NEGATIVE
BUN SERPL-MCNC: 11 MG/DL (ref 7–18)
BUN/CREAT SERPL: 13 (ref 12–20)
CALCIUM SERPL-MCNC: 8.7 MG/DL (ref 8.5–10.1)
CHLORIDE SERPL-SCNC: 108 MMOL/L (ref 100–111)
CO2 SERPL-SCNC: 32 MMOL/L (ref 21–32)
COLOR UR: YELLOW
CREAT SERPL-MCNC: 0.83 MG/DL (ref 0.6–1.3)
DIFFERENTIAL METHOD BLD: ABNORMAL
EOSINOPHIL # BLD: 0.1 K/UL (ref 0–0.4)
EOSINOPHIL NFR BLD: 3 % (ref 0–5)
ERYTHROCYTE [DISTWIDTH] IN BLOOD BY AUTOMATED COUNT: 13.4 % (ref 11.6–14.5)
GLOBULIN SER CALC-MCNC: 4 G/DL (ref 2–4)
GLUCOSE SERPL-MCNC: 91 MG/DL (ref 74–99)
GLUCOSE UR STRIP.AUTO-MCNC: NEGATIVE MG/DL
HCT VFR BLD AUTO: 46.3 % (ref 36–48)
HGB BLD-MCNC: 14.7 G/DL (ref 13–16)
HGB UR QL STRIP: NEGATIVE
IMM GRANULOCYTES # BLD AUTO: 0 K/UL (ref 0–0.04)
IMM GRANULOCYTES NFR BLD AUTO: 0 % (ref 0–0.5)
KETONES UR QL STRIP.AUTO: NEGATIVE MG/DL
LEUKOCYTE ESTERASE UR QL STRIP.AUTO: NEGATIVE
LYMPHOCYTES # BLD: 1.1 K/UL (ref 0.9–3.6)
LYMPHOCYTES NFR BLD: 40 % (ref 21–52)
MCH RBC QN AUTO: 30 PG (ref 24–34)
MCHC RBC AUTO-ENTMCNC: 31.7 G/DL (ref 31–37)
MCV RBC AUTO: 94.5 FL (ref 78–100)
MONOCYTES # BLD: 0.4 K/UL (ref 0.05–1.2)
MONOCYTES NFR BLD: 16 % (ref 3–10)
NEUTS SEG # BLD: 1 K/UL (ref 1.8–8)
NEUTS SEG NFR BLD: 39 % (ref 40–73)
NITRITE UR QL STRIP.AUTO: NEGATIVE
NRBC # BLD: 0 K/UL (ref 0–0.01)
NRBC BLD-RTO: 0 PER 100 WBC
PH UR STRIP: 7.5 (ref 5–8)
PLATELET # BLD AUTO: 139 K/UL (ref 135–420)
PMV BLD AUTO: 9.4 FL (ref 9.2–11.8)
POTASSIUM SERPL-SCNC: 4.4 MMOL/L (ref 3.5–5.5)
PROT SERPL-MCNC: 7.2 G/DL (ref 6.4–8.2)
PROT UR STRIP-MCNC: NEGATIVE MG/DL
RBC # BLD AUTO: 4.9 M/UL (ref 4.35–5.65)
SODIUM SERPL-SCNC: 143 MMOL/L (ref 136–145)
SP GR UR REFRACTOMETRY: 1.01 (ref 1–1.03)
TROPONIN-HIGH SENSITIVITY: 7 NG/L (ref 0–78)
UROBILINOGEN UR QL STRIP.AUTO: 0.2 EU/DL (ref 0.2–1)
WBC # BLD AUTO: 2.7 K/UL (ref 4.6–13.2)

## 2023-01-30 PROCEDURE — 85025 COMPLETE CBC W/AUTO DIFF WBC: CPT

## 2023-01-30 PROCEDURE — 70450 CT HEAD/BRAIN W/O DYE: CPT

## 2023-01-30 PROCEDURE — 93005 ELECTROCARDIOGRAM TRACING: CPT

## 2023-01-30 PROCEDURE — 65270000029 HC RM PRIVATE

## 2023-01-30 PROCEDURE — 80053 COMPREHEN METABOLIC PANEL: CPT

## 2023-01-30 PROCEDURE — 99285 EMERGENCY DEPT VISIT HI MDM: CPT

## 2023-01-30 PROCEDURE — 71045 X-RAY EXAM CHEST 1 VIEW: CPT

## 2023-01-30 PROCEDURE — 74011000250 HC RX REV CODE- 250: Performed by: FAMILY MEDICINE

## 2023-01-30 PROCEDURE — 81003 URINALYSIS AUTO W/O SCOPE: CPT

## 2023-01-30 PROCEDURE — 84484 ASSAY OF TROPONIN QUANT: CPT

## 2023-01-30 RX ORDER — ONDANSETRON 4 MG/1
4 TABLET, ORALLY DISINTEGRATING ORAL
Status: DISCONTINUED | OUTPATIENT
Start: 2023-01-30 | End: 2023-02-03 | Stop reason: HOSPADM

## 2023-01-30 RX ORDER — DONEPEZIL HYDROCHLORIDE 10 MG/1
10 TABLET, FILM COATED ORAL
COMMUNITY
Start: 2023-01-16

## 2023-01-30 RX ORDER — ACETAMINOPHEN 325 MG/1
650 TABLET ORAL
Status: DISCONTINUED | OUTPATIENT
Start: 2023-01-30 | End: 2023-02-03 | Stop reason: HOSPADM

## 2023-01-30 RX ORDER — ENOXAPARIN SODIUM 100 MG/ML
40 INJECTION SUBCUTANEOUS DAILY
Status: DISCONTINUED | OUTPATIENT
Start: 2023-01-31 | End: 2023-02-03 | Stop reason: HOSPADM

## 2023-01-30 RX ORDER — ONDANSETRON 2 MG/ML
4 INJECTION INTRAMUSCULAR; INTRAVENOUS
Status: DISCONTINUED | OUTPATIENT
Start: 2023-01-30 | End: 2023-02-03 | Stop reason: HOSPADM

## 2023-01-30 RX ORDER — ACETAMINOPHEN 650 MG/1
650 SUPPOSITORY RECTAL
Status: DISCONTINUED | OUTPATIENT
Start: 2023-01-30 | End: 2023-02-03 | Stop reason: HOSPADM

## 2023-01-30 RX ORDER — POLYETHYLENE GLYCOL 3350 17 G/17G
17 POWDER, FOR SOLUTION ORAL DAILY PRN
Status: DISCONTINUED | OUTPATIENT
Start: 2023-01-30 | End: 2023-02-03 | Stop reason: HOSPADM

## 2023-01-30 RX ORDER — OMEPRAZOLE 20 MG/1
20 TABLET, DELAYED RELEASE ORAL EVERY MORNING
COMMUNITY
Start: 2023-01-30

## 2023-01-30 RX ORDER — SODIUM CHLORIDE 0.9 % (FLUSH) 0.9 %
5-40 SYRINGE (ML) INJECTION AS NEEDED
Status: DISCONTINUED | OUTPATIENT
Start: 2023-01-30 | End: 2023-02-03 | Stop reason: HOSPADM

## 2023-01-30 RX ORDER — BUDESONIDE AND FORMOTEROL FUMARATE DIHYDRATE 160; 4.5 UG/1; UG/1
2 AEROSOL RESPIRATORY (INHALATION)
COMMUNITY
Start: 2023-01-18

## 2023-01-30 RX ORDER — FUROSEMIDE 40 MG/1
40 TABLET ORAL
COMMUNITY

## 2023-01-30 RX ORDER — SODIUM CHLORIDE 0.9 % (FLUSH) 0.9 %
5-40 SYRINGE (ML) INJECTION EVERY 8 HOURS
Status: DISCONTINUED | OUTPATIENT
Start: 2023-01-30 | End: 2023-02-03 | Stop reason: HOSPADM

## 2023-01-30 RX ADMIN — SODIUM CHLORIDE, PRESERVATIVE FREE 5 ML: 5 INJECTION INTRAVENOUS at 22:00

## 2023-01-30 NOTE — Clinical Note
Status[de-identified] INPATIENT [101]   Type of Bed: Medical [8]   Cardiac Monitoring Required?: No   Inpatient Hospitalization Certified Necessary for the Following Reasons: 3.  Patient receiving treatment that can only be provided in an inpatient setting (further clarification in H&P documentation)   Admitting Diagnosis: Weakness [267533]   Admitting Physician: Gladys Peterson [[id number]]   Attending Physician: Gladys Peterson [[id number]]   Estimated Length of Stay: 2 Midnights   Discharge Plan[de-identified] [facility]
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
## 2023-01-30 NOTE — ED NOTES
Pt ambulation attempted but unsuccessful at this time. PT unable to sit up with feet flat on the ground w/o assistance.  Nurse notified

## 2023-01-31 PROBLEM — F03.90 DEMENTIA (HCC): Status: ACTIVE | Noted: 2023-01-31

## 2023-01-31 PROBLEM — E03.9 ACQUIRED HYPOTHYROIDISM: Status: ACTIVE | Noted: 2023-01-31

## 2023-01-31 PROBLEM — I10 HYPERTENSION: Status: ACTIVE | Noted: 2023-01-31

## 2023-01-31 LAB
ANION GAP SERPL CALC-SCNC: 7 MMOL/L (ref 3–18)
BUN SERPL-MCNC: 10 MG/DL (ref 7–18)
BUN/CREAT SERPL: 15 (ref 12–20)
CALCIUM SERPL-MCNC: 8.2 MG/DL (ref 8.5–10.1)
CHLORIDE SERPL-SCNC: 111 MMOL/L (ref 100–111)
CO2 SERPL-SCNC: 27 MMOL/L (ref 21–32)
CREAT SERPL-MCNC: 0.67 MG/DL (ref 0.6–1.3)
ERYTHROCYTE [DISTWIDTH] IN BLOOD BY AUTOMATED COUNT: 13.6 % (ref 11.6–14.5)
GLUCOSE SERPL-MCNC: 81 MG/DL (ref 74–99)
HCT VFR BLD AUTO: 42.1 % (ref 36–48)
HGB BLD-MCNC: 13.5 G/DL (ref 13–16)
MCH RBC QN AUTO: 30.2 PG (ref 24–34)
MCHC RBC AUTO-ENTMCNC: 32.1 G/DL (ref 31–37)
MCV RBC AUTO: 94.2 FL (ref 78–100)
NRBC # BLD: 0 K/UL (ref 0–0.01)
NRBC BLD-RTO: 0 PER 100 WBC
PLATELET # BLD AUTO: 136 K/UL (ref 135–420)
PMV BLD AUTO: 10.1 FL (ref 9.2–11.8)
POTASSIUM SERPL-SCNC: 3.8 MMOL/L (ref 3.5–5.5)
RBC # BLD AUTO: 4.47 M/UL (ref 4.35–5.65)
SODIUM SERPL-SCNC: 145 MMOL/L (ref 136–145)
WBC # BLD AUTO: 2.6 K/UL (ref 4.6–13.2)

## 2023-01-31 PROCEDURE — 74011250637 HC RX REV CODE- 250/637: Performed by: HOSPITALIST

## 2023-01-31 PROCEDURE — 92610 EVALUATE SWALLOWING FUNCTION: CPT

## 2023-01-31 PROCEDURE — 74011000250 HC RX REV CODE- 250: Performed by: FAMILY MEDICINE

## 2023-01-31 PROCEDURE — 36415 COLL VENOUS BLD VENIPUNCTURE: CPT

## 2023-01-31 PROCEDURE — 80048 BASIC METABOLIC PNL TOTAL CA: CPT

## 2023-01-31 PROCEDURE — 65270000029 HC RM PRIVATE

## 2023-01-31 PROCEDURE — 85027 COMPLETE CBC AUTOMATED: CPT

## 2023-01-31 PROCEDURE — 92526 ORAL FUNCTION THERAPY: CPT

## 2023-01-31 PROCEDURE — 74011250636 HC RX REV CODE- 250/636: Performed by: FAMILY MEDICINE

## 2023-01-31 PROCEDURE — 99222 1ST HOSP IP/OBS MODERATE 55: CPT | Performed by: NURSE PRACTITIONER

## 2023-01-31 RX ORDER — TAMSULOSIN HYDROCHLORIDE 0.4 MG/1
0.4 CAPSULE ORAL DAILY
Status: DISCONTINUED | OUTPATIENT
Start: 2023-02-01 | End: 2023-02-03 | Stop reason: HOSPADM

## 2023-01-31 RX ORDER — METOPROLOL SUCCINATE 25 MG/1
25 TABLET, EXTENDED RELEASE ORAL DAILY
Status: DISCONTINUED | OUTPATIENT
Start: 2023-02-01 | End: 2023-02-03 | Stop reason: HOSPADM

## 2023-01-31 RX ORDER — ASPIRIN 325 MG
325 TABLET ORAL DAILY
Status: DISCONTINUED | OUTPATIENT
Start: 2023-02-01 | End: 2023-02-03 | Stop reason: HOSPADM

## 2023-01-31 RX ORDER — LEVOTHYROXINE SODIUM 50 UG/1
50 TABLET ORAL
Status: DISCONTINUED | OUTPATIENT
Start: 2023-02-01 | End: 2023-02-03 | Stop reason: HOSPADM

## 2023-01-31 RX ORDER — DONEPEZIL HYDROCHLORIDE 5 MG/1
10 TABLET, FILM COATED ORAL
Status: DISCONTINUED | OUTPATIENT
Start: 2023-01-31 | End: 2023-02-03 | Stop reason: HOSPADM

## 2023-01-31 RX ORDER — ATORVASTATIN CALCIUM 20 MG/1
40 TABLET, FILM COATED ORAL
Status: DISCONTINUED | OUTPATIENT
Start: 2023-01-31 | End: 2023-02-03 | Stop reason: HOSPADM

## 2023-01-31 RX ADMIN — ENOXAPARIN SODIUM 40 MG: 100 INJECTION SUBCUTANEOUS at 08:10

## 2023-01-31 RX ADMIN — SODIUM CHLORIDE, PRESERVATIVE FREE 10 ML: 5 INJECTION INTRAVENOUS at 14:45

## 2023-01-31 RX ADMIN — SODIUM CHLORIDE, PRESERVATIVE FREE 5 ML: 5 INJECTION INTRAVENOUS at 06:00

## 2023-01-31 RX ADMIN — SODIUM CHLORIDE, PRESERVATIVE FREE 10 ML: 5 INJECTION INTRAVENOUS at 21:55

## 2023-01-31 RX ADMIN — DONEPEZIL HYDROCHLORIDE 10 MG: 5 TABLET, FILM COATED ORAL at 21:57

## 2023-01-31 RX ADMIN — ATORVASTATIN CALCIUM 40 MG: 20 TABLET, FILM COATED ORAL at 21:55

## 2023-01-31 NOTE — ROUTINE PROCESS
Bedside and Verbal shift change report given to Lee Knight and Jesenia RN (oncoming nurse) by Mike Martinez (offgoing nurse). Report included the following information SBAR, Kardex, Intake/Output, MAR, and Recent Results.

## 2023-01-31 NOTE — PROGRESS NOTES
[facility],8W  Paged Dr. Daysi Gallo. Cardiac monitoring . 1559  Verbal order with readback: cardiac monitoring for 72 hrs d/t covid.

## 2023-01-31 NOTE — ROUTINE PROCESS
TRANSFER - OUT REPORT:    Verbal report given to Connie(name) on [facility]  being transferred to medical(unit) for routine progression of care       Report consisted of patients Situation, Background, Assessment and   Recommendations(SBAR). Information from the following report(s) SBAR, ED Summary, MAR, Recent Results, and Med Rec Status was reviewed with the receiving nurse. Lines:   Peripheral IV 01/30/23 Left Antecubital (Active)   Site Assessment Clean, dry, & intact 01/30/23 1650   Phlebitis Assessment 0 01/30/23 1650   Infiltration Assessment 0 01/30/23 1650   Dressing Status Clean, dry, & intact 01/30/23 1650   Action Taken Blood drawn 01/30/23 1650        Opportunity for questions and clarification was provided.       Patient transported with:   [employer]

## 2023-01-31 NOTE — PROGRESS NOTES
Patient arrived on the unit in stretcher with wife. No acute distress. Pt assessed and wife Orval Kenner, assisted with the admission process. Pt medications reconciled and wife took coat and clothes home and left the patient's shoes and coat in closet and glasses at bedside and soiled home clothes were taken home with Wife. Pt assisted with clothing by MELINDA Charles,  complaint of minor pain upon palpation of bilateral knees. Bruises on knees stated to have resulted from previous falls. Advance care directive copied. Wife addressed concern of slurred speech and difficulty swallowing and would like a speech consult for the pt.
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
## 2023-01-31 NOTE — PROGRESS NOTES
Hospitalist Progress Note    Patient: Ashley Canchola MRN: [id number]  CSN: [id number]    YOB: 1940  Age: [de-identified] y.o. Sex: male    DOA: 1/30/2023 LOS:  LOS: 1 day                Assessment/Plan     Patient Active Problem List   Diagnosis Code    DDD (degenerative disc disease), cervical M50.30    Weakness R53.1    Inability to walk R26.2    COVID-19 U07.1    Dementia ([address] F03.90    Hypertension I10    Acquired hypothyroidism E03.9        Chief complaint :  Weakness  [de-identified] y.o. male who presents with increased confusion and profound weakness. Admitted for profound weakness and inability to ambulate. Profound weakness -  Associated with inability to ambulate   PT/OT       Severe dementia -  Continue aricept    HTN -  Continue home meds  Monitor BP    Hypothyroidism -  Continue with synthroid. Check TSH     COVID-19 infection -  No hypoxia: no oxygen or prednisone needed   Supportive care. DVT prophylaxis with lovenox    Disposition : TBD    Review of systems  General: No fevers or chills. Cardiovascular: No chest pain or pressure. No palpitations. Pulmonary: No shortness of breath. Gastrointestinal: No nausea, vomiting. Physical Exam:  General: Awake, cooperative, no acute distress    HEENT: NC, Atraumatic. PERRLA, anicteric sclerae. Lungs: CTA Bilaterally. No Wheezing/Rhonchi/Rales. Heart:  S1 S2,  No murmur, No Rubs, No Gallops  Abdomen: Soft, Non distended, Non tender. +Bowel sounds,   Extremities: No c/c/e  Psych:   Not anxious or agitated. Neurologic:  No acute neurological deficit. Vital signs/Intake and Output:  Visit Vitals  /85   Pulse 72   Temp 97.7 °F (36.5 °C)   Resp 18   Ht 5' 6\" (1.676 m)   Wt 84.7 kg (186 lb 11.2 oz)   SpO2 100%   BMI 30.13 kg/m²     Current Shift:  01/31 0701 - 01/31 1900  In: 120 [P.O.:120]  Out: 125 [Urine:125]  Last three shifts:  No intake/output data recorded.             Labs: Results:       Chemistry Recent Labs 01/31/23  0233 01/30/23  1640   GLU 81 91    143   K 3.8 4.4    108   CO2 27 32   BUN 10 11   CREA 0.67 0.83   CA 8.2* 8.7   AGAP 7 3   BUCR 15 13   AP  --  154*   TP  --  7.2   ALB  --  3.2*   GLOB  --  4.0   AGRAT  --  0.8      CBC w/Diff Recent Labs     01/31/23 0233 01/30/23  1640   WBC 2.6* 2.7*   RBC 4.47 4.90   HGB 13.5 14.7   HCT 42.1 46.3    139   GRANS  --  39*   LYMPH  --  40   EOS  --  3      Cardiac Enzymes No results for input(s): CPK, CKND1, AUREA in the last 72 hours. No lab exists for component: CKRMB, TROIP   Coagulation No results for input(s): PTP, INR, APTT, INREXT, INREXT in the last 72 hours. Lipid Panel Lab Results   Component Value Date/Time    Cholesterol, total 177 09/14/2011 04:55 AM    HDL Cholesterol 40 09/14/2011 04:55 AM    LDL, calculated 128.4 (H) 09/14/2011 04:55 AM    VLDL, calculated 8.6 09/14/2011 04:55 AM    Triglyceride 43 09/14/2011 04:55 AM    CHOL/HDL Ratio 4.4 09/14/2011 04:55 AM      BNP No results for input(s): BNPP in the last 72 hours.    Liver Enzymes Recent Labs     01/30/23  1640   TP 7.2   ALB 3.2*   *      Thyroid Studies Lab Results   Component Value Date/Time    TSH 3.80 (H) 02/20/2019 03:13 PM        Procedures/imaging: see electronic medical records for all procedures/Xrays and details which were not copied into this note but were reviewed prior to creation of Plan

## 2023-01-31 NOTE — PROGRESS NOTES
1238 PM Speech consult placed per verbal order from Dr. Sandra Belcher. Patient wife requested due to history of having difficulty swallowing.

## 2023-01-31 NOTE — PROGRESS NOTES
Reason for Admission:  profound weakness                     RUR Score:    Low; 12%                 Plan for utilizing home health:    Unlikely       PCP: First and Last name:  Ulysses Raymond, NP     Name of Practice:    Are you a current patient: Yes/No:    Approximate date of last visit:    Can you participate in a virtual visit with your PCP:                     Current Advanced Directive/Advance Care Plan: Full Code      Healthcare Decision Maker:   Click here to complete [address] including selection of the Healthcare Decision Maker Relationship (ie \"Primary\")             [facility] [phone]    Secondary Decision Maker: Saroj Elizondo - [phone]                  Transition of Care Plan:    SNF with possible transition to LTC                 Chart reviewed. Per H&P \"Isrrael Lehman is a [de-identified] y.o. male who presents with increased confusion. Pt is not a good historian due to dementia limited his cognition. He advised the ED team that his only complaint is that his head is big and it is swollen and twice as big as normal.  He denies headache, chest pain, shortness of breath. Per EMS they were called by family who stated he tested positive for COVID-19 on January 27, 3 days ago and has had increased confusion above baseline. Essentially family called 911 because patient is profoundly more weak than he normally is. At his baseline he is able to ambulate by walker. However now not only can he not ambulate but he also can even sit up in bed by himself without help. He lives along with his wife who is his primary caretaker. \"       CM spoke with pt and he would like CM to contact his wife to discuss care transition. CM contacted pt's wife, Mitchel Lyn ([phone]). CM spoke with pt's wife to discuss care transition. Prior to admission pt would use a RW to ambulate in the home and a wheel chair was used for outings.   Pt's wife has indicated the plan is for pt to transition to SNF with possible transition to LTC if unable to ambulate after rehab. CM offered [facility] and family would like [facility] [place] [place] [facility]. Anticipate pt will transition to SNF once an accepting facility has been identified.   CM to continue to follow and assist.    Care Management Interventions  Mode of Transport at Discharge: BLS  Transition of Care Consult (CM Consult): Discharge Planning, SNF  Health Maintenance Reviewed: Yes  Physical Therapy Consult: Yes  Occupational Therapy Consult: Yes  Speech Therapy Consult: Yes  Support Systems: Spouse/Significant Other  The Plan for Transition of Care is Related to the Following Treatment Goals : SNF with possible transition to LTC  The Patient and/or Patient Representative was Provided with a Choice of Provider and Agrees with the Discharge Plan?: Yes  Name of the Patient Representative Who was Provided with a Choice of Provider and Agrees with the Discharge Plan: spouse  Freedom of Choice List was Provided with Basic Dialogue that Supports the Patient's Individualized Plan of Care/Goals, Treatment Preferences and Shares the Quality Data Associated with the Providers?: Yes  Discharge Location  Patient Expects to be Discharged to[de-identified] Skilled nursing facility

## 2023-01-31 NOTE — ACP (ADVANCE CARE PLANNING)
Advance Care Planning     General Advance Care Planning (ACP) Conversation    Date of Conversation: 1/31/2023  Conducted with: Patient and Healthcare Decision Maker: Named in Advance Directive or Healthcare Power of Catherine Roger Lance Decision Maker:     Primary Decision Maker: Ebonie Cherry  - [phone]    Secondary Decision Maker: Eliecer Ly - Daughter - [phone]    Today we documented Decision Maker(s) consistent with ACP documents on file. Content/Action Overview: Has ACP document(s) on file - reflects the patient's care preferences  Reviewed DNR/DNI and family elects Full Code (Attempt Resuscitation)    Length of Voluntary ACP Conversation in minutes:  <16 minutes (Non-Billable)    1045 Seen today in room 311 along with Janes Gibbs NP. Sitting up in bed finishing breakfast. Awake, alert. Oriented x 3 but unable to give accurate accounting of how he came to be hospitalized. Respirations unlabored on room air Denies pain. Came to the ED 1/30/2023 from home per EMS for progressive weakness and increased confusion. Diagnosed with covid on 1/27 in [facility] ED and was sent home. In the ED he was so weak that he was not able to ambulate which is a change from his abilities at home where he has been able to walk with a walker    PMH significant for dementia, hypothyroidism, TIA    Admitted to the medical unit for profound weakness (Pt?OT, care management consultation), dementia (supportive care), covid (supportive care, oxygen if needed). Palliative Medicine consultation placed by Dr Toro Pittman for goals of care discussion and code status clarification    1130: Spoke with Mrs Natalia Stapleton on the phone. Introduced role of palliative care to the hospitalized patient. He lives in a single family home with his wife. Prior to admission, he was getting weaker in ADLs. Uses a walker (he stated he pushes a wheelchair for ambulation assistance.  His memory issues have been worse over the past week forgetting his children and grandchildren. Reviewed the AMD on file. Mrs Kristan Pride confirmed the information. Lengthy discussion about code status including DNR/DNI. She was clear that if he were on life support, he would not want to linger. She was not prepared to make a decision about CPR and shocks, wanting to discuss with her children. Will follow up during this admission. She understands the FULL CODE order stands. Disposition plan: to be determined based on response to treatment, awaiting results from therapy    Palliative care will continue to follow Jo Ann Toney  and his family during his hospitalization and support them as they make healthcare decisions and define goals of care.       Angie Bender RN, MSN  Palliative Medicine  P: [phone]

## 2023-02-01 LAB — TSH SERPL DL<=0.05 MIU/L-ACNC: 2.02 UIU/ML (ref 0.36–3.74)

## 2023-02-01 PROCEDURE — 97530 THERAPEUTIC ACTIVITIES: CPT

## 2023-02-01 PROCEDURE — 65270000029 HC RM PRIVATE

## 2023-02-01 PROCEDURE — 97166 OT EVAL MOD COMPLEX 45 MIN: CPT

## 2023-02-01 PROCEDURE — 36415 COLL VENOUS BLD VENIPUNCTURE: CPT

## 2023-02-01 PROCEDURE — 97110 THERAPEUTIC EXERCISES: CPT

## 2023-02-01 PROCEDURE — 92526 ORAL FUNCTION THERAPY: CPT

## 2023-02-01 PROCEDURE — 97535 SELF CARE MNGMENT TRAINING: CPT

## 2023-02-01 PROCEDURE — 74011000250 HC RX REV CODE- 250: Performed by: FAMILY MEDICINE

## 2023-02-01 PROCEDURE — 74011250636 HC RX REV CODE- 250/636: Performed by: FAMILY MEDICINE

## 2023-02-01 PROCEDURE — 74011250637 HC RX REV CODE- 250/637: Performed by: FAMILY MEDICINE

## 2023-02-01 PROCEDURE — 97162 PT EVAL MOD COMPLEX 30 MIN: CPT

## 2023-02-01 PROCEDURE — 74011250637 HC RX REV CODE- 250/637: Performed by: HOSPITALIST

## 2023-02-01 PROCEDURE — 84443 ASSAY THYROID STIM HORMONE: CPT

## 2023-02-01 RX ADMIN — DONEPEZIL HYDROCHLORIDE 10 MG: 5 TABLET, FILM COATED ORAL at 22:01

## 2023-02-01 RX ADMIN — TAMSULOSIN HYDROCHLORIDE 0.4 MG: 0.4 CAPSULE ORAL at 10:24

## 2023-02-01 RX ADMIN — ENOXAPARIN SODIUM 40 MG: 100 INJECTION SUBCUTANEOUS at 10:24

## 2023-02-01 RX ADMIN — LEVOTHYROXINE SODIUM 50 MCG: 0.05 TABLET ORAL at 10:24

## 2023-02-01 RX ADMIN — ATORVASTATIN CALCIUM 40 MG: 20 TABLET, FILM COATED ORAL at 22:01

## 2023-02-01 RX ADMIN — SODIUM CHLORIDE, PRESERVATIVE FREE 5 ML: 5 INJECTION INTRAVENOUS at 22:00

## 2023-02-01 RX ADMIN — SODIUM CHLORIDE, PRESERVATIVE FREE 10 ML: 5 INJECTION INTRAVENOUS at 05:05

## 2023-02-01 RX ADMIN — METOPROLOL SUCCINATE 25 MG: 25 TABLET, EXTENDED RELEASE ORAL at 10:24

## 2023-02-01 RX ADMIN — SODIUM CHLORIDE, PRESERVATIVE FREE 10 ML: 5 INJECTION INTRAVENOUS at 16:14

## 2023-02-01 RX ADMIN — ASPIRIN 325 MG ORAL TABLET 325 MG: 325 PILL ORAL at 10:24

## 2023-02-01 RX ADMIN — ACETAMINOPHEN 650 MG: 325 TABLET ORAL at 22:01

## 2023-02-01 NOTE — PROGRESS NOTES
Bedside report received on pt, no complaint voiced, call light within pt's reach. 1940:Pt assisted with telephone to his wife. Pt is alert and oriented, he denies any pain or SOB, call light with pt's reach.

## 2023-02-01 NOTE — PROGRESS NOTES
Problem: Dysphagia (Adult)  Description: Patient will:  1. Tolerate PO trials with 0 s/s overt distress in 4/5 trials. 2. Utilize compensatory swallow strategies/maneuvers (decrease bite/sip, size/rate, alt. liq/sol) with min cues in 4/5 trials. 3. Perform oral-motor/laryngeal exercises to increase oropharyngeal swallow function with min cues. 4. Complete an objective swallow study (i.e., MBSS) to assess swallow integrity, r/o aspiration, and determine of safest LRD, min A.    Rec:     Regular, thin liquid (NO STRAWS)  Pt may require assistance with meal set up  Aspiration precautions  HOB >45 during po intake, remain >30 for 30-45 minutes after po   Small bites/sips; alternate liquid/solid with slow feeding rate   Oral care TID  Meds one at a time  Outcome: Progressing Towards Goal    SPEECH LANGUAGE PATHOLOGY DYSPHAGIA TREATMENT    Patient: Manoj Tse (18 y.o. male)  Date: 2/1/2023  Diagnosis: Weakness [R53.1] Weakness      Precautions: Aspiration    PLOF:as per H&P     ASSESSMENT:  Patient seen by ST for dysphagia management. Patient with regular/thin breakfast tray at bedside. Patient had difficulty with food prep (cutting, opening containers) and feeding (getting food on fork). SLP provided assistance. Patient is A/Ox3. Baseline cough. Patient tolerated x 10 bites of solid textures and 5/6 sips of thin liquid. Patient demo cough x 1 following large sip of thin liquid. During subsequent sips, patient instructed to take small sips and hold bolus in mouth for 2-3 seconds before swallowing. Patient able to demo 3-second prep in 100% of opportunities with initial cue. Rec continue regular, thin liquid diet. No straws. Meds one at a time. General aspiration precautions. Patient needs assistance with meal prep/feeding. ST following.      Progression toward goals:  [x]         Improving appropriately and progressing toward goals  []         Improving slowly and progressing toward goals  []         Not making progress toward goals and plan of care will be adjusted     PLAN:  Recommendations and Planned Interventions:  See above  Patient continues to benefit from skilled intervention to address the above impairments. Continue treatment per established plan of care. Discharge Recommendations: To Be Determined     SUBJECTIVE:   Patient stated My coffee is cold. OBJECTIVE:   Cognitive and Communication Status:  Neurologic State: Alert  Orientation Level: Oriented to place, Oriented to person, Oriented to time  Cognition: Follows commands  Perception: Appears intact  Perseveration: No perseveration noted  Safety/Judgement: Awareness of environment  Dysphagia Treatment:  Oral Assessment:  Oral Assessment  Labial: No impairment  Dentition: Natural, Intact  Oral Hygiene:  (adequate)  Lingual: No impairment  Velum: No impairment  Mandible: No impairment  Gag Reflex:  (did not test)  P.O. Trials:   Patient Position:  (HOB>45)   Vocal quality prior to P.O.: Low volume   Consistency Presented: Thin liquid, Solid   How Presented: Self-fed/presented, SLP-fed/presented, Cup/sip, Straw, Spoon, Successive swallows       Bolus Acceptance: No impairment   Bolus Formation/Control: No impairment       Propulsion: No impairment   Oral Residue: Lingual, Less than 10% of bolus   Initiation of Swallow: No impairment   Laryngeal Elevation: Decreased   Aspiration Signs/Symptoms: Delayed cough/throat clear   Pharyngeal Phase Characteristics: Poor endurance, Multiple swallows, Suspected pharyngeal residue   Effective Modifications:  Alternate liquids/solids, Double swallow, Small sips and bites, Cup/sip   Cues for Modifications: Minimal-moderate         Oral Phase Severity: No impairment   Pharyngeal Phase Severity : Other (comment) (suspect)    PAIN:  Pain level pre-treatment: 0/10   Pain level post-treatment: 0/10     After treatment:   []              Patient left in no apparent distress sitting up in chair  [x]              Patient left in no apparent distress in bed  [x]              Call bell left within reach  [x]              Nursing notified  []              Family present  []              Caregiver present  []              Bed alarm activated      COMMUNICATION/EDUCATION:   [x] Aspiration precautions; swallow safety; compensatory techniques  [x]        Patient unable to participate in education; education ongoing with staff  []  Posted safety precautions in patient's room.   [] Oral-motor/laryngeal strengthening exercises    GABRIEL Carreon  Time Calculation: 20 mins

## 2023-02-01 NOTE — PROGRESS NOTES
Palliative Medicine    CODE STATUS: FULL CODE    AMD Status: on file naming his wife, Alverto Bradford and daughter, Connie Rooney, as MPOAs    Received telephone call from Mrs Garcia Monsivais this morning. She said she had good conversation with her children and hey are all in agreement to proceed with FULL CODE status. After meeting with patient and speaking with his wife, Alverto Bradford, the goals of care have been defined. Code status remains: FULL CODE    AMD status: on file naming his wife and daughter as MPOAs Will sign off but remain available for reconsult as needed/if appropriate.      Thank you for the Palliative Medicine consult and allowing us to participate in the care of Louie Portillo RN, MSN  Palliative Medicine   [phone]

## 2023-02-01 NOTE — PROGRESS NOTES
Hospitalist Progress Note    Patient: Saundra Falcon MRN: [id number]  CSN: [id number]    YOB: 1940  Age: [de-identified] y.o. Sex: male    DOA: 1/30/2023 LOS:  LOS: 2 days                Assessment/Plan     Patient Active Problem List   Diagnosis Code    DDD (degenerative disc disease), cervical M50.30    Weakness R53.1    Inability to walk R26.2    COVID-19 U07.1    Dementia ([facility] [address] F03.90    Hypertension I10    Acquired hypothyroidism E03.9        Chief complaint :  Weakness  [de-identified] y.o. male who presents with increased confusion and profound weakness. Admitted for profound weakness and inability to ambulate. Profound weakness -  Associated with inability to ambulate   PT/OT    Dysphagia -  Seen by SLP  Diet per SLP     Severe dementia -  Continue aricept    HTN -  Continue home meds  Monitor BP    Hypothyroidism -  Continue with synthroid. Check TSH     COVID-19 infection -  No hypoxia: no oxygen or prednisone needed   Supportive care. DVT prophylaxis with lovenox. Called wife and updated on hospitalization. Disposition : 1-2 days    Review of systems  General: No fevers or chills. Cardiovascular: No chest pain or pressure. No palpitations. Pulmonary: No shortness of breath. Gastrointestinal: No nausea, vomiting. Physical Exam:  General: Awake, cooperative, no acute distress    HEENT: NC, Atraumatic. PERRLA, anicteric sclerae. Lungs: CTA Bilaterally. No Wheezing/Rhonchi/Rales. Heart:  S1 S2,  No murmur, No Rubs, No Gallops  Abdomen: Soft, Non distended, Non tender. +Bowel sounds,   Extremities: No c/c/e  Psych:   Not anxious or agitated. Neurologic:  No acute neurological deficit. Vital signs/Intake and Output:  Visit Vitals  /89   Pulse 74   Temp 97.6 °F (36.4 °C)   Resp 18   Ht 5' 6\" (1.676 m)   Wt 83.5 kg (184 lb)   SpO2 94%   BMI 29.70 kg/m²     Current Shift:  No intake/output data recorded.   Last three shifts:  01/30 1901 - 02/01 0700  In: 360 [P.O.:360]  Out: [address]: Results:       Chemistry Recent Labs     01/31/23  0233 01/30/23  1640   GLU 81 91    143   K 3.8 4.4    108   CO2 27 32   BUN 10 11   CREA 0.67 0.83   CA 8.2* 8.7   AGAP 7 3   BUCR 15 13   AP  --  154*   TP  --  7.2   ALB  --  3.2*   GLOB  --  4.0   AGRAT  --  0.8      CBC w/Diff Recent Labs     01/31/23 0233 01/30/23  1640   WBC 2.6* 2.7*   RBC 4.47 4.90   HGB 13.5 14.7   HCT 42.1 46.3    139   GRANS  --  39*   LYMPH  --  40   EOS  --  3      Cardiac Enzymes No results for input(s): CPK, CKND1, AUREA in the last 72 hours. No lab exists for component: CKRMB, TROIP   Coagulation No results for input(s): PTP, INR, APTT, INREXT, INREXT in the last 72 hours. Lipid Panel Lab Results   Component Value Date/Time    Cholesterol, total 177 09/14/2011 04:55 AM    HDL Cholesterol 40 09/14/2011 04:55 AM    LDL, calculated 128.4 (H) 09/14/2011 04:55 AM    VLDL, calculated 8.6 09/14/2011 04:55 AM    Triglyceride 43 09/14/2011 04:55 AM    CHOL/HDL Ratio 4.4 09/14/2011 04:55 AM      BNP No results for input(s): BNPP in the last 72 hours.    Liver Enzymes Recent Labs     01/30/23  1640   TP 7.2   ALB 3.2*   *      Thyroid Studies Lab Results   Component Value Date/Time    TSH 2.02 02/01/2023 02:25 AM        Procedures/imaging: see electronic medical records for all procedures/Xrays and details which were not copied into this note but were reviewed prior to creation of Plan
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
## 2023-02-01 NOTE — ROUTINE PROCESS
Bedside shift change report given to MELINDA Rodriguez (oncoming nurse) by Lisbet Hummel RN (offgoing nurse). Report included the following information SBAR, Procedure Summary, Intake/Output, MAR, and Recent Results.

## 2023-02-01 NOTE — PROGRESS NOTES
Bedside and Verbal shift change report given to Renée Brewer (oncoming nurse) by Oscar Rasmussen (offgoing nurse). Report included the following information SBAR, Kardex, Intake/Output, MAR, and Recent Results.

## 2023-02-02 LAB
ANION GAP SERPL CALC-SCNC: 4 MMOL/L (ref 3–18)
BUN SERPL-MCNC: 13 MG/DL (ref 7–18)
BUN/CREAT SERPL: 13 (ref 12–20)
CALCIUM SERPL-MCNC: 8.7 MG/DL (ref 8.5–10.1)
CHLORIDE SERPL-SCNC: 111 MMOL/L (ref 100–111)
CO2 SERPL-SCNC: 28 MMOL/L (ref 21–32)
CREAT SERPL-MCNC: 1.02 MG/DL (ref 0.6–1.3)
ERYTHROCYTE [DISTWIDTH] IN BLOOD BY AUTOMATED COUNT: 13.6 % (ref 11.6–14.5)
GLUCOSE SERPL-MCNC: 106 MG/DL (ref 74–99)
HCT VFR BLD AUTO: 45.5 % (ref 36–48)
HGB BLD-MCNC: 14.3 G/DL (ref 13–16)
MCH RBC QN AUTO: 29.6 PG (ref 24–34)
MCHC RBC AUTO-ENTMCNC: 31.4 G/DL (ref 31–37)
MCV RBC AUTO: 94.2 FL (ref 78–100)
NRBC # BLD: 0 K/UL (ref 0–0.01)
NRBC BLD-RTO: 0 PER 100 WBC
PLATELET # BLD AUTO: 164 K/UL (ref 135–420)
PMV BLD AUTO: 9.4 FL (ref 9.2–11.8)
POTASSIUM SERPL-SCNC: 4.1 MMOL/L (ref 3.5–5.5)
RBC # BLD AUTO: 4.83 M/UL (ref 4.35–5.65)
SODIUM SERPL-SCNC: 143 MMOL/L (ref 136–145)
WBC # BLD AUTO: 4.2 K/UL (ref 4.6–13.2)

## 2023-02-02 PROCEDURE — 36415 COLL VENOUS BLD VENIPUNCTURE: CPT

## 2023-02-02 PROCEDURE — 74011000250 HC RX REV CODE- 250: Performed by: FAMILY MEDICINE

## 2023-02-02 PROCEDURE — 74011250637 HC RX REV CODE- 250/637: Performed by: HOSPITALIST

## 2023-02-02 PROCEDURE — 92526 ORAL FUNCTION THERAPY: CPT

## 2023-02-02 PROCEDURE — 74011250636 HC RX REV CODE- 250/636: Performed by: FAMILY MEDICINE

## 2023-02-02 PROCEDURE — 97530 THERAPEUTIC ACTIVITIES: CPT

## 2023-02-02 PROCEDURE — 97116 GAIT TRAINING THERAPY: CPT

## 2023-02-02 PROCEDURE — 85027 COMPLETE CBC AUTOMATED: CPT

## 2023-02-02 PROCEDURE — 97535 SELF CARE MNGMENT TRAINING: CPT

## 2023-02-02 PROCEDURE — 80048 BASIC METABOLIC PNL TOTAL CA: CPT

## 2023-02-02 PROCEDURE — 65270000029 HC RM PRIVATE

## 2023-02-02 RX ADMIN — SODIUM CHLORIDE, PRESERVATIVE FREE 10 ML: 5 INJECTION INTRAVENOUS at 06:41

## 2023-02-02 RX ADMIN — SODIUM CHLORIDE, PRESERVATIVE FREE 10 ML: 5 INJECTION INTRAVENOUS at 14:00

## 2023-02-02 RX ADMIN — DONEPEZIL HYDROCHLORIDE 10 MG: 5 TABLET, FILM COATED ORAL at 22:49

## 2023-02-02 RX ADMIN — TAMSULOSIN HYDROCHLORIDE 0.4 MG: 0.4 CAPSULE ORAL at 09:57

## 2023-02-02 RX ADMIN — ATORVASTATIN CALCIUM 40 MG: 20 TABLET, FILM COATED ORAL at 22:49

## 2023-02-02 RX ADMIN — LEVOTHYROXINE SODIUM 50 MCG: 0.05 TABLET ORAL at 06:41

## 2023-02-02 RX ADMIN — ENOXAPARIN SODIUM 40 MG: 100 INJECTION SUBCUTANEOUS at 09:57

## 2023-02-02 RX ADMIN — ASPIRIN 325 MG ORAL TABLET 325 MG: 325 PILL ORAL at 09:57

## 2023-02-02 RX ADMIN — SODIUM CHLORIDE, PRESERVATIVE FREE 10 ML: 5 INJECTION INTRAVENOUS at 22:50

## 2023-02-02 RX ADMIN — METOPROLOL SUCCINATE 25 MG: 25 TABLET, EXTENDED RELEASE ORAL at 09:57
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
## 2023-02-02 NOTE — PROGRESS NOTES
Problem: Mobility Impaired (Adult and Pediatric)  Goal: *Acute Goals and Plan of Care (Insert Text)  Description: Physical Therapy Goals   Initiated 2/1/2023 and to be accomplished within 7 day(s)  1. Patient will move from supine <> sit with S in prep for out of bed activity and change of position. 2.  Patient will perform sit<> stand with S/RW in prep for transfers/ambulation. 3.  Patient will transfer from bed <> chair with S/RW for time up in chair for completion of ADL activity. 4.  Patient will ambulate 80 feet with S/RW for improved functional mobility at discharge. 5.  Patient will ascend/descend 3-5 stairs with handrail(s) with minimal assistance/contact guard assist for home re-entry as needed. Outcome: Progressing Towards Goal    PHYSICAL THERAPY TREATMENT    Patient: Binu Albarran (75 y.o. male)  Date: 2/2/2023  Diagnosis: Weakness [R53.1] Weakness  Precautions: Fall, Contact (DROPLET+)    ASSESSMENT:  Pt seen in full PPE. Pt found supine in bed and willing to participate with PT session. Pt seen with OT for second set of skilled hands. O2 sat on RA 99%. Pt CGA/min a supine > sit. STS with RW/min/CGA with bed raised. Required 2 trials for STS today. Gt completed 10ft with RW/min/mod A of 2 and noted R foot drop, decr'd step length, slow thomas and tendency for toe walking R foot. Verbal cues for safety and sequencing. Returned to bed supine with CGA and exited room, however, pt call light on while this writer still gowned. Pt requesting to use the bathroom for BM. BSC brought to bedside and pt required mod A of 1 bed to [facility] with RW/GB applied. Pt sat several minutes, before completion. OT returned to assist with hygiene care (total assist) and return to bed mod A/RW. Brief donned once pt supine. Pt left with all needs in reach and in NAD. Nurse Autumn Yao notified of above. Highly rec SNF at discharge to continue PT prior to pt return home.   Progression toward goals:   [] Improving appropriately and progressing toward goals  [x]      Improving slowly and progressing toward goals  []      Not making progress toward goals and plan of care will be adjusted     PLAN:  Patient continues to benefit from skilled intervention to address the above impairments. Continue treatment per established plan of care. Discharge Recommendations: Aron Trejo  Further Equipment Recommendations for Discharge:  rolling walker    AMPAC: 12    This AMPAC score should be considered in conjunction with interdisciplinary team recommendations to determine the most appropriate discharge setting. Patient's social support, diagnosis, medical stability, and prior level of function should also be taken into consideration. SUBJECTIVE:   Patient stated Dalene Cogan.     OBJECTIVE DATA SUMMARY:   Critical Behavior:  Neurologic State: Alert, Confused  Orientation Level: Oriented to person, Oriented to place, Oriented to time  Cognition: Follows commands  Safety/Judgement: Decreased insight into deficits  Functional Mobility Training:  Bed Mobility:  Supine to Sit: Contact guard assistance;Minimum assistance  Sit to Supine: Contact guard assistance  Scooting: Minimum assistance  Transfers:  Sit to Stand: Minimum assistance;Contact guard assistance  Stand to Sit: Minimum assistance;Contact guard assistance  Balance:  Sitting: Intact  Standing: Impaired; With support  Standing - Static: Fair  Standing - Dynamic : Fair (fair (-))   Ambulation/Gait Training:  Distance (ft): 10 Feet (ft)  Assistive Device: Gait belt;Walker, rolling  Ambulation - Level of Assistance: Minimal assistance; Moderate assistance;Assist x2  Gait Description (WDL): Exceptions to WDL  Gait Abnormalities: Decreased step clearance; Foot drop; Step to gait; Shuffling gait  Speed/Adela: Slow;Shuffled  Interventions: Safety awareness training; Tactile cues; Verbal cues; Visual/Demos  Pain:  Pain level pre-treatment: 0/10  Pain level post-treatment: 0/10   Pain Intervention(s): Medication (see MAR); Rest, Ice, Repositioning   Response to intervention: Nurse notified, See doc flow  Activity Tolerance:   Fair   Please refer to the flowsheet for vital signs taken during this treatment. After treatment:   [] Patient left in no apparent distress sitting up in chair  [x] Patient left in no apparent distress in bed  [x] Call bell left within reach  [x] Nursing notified  [] Caregiver present  [x] Bed alarm activated  [] SCDs applied      COMMUNICATION/EDUCATION:   [x]         Role of Physical Therapy in the acute care setting. [x]         Fall prevention education was provided and the patient/caregiver indicated understanding. [x]         Patient/family have participated as able in working toward goals and plan of care. [x]         Patient/family agree to work toward stated goals and plan of care. []         Patient understands intent and goals of therapy, but is neutral about his/her participation. []         Patient is unable to participate in stated goals/plan of care: ongoing with therapy staff.  []         Other:        Ortiz Peng, PT   Time Calculation: 58 mins    Geoff Gibson AM-PAC® Basic Mobility Inpatient Short Form (6-Clicks) Version 2    How much HELP from another person does the patient currently need    (If the patient hasn't done an activity recently, how much help from another person do you think he/she would need if he/she tried?)   Total (Total A or Dep)   A Lot  (Mod to Max A)   A Little (Sup or Min A)   None (Mod I to I)   Turning from your back to your side while in a flat bed without using bedrails? [] 1 [] 2 [x] 3 [] 4   2. Moving from lying on your back to sitting on the side of a flat bed without using bedrails? [] 1 [] 2 [x] 3 [] 4   3. Moving to and from a bed to a chair (including a wheelchair)? [] 1 [x] 2 [] 3 [] 4   4. Standing up from a chair using your arms (e.g., wheelchair, or bedside chair)?    [] 1 [x] 2 [] 3 [] 4   5. Walking in hospital room? [] 1 [x] 2 [] 3 [] 4   6. Climbing 3-5 steps with a railing?+   [] 1 [] 2 [] 3 [] 4   +If stair climbing cannot be assessed, skip item #6. Sum responses from items 1-5. Based on an AM-PAC score of /24 (12/20 if omitting stairs) and their current functional mobility deficits, it is recommended that the patient have 3-5 sessions per week of Physical Therapy at d/c to increase the patient's independence.

## 2023-02-02 NOTE — PROGRESS NOTES
Hospitalist Progress Note    Patient: Whit Robbins MRN: [id number]  CSN: [id number]    YOB: 1940  Age: [de-identified] y.o. Sex: male    DOA: 1/30/2023 LOS:  LOS: 3 days                Assessment/Plan     Patient Active Problem List   Diagnosis Code    DDD (degenerative disc disease), cervical M50.30    Weakness R53.1    Inability to walk R26.2    COVID-19 U07.1    Dementia ([facility] [address] F03.90    Hypertension I10    Acquired hypothyroidism E03.9        Chief complaint :  Weakness  [de-identified] y.o. male who presents with increased confusion and profound weakness. Admitted for profound weakness and inability to ambulate. Profound weakness -  Associated with inability to ambulate   PT/OT    Dysphagia -  Seen by SLP  Diet per SLP     Severe dementia -  Continue aricept    HTN -  Continue home meds  Monitor BP    Hypothyroidism -  Continue with synthroid. Check TSH     COVID-19 infection -  No hypoxia: no oxygen or prednisone needed   Supportive care. DVT prophylaxis with lovenox. Disposition : await placement. Review of systems  General: No fevers or chills. Cardiovascular: No chest pain or pressure. No palpitations. Pulmonary: No shortness of breath. Gastrointestinal: No nausea, vomiting. Physical Exam:  General: Awake, cooperative, no acute distress    HEENT: NC, Atraumatic. PERRLA, anicteric sclerae. Lungs: CTA Bilaterally. No Wheezing/Rhonchi/Rales. Heart:  S1 S2,  No murmur, No Rubs, No Gallops  Abdomen: Soft, Non distended, Non tender. +Bowel sounds,   Extremities: No c/c/e  Psych:   Not anxious or agitated. Neurologic:  No acute neurological deficit. Vital signs/Intake and Output:  Visit Vitals  /60 (BP 1 Location: Left upper arm, BP Patient Position: At rest)   Pulse 74   Temp 98.6 °F (37 °C)   Resp 18   Ht 5' 6\" (1.676 m)   Wt 83.5 kg (184 lb)   SpO2 94%   BMI 29.70 kg/m²     Current Shift:  No intake/output data recorded.   Last three shifts:  01/31 1901 - 02/02 0700  In: 240 [P.O.:240]  Out: -             Labs: Results:       Chemistry Recent Labs     02/02/23  0410 01/31/23  0233   * 81    145   K 4.1 3.8    111   CO2 28 27   BUN 13 10   CREA 1.02 0.67   CA 8.7 8.2*   AGAP 4 7   BUCR 13 15      CBC w/Diff Recent Labs     02/02/23  0410 01/31/23  0233   WBC 4.2* 2.6*   RBC 4.83 4.47   HGB 14.3 13.5   HCT 45.5 42.1    136      Cardiac Enzymes No results for input(s): CPK, CKND1, AUREA in the last 72 hours. No lab exists for component: CKRMB, TROIP   Coagulation No results for input(s): PTP, INR, APTT, INREXT, INREXT in the last 72 hours. Lipid Panel Lab Results   Component Value Date/Time    Cholesterol, total 177 09/14/2011 04:55 AM    HDL Cholesterol 40 09/14/2011 04:55 AM    LDL, calculated 128.4 (H) 09/14/2011 04:55 AM    VLDL, calculated 8.6 09/14/2011 04:55 AM    Triglyceride 43 09/14/2011 04:55 AM    CHOL/HDL Ratio 4.4 09/14/2011 04:55 AM      BNP No results for input(s): BNPP in the last 72 hours. Liver Enzymes No results for input(s): TP, ALB, TBIL, AP in the last 72 hours.     No lab exists for component: SGOT, GPT, DBIL     Thyroid Studies Lab Results   Component Value Date/Time    TSH 2.02 02/01/2023 02:25 AM        Procedures/imaging: see electronic medical records for all procedures/Xrays and details which were not copied into this note but were reviewed prior to creation of Plan

## 2023-02-02 NOTE — PROGRESS NOTES
Pt received from Day RN. No complaints. IV patent. 65- RN responded to tele disconnection and noted the pt in the dark in chair, removing gown despite side rails x4. Pt noted to be dry, no pain, no injuries per pt, Pt unable to explain why he got up but cooperated with RN to return to bed. Pt previously noted to be admitted due to decline in mobility. Pt returned to bed with assistance of RN and walker with minimal difficulty. Pt given call light and reminded to call if there are any needs. Pt states he should be fine now. Bed alarm in place, lights dimmed, socks on and bed lowered. Unable to keep room open due to precautions    0615 Pt attempted to ambulate by himself. No complaint of pain, assisted back into bed by RN and Dorothy Leroy RN. Pt stated that he needed to use the bathroom and was changed. Bed alarm returned on.
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
## 2023-02-02 NOTE — PROGRESS NOTES
Problem: Self Care Deficits Care Plan (Adult)  Goal: *Acute Goals and Plan of Care (Insert Text)  Description: Occupational Therapy Goals  Initiated 2023 within 7 day(s). 1.  Patient will perform grooming with minimal assistance/contact guard assist standing at sink for 5 minutes or more. 2.  Patient will perform upper body dressing with supervision/set-up. 3.  Patient will perform lower body dressing with minimal assistance/contact guard assist.  4.  Patient will perform toilet transfers with minimal assistance/contact guard assist.  5.  Patient will perform all aspects of toileting with minimal assistance/contact guard assist.  6.  Patient will participate in upper extremity therapeutic exercise/activities with supervision/set-up for 10 minutes. 7.  Patient will utilize energy conservation techniques during functional activities with verbal, visual, and tactile cues. Outcome: Progressing Towards Goal    OCCUPATIONAL THERAPY TREATMENT    Patient: Maureen Cruz (54 y.o. male)  Date: 2023  Diagnosis: Weakness [R53.1] Weakness      Precautions: Fall, Contact (DROPLET+)  PLOF: ***    Chart, occupational therapy assessment, plan of care, and goals were reviewed. ASSESSMENT:  ***  Progression toward goals:  []          Improving appropriately and progressing toward goals  []          Improving slowly and progressing toward goals  []          Not making progress toward goals and plan of care will be adjusted     PLAN:  Patient continues to benefit from skilled intervention to address the above impairments. Continue treatment per established plan of care. Discharge Recommendations: {AFTER [facility]}  Further Equipment Recommendations for Discharge:  { :20792::N/A}    AMPAC: ***    This AMPAC score should be considered in conjunction with interdisciplinary team recommendations to determine the most appropriate discharge setting.  Patient's social support, diagnosis, medical stability, and prior level of function should also be taken into consideration. SUBJECTIVE:   Patient stated ***.    OBJECTIVE DATA SUMMARY:   Cognitive/Behavioral Status:  Neurologic State: Alert, Confused  Orientation Level: Oriented to person, Oriented to place  Cognition: Follows commands  Safety/Judgement: Fall prevention, Decreased insight into deficits    Functional Mobility and Transfers for ADLs:   Bed Mobility:     Supine to Sit: Minimum assistance;Contact guard assistance  Sit to Supine: Minimum assistance;Contact guard assistance      Transfers:  Sit to Stand: Minimum assistance;Contact guard assistance  Stand to Sit: Contact guard assistance;Minimum assistance                Toilet Transfer : Minimum assistance (bed<>bsc)                    Balance:  Sitting: Intact  Standing: Impaired; With support  Standing - Static: Fair  Standing - Dynamic : Fair (-)    ADL Intervention:                           Lower Body Dressing Assistance  Dressing Assistance: Moderate assistance  Protective Undergarmet: Moderate assistance  Socks: Moderate assistance  Leg Crossed Method Used: No  Position Performed: Seated edge of bed    Toileting  Toileting Assistance: Moderate assistance  Bladder Hygiene: Moderate assistance  Bowel Hygiene: Moderate assistance  Clothing Management: Moderate assistance    Cognitive Retraining  Safety/Judgement: Fall prevention;Decreased insight into deficits    Neuro Re-Education:           ***    UE Therapeutic Exercises:   ***    Pain:  Pain level pre-treatment: ***/10   Pain level post-treatment: ***/10  Pain Intervention(s): Medication (see MAR); Rest, Ice, Repositioning***   Response to intervention: Nurse notified, See doc flow***    Activity Tolerance:    ***  Please refer to the flowsheet for vital signs taken during this treatment.   After treatment:   []  Patient left in no apparent distress sitting up in chair  []  Patient left in no apparent distress in bed  []  Call bell left within reach  []  Nursing notified  []  Caregiver present  []  Bed alarm activated    COMMUNICATION/EDUCATION:   [] Role of Occupational Therapy in the acute care setting  [] Home safety education was provided and the patient/caregiver indicated understanding. [] Patient/family have participated as able in working towards goals and plan of care. [] Patient/family agree to work toward stated goals and plan of care. [] Patient understands intent and goals of therapy, but is neutral about his/her participation. [] Patient is unable to participate in goal setting and plan of care. Thank you for this referral.  Manuela Razo OT  Time Calculation: 50 mins    [employer] AM-PAC® Daily Activity Inpatient Short Form (6-Clicks)*    How much HELP from another person does the patient currently need    (If the patient hasn't done an activity recently, how much help from another person do you think he/she would need if he/she tried?)   Total (Total A or Dep)   A Lot  (Mod to Max A)   A Little (Sup or Min A)   None (Mod I to I)   Putting on and taking off regular lower body clothing? [] 1 [] 2 [] 3 [] 4   2. Bathing (including washing, rinsing,      drying)? [] 1 [] 2 [] 3 [] 4   3. Toileting, which includes using toilet, bedpan or urinal?   [] 1 [] 2 [] 3 [] 4   4. Putting on and taking off regular upper body clothing? [] 1 [] 2 [] 3 [] 4   5. Taking care of personal grooming such as brushing teeth? [] 1 [] 2 [] 3 [] 4   6. Eating meals? [] 1 [] 2 [] 3 [] 4      Based on an AM-PAC score of **/24 and their current ADL deficits; it is recommended that the patient have 5-7 sessions per week of Occupational Therapy at d/c to increase the patient's independence. Currently, this patient demonstrates the potential endurance, and/or tolerance for 3 hours of therapy each day at d/c.       Based on an AM-PAC score of **/24 and their current ADL deficits; it is recommended that the patient have 3-5 sessions per week of Occupational Therapy at d/c to increase the patient's independence. Based on an AM-PAC score of **/24 and their current ADL deficits; it is recommended that the patient have 2-3 sessions per week of Occupational Therapy at d/c to increase the patient's independence. At this time and based on an AM-PAC score of **/24, no further OT is recommended upon discharge due to (i.e. patient at baseline functional statusetc). Recommend patient returns to prior setting with prior services.

## 2023-02-02 NOTE — PROGRESS NOTES
Problem: Dysphagia (Adult)  Description: Patient will:  1. Tolerate PO trials with 0 s/s overt distress in 4/5 trials. 2. Utilize compensatory swallow strategies/maneuvers (decrease bite/sip, size/rate, alt. liq/sol) with min cues in 4/5 trials. 3. Perform oral-motor/laryngeal exercises to increase oropharyngeal swallow function with min cues. 4. Complete an objective swallow study (i.e., MBSS) to assess swallow integrity, r/o aspiration, and determine of safest LRD, min A.    Rec:     Regular, thin liquid (NO STRAWS)  Pt needs assistance with meal setup (cutting food)  Aspiration precautions  HOB >45 during po intake, remain >30 for 30-45 minutes after po   Small bites/sips; alternate liquid/solid with slow feeding rate   Oral care TID  Meds one at a time  Outcome: Progressing Towards Goal    SPEECH LANGUAGE PATHOLOGY DYSPHAGIA TREATMENT    Patient: Makenzie Saab (29 y.o. male)  Date: 2/2/2023  Diagnosis: Weakness [R53.1] Weakness      Precautions: Aspiration, Fall, Contact (DROPLET+)  PLOF:as per H&P     ASSESSMENT:  Patient seen by ST for dysphagia management. A/Ox3. Untouched breakfast tray at bedside. Patient reported that he was unable to cut his pancakes. SLP reminded patient able call button. Patient encouraged to eat but patient stated, \"I don't want nothing. \"  Patient was upset because he had difficulty using the phone to call wife. Patient stated \"I want to go home but I'm stuck in bed. \"  SLP assisted patient in making phone call and patient spoke to his wife. When patient was handed food items (or fork presented to mouth), patient agreeable to eating. Very limited intake this day. Patient instructed to hold bolus in mouth for 3 seconds prior to swallow initiation (with thins). Patient complied but required verbal cue before each sip. Oral prep appears WFL; swallow initiation timely. No overt s/sx of aspiration or distress visualized today. Continue current diet/POC.  Patient requires assistance with meal setup  (cutting his food). D/w RN. Progression toward goals:  [x]         Improving appropriately and progressing toward goals  []         Improving slowly and progressing toward goals  []         Not making progress toward goals and plan of care will be adjusted     PLAN:  Recommendations and Planned Interventions:  See above  Patient continues to benefit from skilled intervention to address the above impairments. Continue treatment per established plan of care. Discharge Recommendations: To Be Determined     SUBJECTIVE:   Patient stated that's enough. OBJECTIVE:   Cognitive and Communication Status:  Neurologic State: Alert, Confused  Orientation Level: Oriented to person, Oriented to place, Oriented to time  Cognition: Follows commands  Perception: Appears intact  Perseveration: No perseveration noted  Safety/Judgement: Decreased insight into deficits  Dysphagia Treatment:  Oral Assessment:  Oral Assessment  Labial: No impairment  Dentition: Natural, Intact  Oral Hygiene:  (adequate)  Lingual: No impairment  Velum: No impairment  Mandible: No impairment  Gag Reflex:  (did not test)  P.O. Trials:   Patient Position:  (HOB>45)   Vocal quality prior to P.O.: No impairment   Consistency Presented: Thin liquid, Solid, Puree, Mixed consistency   How Presented: SLP-fed/presented, Cup/sip, Spoon       Bolus Acceptance: No impairment   Bolus Formation/Control: No impairment       Propulsion: No impairment   Oral Residue: Lingual, Less than 10% of bolus   Initiation of Swallow: No impairment   Laryngeal Elevation: Decreased   Aspiration Signs/Symptoms: None   Pharyngeal Phase Characteristics: Easily fatigued    Effective Modifications:  Alternate liquids/solids, Double swallow, Small sips and bites, Cup/sip   Cues for Modifications: Minimal-moderate         Oral Phase Severity: No impairment   Pharyngeal Phase Severity : Other (comment) (suspect)    PAIN:  Pain level pre-treatment: 0/10   Pain level post-treatment: 0/10     After treatment:   []              Patient left in no apparent distress sitting up in chair  [x]              Patient left in no apparent distress in bed  [x]              Call bell left within reach  [x]              Nursing notified  []              Family present  []              Caregiver present  []              Bed alarm activated      COMMUNICATION/EDUCATION:   [x] Aspiration precautions; swallow safety; compensatory techniques  []        Patient unable to participate in education; education ongoing with staff  []  Posted safety precautions in patient's room.   [] Oral-motor/laryngeal strengthening exercises    Ed Natali, SLP  Time Calculation: 15 mins

## 2023-02-02 NOTE — PROGRESS NOTES
D/C Plan: SNF to possible LTC    CM has been notified [facility] do not have availability at this time. CM contacted pt's wife, Juan Carlos Mejia ([phone]),  and provided an update. CM emailed a list of area SNF for family to review. Anticipate pt will transition to SNF once an accepting facility has been identified.   CM to continue to follow and assist.    Care Management Interventions  Mode of Transport at Discharge: BLS  Transition of Care Consult (CM Consult): Discharge Planning, SNF  Health Maintenance Reviewed: Yes  Physical Therapy Consult: Yes  Occupational Therapy Consult: Yes  Speech Therapy Consult: Yes  Support Systems: Spouse/Significant Other  The Plan for Transition of Care is Related to the Following Treatment Goals : SNF with possible transition to LTC  The Patient and/or Patient Representative was Provided with a Choice of Provider and Agrees with the Discharge Plan?: Yes  Name of the Patient Representative Who was Provided with a Choice of Provider and Agrees with the Discharge Plan: spouse  Freedom of Choice List was Provided with Basic Dialogue that Supports the Patient's Individualized Plan of Care/Goals, Treatment Preferences and Shares the Quality Data Associated with the Providers?: Yes  Discharge Location  Patient Expects to be Discharged to[de-identified] Skilled nursing facility

## 2023-02-03 VITALS
RESPIRATION RATE: 18 BRPM | TEMPERATURE: 98.2 F | OXYGEN SATURATION: 92 % | DIASTOLIC BLOOD PRESSURE: 64 MMHG | WEIGHT: 184 LBS | BODY MASS INDEX: 29.57 KG/M2 | HEART RATE: 93 BPM | HEIGHT: 66 IN | SYSTOLIC BLOOD PRESSURE: 112 MMHG

## 2023-02-03 PROBLEM — R13.10 DYSPHAGIA: Status: ACTIVE | Noted: 2023-02-03

## 2023-02-03 PROCEDURE — 74011000250 HC RX REV CODE- 250: Performed by: FAMILY MEDICINE

## 2023-02-03 PROCEDURE — 74011250636 HC RX REV CODE- 250/636: Performed by: FAMILY MEDICINE

## 2023-02-03 PROCEDURE — 2709999900 HC NON-CHARGEABLE SUPPLY

## 2023-02-03 PROCEDURE — 74011250637 HC RX REV CODE- 250/637: Performed by: HOSPITALIST

## 2023-02-03 RX ADMIN — ENOXAPARIN SODIUM 40 MG: 100 INJECTION SUBCUTANEOUS at 08:09

## 2023-02-03 RX ADMIN — METOPROLOL SUCCINATE 25 MG: 25 TABLET, EXTENDED RELEASE ORAL at 08:09

## 2023-02-03 RX ADMIN — SODIUM CHLORIDE, PRESERVATIVE FREE 10 ML: 5 INJECTION INTRAVENOUS at 06:58

## 2023-02-03 RX ADMIN — TAMSULOSIN HYDROCHLORIDE 0.4 MG: 0.4 CAPSULE ORAL at 08:09

## 2023-02-03 RX ADMIN — LEVOTHYROXINE SODIUM 50 MCG: 0.05 TABLET ORAL at 06:58

## 2023-02-03 RX ADMIN — ASPIRIN 325 MG ORAL TABLET 325 MG: 325 PILL ORAL at 08:09

## 2023-02-03 NOTE — PROGRESS NOTES
D/C Plan: [facility]    Pts wife would like to have additional clinical sent to [facility] and [facility]. CMS has been notified to assist.  CM to continue to follow and assist.    0930:  Pt has been accepted to [facility] for admission today. No beds at the other facilities requested. CM contacted pt's wife, Susie Vazquez ([phone]),  and provided an update. Family is in agreement with pt transitioning to [facility] today. CM will request a 2:30pm/3:00pm transport today to this facility. CM to contine to follow and and assist.    Transition of care to SNF: [facility]     Communication to Patient/Family:  Met with patient and family, and they are agreeable to the transition plan. The Plan for Transition of Care is related to the following treatment goals: SNF    The Patient and/or patient representative  was provided with a choice of provider and agrees   with the discharge plan. Yes [x] No []    Freedom of choice list was provided with basic dialogue that supports the patient's individualized plan of care/goals and shares the quality data associated with the providers. Yes [x] No []    SNF/Rehab Transition:  Patient has been accepted to [facility], [address] [place] [address], and meets criteria for admission. Patient will transported by medical transport and expected to leave this afternoon at 2:30pm.    Communication to SNF/Rehab:  Bedside RN has been notified to update the transition plan to the facility and call report [phone]    Discharge information has been updated on the AVS and sent via [facility] and/or CC link. Discharge instructions to be fax'd to facility at [phone]     Please include all hard scripts for controlled substances, med rec and dc summary, and AVS in packet.  Please medicate for pain prior to dc if possible and needed to help offset delay when patient first arrives to facility. Reviewed and confirmed with facility, [facility] can manage the patient care needs for the following:     Berenice Melton with (X) only those applicable:  Medication:  []Medications are available at the facility  []IV Antibiotics    []Controlled Substance - hard copies available sent. []Weekly Labs    Equipment:  []CPAP/BiPAP  []Wound Vacuum  []Root or Urinary Device  []PICC/Central Line  []Nebulizer  []Ventilator    Treatment:  []Isolation (for MRSA, VRE, etc.)  []Surgical Drain Management  []Tracheostomy Care  []Dressing Changes  []Dialysis with transportation  []PEG Care  []Oxygen  []Daily Weights for Heart Failure    Dietary:  []Any diet limitations  []Tube Feedings   []Total Parenteral Management (TPN)    Financial Resources:  []Medicaid Application Completed    []UAI Completed and copy given to pt/family    []A screening has previously been completed. []Level II Completed    [x] Private pay individual who will not become financially eligible for Medicaid within 6 months from admission to a [address] [place] [address]. [] Individual refused to have screening conducted. []Medicaid Application Completed    []The screening denied because it was determined individual did not need/did not qualify for nursing facility level of care. [] Out of state residents seeking direct admission to a [address] facility. [] Individuals who are inpatients of an out of state hospital, or in state or out of state veterans/ hospital and seek direct admission to a [address] facility  [] Individuals who are pateints or residents of a state owned/operated facility that is licensed by Department of [employer] ([employer]) and seek direct admission to [address] [place] [address]  [] A screening not required for enrollment in [facility] as set out in [facility]-  [] [facility]) staff shall perform screenings of the [facility] clients.       Advanced Care Plan:  []Surrogate Decision Maker of Care  []POA  []Communicated Code Status and copy sent.     Other:         Care Management Interventions  Mode of Transport at Discharge: BLS  Transition of Care Consult (CM Consult): Discharge Planning, SNF  Health Maintenance Reviewed: Yes  Physical Therapy Consult: Yes  Occupational Therapy Consult: Yes  Speech Therapy Consult: Yes  Support Systems: Spouse/Significant Other  The Plan for Transition of Care is Related to the Following Treatment Goals : SNF with possible transition to LTC  The Patient and/or Patient Representative was Provided with a Choice of Provider and Agrees with the Discharge Plan?: Yes  Name of the Patient Representative Who was Provided with a Choice of Provider and Agrees with the Discharge Plan: spouse  Freedom of Choice List was Provided with Basic Dialogue that Supports the Patient's Individualized Plan of Care/Goals, Treatment Preferences and Shares the Quality Data Associated with the Providers?: Yes  Discharge Location  Patient Expects to be Discharged to[de-identified] Skilled nursing facility

## 2023-02-03 NOTE — PROGRESS NOTES
Problem: Risk for Spread of Infection  Goal: Prevent transmission of infectious organism to others  Description: Prevent the transmission of infectious organisms to other patients, staff members, and visitors. Outcome: Progressing Towards Goal     Problem: Patient Education:  Go to Education Activity  Goal: Patient/Family Education  Outcome: Progressing Towards Goal     Problem: Airway Clearance - Ineffective  Goal: Achieve or maintain patent airway  Outcome: Progressing Towards Goal     Problem: Gas Exchange - Impaired  Goal: Absence of hypoxia  Outcome: Progressing Towards Goal  Goal: Promote optimal lung function  Outcome: Progressing Towards Goal     Problem: Breathing Pattern - Ineffective  Goal: Ability to achieve and maintain a regular respiratory rate  Outcome: Progressing Towards Goal     Problem:  Body Temperature -  Risk of, Imbalanced  Goal: Ability to maintain a body temperature within defined limits  Outcome: Progressing Towards Goal  Goal: Will regain or maintain usual level of consciousness  Outcome: Progressing Towards Goal  Goal: Complications related to the disease process, condition or treatment will be avoided or minimized  Outcome: Progressing Towards Goal     Problem: Isolation Precautions - Risk of Spread of Infection  Goal: Prevent transmission of infectious organism to others  Outcome: Progressing Towards Goal     Problem: Nutrition Deficits  Goal: Optimize nutrtional status  Outcome: Progressing Towards Goal     Problem: Risk for Fluid Volume Deficit  Goal: Maintain normal heart rhythm  Outcome: Progressing Towards Goal  Goal: Maintain absence of muscle cramping  Outcome: Progressing Towards Goal  Goal: Maintain normal serum potassium, sodium, calcium, phosphorus, and pH  Outcome: Progressing Towards Goal     Problem: Loneliness or Risk for Loneliness  Goal: Demonstrate positive use of time alone when socialization is not possible  Outcome: Progressing Towards Goal     Problem: Fatigue  Goal: Verbalize increase energy and improved vitality  Outcome: Progressing Towards Goal     Problem: Patient Education: Go to Patient Education Activity  Goal: Patient/Family Education  Outcome: Progressing Towards Goal     Problem: Falls - Risk of  Goal: *Absence of Falls  Description: Document Riki [place] Fall Risk and appropriate interventions in the flowsheet. Outcome: Progressing Towards Goal  Note: Fall Risk Interventions:  Mobility Interventions: Bed/chair exit alarm, Communicate number of staff needed for ambulation/transfer, Patient to call before getting OOB    Mentation Interventions: Adequate sleep, hydration, pain control, Bed/chair exit alarm, Evaluate medications/consider consulting pharmacy, Reorient patient    Medication Interventions: Bed/chair exit alarm, Evaluate medications/consider consulting pharmacy, Patient to call before getting OOB    Elimination Interventions: Bed/chair exit alarm, Call light in reach, Patient to call for help with toileting needs, Toilet paper/wipes in reach, Urinal in reach    History of Falls Interventions: Bed/chair exit alarm, Evaluate medications/consider consulting pharmacy         Problem: Patient Education: Go to Patient Education Activity  Goal: Patient/Family Education  Outcome: Progressing Towards Goal     Problem: Patient Education: Go to Patient Education Activity  Goal: Patient/Family Education  Outcome: Progressing Towards Goal     Problem: Patient Education: Go to Patient Education Activity  Goal: Patient/Family Education  Outcome: Progressing Towards Goal     Problem: Patient Education: Go to Patient Education Activity  Goal: Patient/Family Education  Outcome: Progressing Towards Goal     Problem: Pressure Injury - Risk of  Goal: *Prevention of pressure injury  Description: Document Syed Scale and appropriate interventions in the flowsheet.   Outcome: Progressing Towards Goal  Note: Pressure Injury Interventions:  Sensory Interventions: Assess changes in LOC    Moisture Interventions: Absorbent underpads    Activity Interventions: PT/OT evaluation    Mobility Interventions: Pressure redistribution bed/mattress (bed type), HOB 30 degrees or less    Nutrition Interventions: Offer support with meals,snacks and hydration    Friction and Shear Interventions: HOB 30 degrees or less                Problem: Patient Education: Go to Patient Education Activity  Goal: Patient/Family Education  Outcome: Progressing Towards Goal

## 2023-02-03 NOTE — PROGRESS NOTES
Problem: Risk for Spread of Infection  Goal: Prevent transmission of infectious organism to others  Description: Prevent the transmission of infectious organisms to other patients, staff members, and visitors. Outcome: Progressing Towards Goal     Problem: Airway Clearance - Ineffective  Goal: Achieve or maintain patent airway  Outcome: Progressing Towards Goal     Problem: Gas Exchange - Impaired  Goal: Absence of hypoxia  Outcome: Progressing Towards Goal  Goal: Promote optimal lung function  Outcome: Progressing Towards Goal     Problem: Breathing Pattern - Ineffective  Goal: Ability to achieve and maintain a regular respiratory rate  Outcome: Progressing Towards Goal     Problem: Body Temperature -  Risk of, Imbalanced  Goal: Ability to maintain a body temperature within defined limits  Outcome: Progressing Towards Goal  Goal: Will regain or maintain usual level of consciousness  Outcome: Progressing Towards Goal  Goal: Complications related to the disease process, condition or treatment will be avoided or minimized  Outcome: Progressing Towards Goal     Problem: Isolation Precautions - Risk of Spread of Infection  Goal: Prevent transmission of infectious organism to others  Outcome: Progressing Towards Goal     Problem: Nutrition Deficits  Goal: Optimize nutrtional status  Outcome: Progressing Towards Goal     Problem: Loneliness or Risk for Loneliness  Goal: Demonstrate positive use of time alone when socialization is not possible  Outcome: Progressing Towards Goal     Problem: Fatigue  Goal: Verbalize increase energy and improved vitality  Outcome: Progressing Towards Goal     Problem: Falls - Risk of  Goal: *Absence of Falls  Description: Document Holden Fall Risk and appropriate interventions in the flowsheet.   Outcome: Progressing Towards Goal  Note: Fall Risk Interventions:  Mobility Interventions: Bed/chair exit alarm, Communicate number of staff needed for ambulation/transfer, Patient to call before getting OOB    Mentation Interventions: Adequate sleep, hydration, pain control, Bed/chair exit alarm, Evaluate medications/consider consulting pharmacy, Reorient patient    Medication Interventions: Bed/chair exit alarm, Evaluate medications/consider consulting pharmacy, Patient to call before getting OOB    Elimination Interventions: Bed/chair exit alarm, Call light in reach, Patient to call for help with toileting needs, Toilet paper/wipes in reach, Urinal in reach    History of Falls Interventions: Bed/chair exit alarm, Evaluate medications/consider consulting pharmacy         Problem: Pressure Injury - Risk of  Goal: *Prevention of pressure injury  Description: Document Syed Scale and appropriate interventions in the flowsheet.   Outcome: Progressing Towards Goal  Note: Pressure Injury Interventions:  Sensory Interventions: Check visual cues for pain, Float heels, Keep linens dry and wrinkle-free, Minimize linen layers, Pressure redistribution bed/mattress (bed type)    Moisture Interventions: Absorbent underpads, Check for incontinence Q2 hours and as needed, Minimize layers    Activity Interventions: Increase time out of bed, Pressure redistribution bed/mattress(bed type), PT/OT evaluation    Mobility Interventions: Float heels, HOB 30 degrees or less, Pressure redistribution bed/mattress (bed type), PT/OT evaluation    Nutrition Interventions: Document food/fluid/supplement intake    Friction and Shear Interventions: HOB 30 degrees or less, Minimize layers

## 2023-02-03 NOTE — DISCHARGE SUMMARY
Discharge Summary    Patient: Makenzie Saab MRN: [id number]  CSN: [id number]    YOB: 1940  Age: [de-identified] y.o. Sex: male    DOA: 1/30/2023 LOS:  LOS: 4 days   Discharge Date:      Primary Care Provider:  Marisela Hernandez NP    Admission Diagnoses: Weakness [R53.1]    Discharge Diagnoses:    Problem List as of 2/3/2023 Date Reviewed: 3/15/2017            Codes Class Noted - Resolved    Dysphagia ICD-10-CM: R13.10  ICD-9-CM: 787.20  2/3/2023 - Present        Dementia ([facility] [address] ICD-10-CM: F03.90  ICD-9-CM: 294.20  1/31/2023 - Present        Hypertension ICD-10-CM: I10  ICD-9-CM: 401.9  1/31/2023 - Present        Acquired hypothyroidism ICD-10-CM: E03.9  ICD-9-CM: 244.9  1/31/2023 - Present        * (Principal) Weakness ICD-10-CM: R53.1  ICD-9-CM: 780.79  1/30/2023 - Present        Inability to walk ICD-10-CM: R26.2  ICD-9-CM: 719.7  1/30/2023 - Present        COVID-19 ICD-10-CM: U07.1  ICD-9-CM: 079.89  1/30/2023 - Present        DDD (degenerative disc disease), cervical ICD-10-CM: M50.30  ICD-9-CM: 722.4  3/2/2017 - Present        RESOLVED: Cervical spinal stenosis ICD-10-CM: M48.02  ICD-9-CM: 723.0  3/2/2017 - 3/17/2017           Discharge Medications:     Current Discharge Medication List        CONTINUE these medications which have NOT CHANGED    Details   furosemide (Lasix) 40 mg tablet Take 40 mg by mouth every Monday and Friday. Indications: visible water retention      omeprazole (PriLOSEC OTC) 20 mg tablet Take 20 mg by mouth Every morning. Indications: a stomach ulcer  Start date: 1/30/2023      tamsulosin (FLOMAX) 0.4 mg capsule Take 0.4 mg by mouth daily. memantine (NAMENDA) 5 mg tablet Take  by mouth daily. aspirin (ASPIRIN) 325 mg tablet Take 325 mg by mouth daily. cholecalciferol (VITAMIN D3) 1,000 unit cap Take  by mouth daily. calcium carbonate (OS-GABBY) 500 mg calcium (1,250 mg) tablet Take  by mouth daily.       metoprolol succinate (TOPROL-XL) 50 mg XL tablet Take  by mouth daily. Indications: hypertension      atorvastatin (LIPITOR) 40 mg tablet Take  by mouth nightly. Indications: hypercholesterolemia      FOLIC ACID/MV,FE,OTHER MIN (CENTRUM PO) Take  by mouth daily. levothyroxine (SYNTHROID) 50 mcg tablet Take 50 mcg by mouth Daily (before breakfast). Indications: hypothyroidism      donepeziL (ARICEPT) 10 mg tablet Take 10 mg by mouth nightly. budesonide-formoteroL (SYMBICORT) 160-4.5 mcg/actuation HFAA Take 2 Puffs by inhalation ACB/HS. STOP taking these medications       vitamin B-V-N-lutein-minerals (OCUVITE) tablet Comments:   Reason for Stopping:         amLODIPine (NORVASC) 2.5 mg tablet Comments:   Reason for Stopping:         losartan (COZAAR) 100 mg tablet Comments:   Reason for Stopping:         ferrous sulfate 325 mg (65 mg iron) tablet Comments:   Reason for Stopping:         acetaminophen-codeine (TYLENOL-CODEINE #3) 300-30 mg per tablet Comments:   Reason for Stopping:               Discharge Condition: Good    Procedures : none    Consults:  Palliative care      PHYSICAL EXAM   Visit Vitals  /64 (BP 1 Location: Left upper arm, BP Patient Position: Semi fowlers)   Pulse 93   Temp 98.2 °F (36.8 °C)   Resp 18   Ht 5' 6\" (1.676 m)   Wt 83.5 kg (184 lb)   SpO2 92%   BMI 29.70 kg/m²     General: Awake, cooperative, no acute distress    HEENT: NC, Atraumatic. PERRLA, EOMI. Anicteric sclerae. Lungs:  CTA Bilaterally. No Wheezing/Rhonchi/Rales. Heart:  Regular  rhythm,  No murmur, No Rubs, No Gallops  Abdomen: Soft, Non distended, Non tender. +Bowel sounds,   Extremities: No c/c/e  Psych:   Not anxious or agitated. Neurologic:  No acute neurological deficits. Admission HPI :   Maureen Cruz is a [de-identified] y.o. male who presents with increased confusion. Pt is not a good historian due to dementia limited his cognition.  He advised the ED team that his only complaint is that his head is big and it is swollen and twice as big as normal.  He denies headache, chest pain, shortness of breath. Per EMS they were called by family who stated he tested positive for COVID-19 on January 27, 3 days ago and has had increased confusion above baseline. Essentially family called 911 because patient is profoundly more weak than he normally is. At his baseline he is able to ambulate by walker. However now not only can he not ambulate but he also can even sit up in bed by himself without help. He lives along with his wife who is his primary caretaker. Hospital Course :   Mr. Tracey Richardson was admitted to medical floor. Weakness -  Associated with inability to ambulate   Worked with PT/OT, SNF recommended     Dysphagia -  Seen by SLP  Recommend regular thin liquid, no straws. Aspiration precautions  HOB >45 during po intake, remain >30 for 30-45 minutes after po   Small bites/sips; alternate liquid/solid with slow feeding rate   Oral care TID     Severe dementia -  Continued aricept and nemenda     HTN -  Continued home meds  Monitor BP     Hypothyroidism -  Continued with synthroid. TSH in normal range     COVID-19 infection -  No hypoxia,  Asymptomatic. Activity: Activity as tolerated    Diet: as above    Follow-up: PCP    Disposition: SNF    Minutes spent on discharge: 45       Labs: Results:       Chemistry Recent Labs     02/02/23  0410   *      K 4.1      CO2 28   BUN 13   CREA 1.02   CA 8.7   AGAP 4   BUCR 13      CBC w/Diff Recent Labs     02/02/23  0410   WBC 4.2*   RBC 4.83   HGB 14.3   HCT 45.5         Cardiac Enzymes No results for input(s): CPK, CKND1, AUREA in the last 72 hours. No lab exists for component: CKRMB, TROIP   Coagulation No results for input(s): PTP, INR, APTT, INREXT, INREXT in the last 72 hours.     Lipid Panel Lab Results   Component Value Date/Time    Cholesterol, total 177 09/14/2011 04:55 AM    HDL Cholesterol 40 09/14/2011 04:55 AM    LDL, calculated 128.4 (H) 09/14/2011 04:55 AM    VLDL, calculated 8.6 09/14/2011 04:55 AM    Triglyceride 43 09/14/2011 04:55 AM    CHOL/HDL Ratio 4.4 09/14/2011 04:55 AM      BNP No results for input(s): BNPP in the last 72 hours. Liver Enzymes No results for input(s): TP, ALB, TBIL, AP in the last 72 hours. No lab exists for component: SGOT, GPT, DBIL   Thyroid Studies Lab Results   Component Value Date/Time    TSH 2.02 02/01/2023 02:25 AM            Significant Diagnostic Studies: CT HEAD WO CONT    Result Date: 1/30/2023  EXAM: CT HEAD WO CONT INDICATION: ams COMPARISON: CT head 1/27/2023, MRI brain 9/15/2011. CONTRAST: None. TECHNIQUE: Unenhanced CT of the head was performed using 5 mm images. Brain and bone windows were generated. Coronal and sagittal reformats. CT dose reduction was achieved through use of a standardized protocol tailored for this examination and automatic exposure control for dose modulation. FINDINGS: Generalized volume loss. Widespread white matter hypodensities may reflect chronic microangiopathic change. There is no intracranial hemorrhage, extra-axial collection, or mass effect. The basilar cisterns are open. No CT evidence of acute infarct. The bone windows demonstrate no abnormalities. Extensive paranasal sinus disease. Mastoid air cells are clear. Bilateral lens replacements. No acute abnormality. CT HEAD WO CONT    Result Date: 1/27/2023  INDICATION: Altered mental status. Exam: Noncontrast CT of the brain is performed with 5 mm collimation. CT dose reduction was achieved to the use of a standardized protocol tailored for this examination and automatic exposure control for dose modulation. Direct comparison is made to prior MRI brain dated September 2011. FINDINGS: There is no acute intracranial hemorrhage, mass, mass effect or herniation. There is age-appropriate diffuse cortical atrophy with ex vacuo dilatation of the ventricular system.  There are confluent periventricular hypodensities consistent with chronic microvascular ischemic changes. There is no evidence of acute territorial infarct. The mastoid air cells are well pneumatized. No acute intracranial hemorrhage or infarct. XR CHEST PORT    Result Date: 1/30/2023  EXAM: XR CHEST PORT INDICATION: Acute mental status change COMPARISON: 1/27/2023 FINDINGS: A portable AP radiograph of the chest was obtained at 1657 hours. The patient is on a cardiac monitor. The lungs are clear. The cardiac and mediastinal contours and pulmonary vascularity are stable. The bones and soft tissues are grossly within normal limits. No acute process seen    XR CHEST PORT    Result Date: 1/27/2023  INDICATION: Altered mental status. Portable AP view of the chest. Direct comparison made to prior chest x-ray dated October 2019. Cardiomediastinal silhouette is stable, given the degree of patient rotation. Lungs are clear bilaterally. Pleural spaces are normal and there is no pneumothorax. Osseous structures are intact. No acute cardiopulmonary disease. No results found for this or any previous visit. Please note that this dictation was completed with [email or web address], the computer voice recognition software. Quite often unanticipated grammatical, syntax, homophones, and other interpretive errors are inadvertently transcribed by the computer software. Please disregard these errors. Please excuse any errors that have escaped final proofreading.      CC: Lisandro Hannah NP

## 2023-02-03 NOTE — PROGRESS NOTES
Pt laying in bed during shift assessment. He is alert and oriented x2-3. He is accepting of care and calm in demeanor. Repositioned into sitting and encouraged to keep pillow under right leg to float heel due to foot drop, pt agreed. Wound dressing replaced to right knee with xeroform due to itching. Pt has a dry cough noted but O2 sats stable and no SOB noted. Tele box in place. Pt due to discharge today to SNF. Awaiting transport time. No further concerns at this time.

## 2023-02-03 NOTE — ROUTINE PROCESS
Bedside and Verbal shift change report given to NENO Yi RN (oncoming nurse) by YANI Jones RN (offgoing nurse). Report included the following information SBAR and Kardex.

## 2023-02-03 NOTE — PROGRESS NOTES
Report given to [place] at [facility]. No new concerns at this time. Call back number provided. Awaiting arrival of transport.

## 2023-02-05 LAB
ATRIAL RATE: 75 BPM
CALCULATED P AXIS, ECG09: 46 DEGREES
CALCULATED R AXIS, ECG10: 8 DEGREES
CALCULATED T AXIS, ECG11: 36 DEGREES
DIAGNOSIS, 93000: NORMAL
P-R INTERVAL, ECG05: 120 MS
Q-T INTERVAL, ECG07: 410 MS
QRS DURATION, ECG06: 82 MS
QTC CALCULATION (BEZET), ECG08: 457 MS
VENTRICULAR RATE, ECG03: 75 BPM

## 2023-02-08 NOTE — PROGRESS NOTES
Physician Progress Note      PATIENT:               Micha Cavazos  CSN #:                  [id number]  :                       1940  ADMIT DATE:       2023 4:44 PM  [address] DATE:        2/3/2023 2:55 PM  RESPONDING  PROVIDER #:        Herb Chao MD          QUERY TEXT:    Pt admitted with weakness associated with inability to ambulate . Pt noted to have severe dementia . If possible, please document in progress notes and discharge summary the relationship, if any, between weakness and dementia. The medical record reflects the following:  Risk Factors: dementia  Clinical Indicators:  weakness associated with inability to ambulate . Severe dementia  Treatment: PT/OT , aricept and nemenda    Thank You  Ventura Li RN, CDI, CRCR,  Options provided:  -- Weakness due to severe dementia  -- Weakness unrelated to severe dementia  -- Other - I will add my own diagnosis  -- Disagree - Not applicable / Not valid  -- Disagree - Clinically unable to determine / Unknown  -- Refer to Clinical Documentation Reviewer    PROVIDER RESPONSE TEXT:    Provider is clinically unable to determine a response to this query.     Query created by: Je Bernal on 2/3/2023 2:13 PM      Electronically signed by:  Herb Chao MD 2023 7:28 PM

## 2023-06-06 ENCOUNTER — HOSPITAL ENCOUNTER (OUTPATIENT)
Facility: HOSPITAL | Age: 83
Setting detail: RECURRING SERIES
Discharge: HOME | End: 2023-06-09
Payer: MEDICARE

## 2023-06-06 PROCEDURE — 97110 THERAPEUTIC EXERCISES: CPT

## 2023-06-06 PROCEDURE — 97162 PT EVAL MOD COMPLEX 30 MIN: CPT

## 2023-06-06 PROCEDURE — 97165 OT EVAL LOW COMPLEX 30 MIN: CPT

## 2023-06-06 PROCEDURE — 97530 THERAPEUTIC ACTIVITIES: CPT

## 2023-06-06 NOTE — PROGRESS NOTES
[facility]  [address] Kathy Haas, [address] [place]  Ph [phone]  Fx [phone]    Plan of Care/Statement of Necessity for Occupational Therapy Services        Patient name: Richa Landaverde Start of Care: 2023   Referral source: Joel Lee PA-C : 1940    Medical Diagnosis: Muscle weakness (generalized) Onset Date: 23   Treatment Diagnosis: Muscle weakness (generalized) [M62.81]                                                 Prior Hospitalization: see medical history Provider#: [id number]   Medications: Verified on Patient Summary List   Insurance: Payor: Denton Covert / Plan: MEDICARE PART A AND B / Product Type: *No Product type* /       Comorbidities: Cervical disc degeneration, dementia, unspecified osteoarthritis, GERD, constipation, HTN, hypothyroidism, hyperlipidemia, dysphagia, benign prostatic hyperplasia with lower urinary tract symptoms, vitamin D deficiency, uscle weakness, unspecified protein calorie malnutrition. Prior Level of Function:  Pt lived with spouse, required min-mod A with ADLs, did not engage in IADLs. The Plan of Care and following information is based on the information from the initial evaluation. Assessment/key information: Pt is a pleasant and motivated [de-identified] y.o. R handed male with history of cervical disc degeneration, dementia, unspecified osteoarthritis, GERD, constipation, HTN, hypothyroidism, hyperlipidemia, dysphagia, benign prostatic hyperplasia with lower urinary tract symptoms, vitamin D deficiency, uscle weakness, unspecified protein calorie malnutrition. He presents to clinic for outpatient OT evaluation s/p hospitalization and extended rehab stay January-May for COVID-19, UTI, weakness. He is accompanied by his spouse who is pushing him in a transport chair.  Pt in transport chair with hunched forward-head posture and sacral sitting, though he is able to orient pelvis to neutral postion when cued to scoot back and
assist from spouse for task     4. Pt and spouse will demonstrate I in carryover of HEP program as demonstrated by teach-back and caregiver report to increase pt strength and activity tolerance for ADLs in a progressive manner. Eval status: No HEP yet  ___________________________________________________________     Long Term Goals: To be accomplished in 6 weeks:                 Pt will demonstrate improved gross  strength by 15 lbs on each hand to promote increased safety and I in functional mobility and ADL self care tasks. Eval status: 23.67 lbs R hand, 22 lbs L hand     3. Pt will demonstrate management of items down/up over his hips to include STS transfer with SBA and min verbal cues only to promote increased safety and I in toileting tasks. Eval status:     3. Pt will be able to demonstrate all aspects of lower body dressing tasks with distant SUP for safety and decreased caregiver burden. Eval status:     4. Patient will improve FOTO score by 20 points to improve functional tolerance for ADLs, preferred occupations, functional mobility tasks.   Eval Status: FOTO : 34     FOTO score = an established functional score where 100 = no disability         PLAN  [x]  Upgrade activities as tolerated     []  Continue plan of care  []  Update interventions per flow sheet       []  Discharge due to:_  []  Other:_      Nadja Hair, MOT/S, WATSON/L, CFWE 6/6/2023  8:16 AM

## 2023-06-06 NOTE — PROGRESS NOTES
will increase strength to 5/5 throughout Angel LEs to aid in return recreational activities and ADLs. Status at IE:   Hip Left Right   Flexion 4 4   Abduction 4* 4*   Adduction 4* 4*   Knee Left Right   Extension 4 4   Ankle     Dorsiflexion 4 4   Current:    Patient will improve Tinetti score to 18/28 to demonstrate decreased risk for falls. Status at IE: 7/28  Current:    Patient will ambulate 200ft on level surface with RW and improved foot clearance angel and improved knee extension during stance phase. Status at IE:decreased foot clearance angel, right foot drop, decreased Angel knee extension  Right> Left  Current:    Patient will decrease TUG by 40 seconds to display MCID and progression to decreased falls risk. Status at IE: 1 min 47 sec   Current:    Patient will be able to complete 5 x STS in to display MCID and progression to decreased falls risk and improved pts ind in household mobility. Status at IE: unable   Current:    6. Patient will improve FOTO to 11 points overall to demonstrate improvement in functional ability.   Status at IE:34  FOTO score = an established functional score where 100 = no disability  Current:  PLAN  []  Upgrade activities as tolerated     [x]  Continue plan of care  []  Update interventions per flow sheet       []  Discharge due to:_  []  Other:_      Marquis Berger, PT 6/6/2023  10:22 AM
Kitty Mansfield PA-C  Insurance: Payor: Nkechi Schmitt / Plan: MEDICARE PART A AND B / Product Type: *No Product type* /      ** Signature, Date and Time must be completed for valid certification **    [facility] [address] Latonya Gutierrez, [address] [facility]  Ph [phone]  Fx [phone]

## 2023-06-16 ENCOUNTER — HOSPITAL ENCOUNTER (OUTPATIENT)
Facility: HOSPITAL | Age: 83
Setting detail: RECURRING SERIES
Discharge: HOME | End: 2023-06-19
Payer: MEDICARE

## 2023-06-16 PROCEDURE — 97530 THERAPEUTIC ACTIVITIES: CPT

## 2023-06-16 PROCEDURE — 97110 THERAPEUTIC EXERCISES: CPT

## 2023-06-16 PROCEDURE — 97112 NEUROMUSCULAR REEDUCATION: CPT

## 2023-06-22 ENCOUNTER — HOSPITAL ENCOUNTER (OUTPATIENT)
Facility: HOSPITAL | Age: 83
Setting detail: RECURRING SERIES
Discharge: HOME | End: 2023-06-25
Payer: MEDICARE

## 2023-06-22 PROCEDURE — 97530 THERAPEUTIC ACTIVITIES: CPT

## 2023-06-22 PROCEDURE — 97110 THERAPEUTIC EXERCISES: CPT

## 2023-06-22 PROCEDURE — 97116 GAIT TRAINING THERAPY: CPT

## 2023-06-23 ENCOUNTER — HOSPITAL ENCOUNTER (OUTPATIENT)
Facility: HOSPITAL | Age: 83
Setting detail: RECURRING SERIES
Discharge: HOME | End: 2023-06-26
Payer: MEDICARE

## 2023-06-23 PROCEDURE — 97110 THERAPEUTIC EXERCISES: CPT

## 2023-06-23 PROCEDURE — 97116 GAIT TRAINING THERAPY: CPT

## 2023-06-23 PROCEDURE — 97112 NEUROMUSCULAR REEDUCATION: CPT

## 2023-06-23 PROCEDURE — 97530 THERAPEUTIC ACTIVITIES: CPT

## 2023-06-27 ENCOUNTER — HOSPITAL ENCOUNTER (OUTPATIENT)
Facility: HOSPITAL | Age: 83
Setting detail: RECURRING SERIES
Discharge: HOME | End: 2023-06-30
Payer: MEDICARE

## 2023-06-27 PROCEDURE — 97110 THERAPEUTIC EXERCISES: CPT

## 2023-06-27 PROCEDURE — 97530 THERAPEUTIC ACTIVITIES: CPT

## 2023-06-27 PROCEDURE — 97112 NEUROMUSCULAR REEDUCATION: CPT

## 2023-06-30 ENCOUNTER — HOSPITAL ENCOUNTER (OUTPATIENT)
Facility: HOSPITAL | Age: 83
Setting detail: RECURRING SERIES
End: 2023-06-30
Payer: MEDICARE

## 2023-06-30 PROCEDURE — 97530 THERAPEUTIC ACTIVITIES: CPT

## 2023-06-30 PROCEDURE — 97110 THERAPEUTIC EXERCISES: CPT

## 2023-06-30 PROCEDURE — 97112 NEUROMUSCULAR REEDUCATION: CPT

## 2023-07-10 ENCOUNTER — HOSPITAL ENCOUNTER (OUTPATIENT)
Facility: HOSPITAL | Age: 83
Setting detail: RECURRING SERIES
Discharge: HOME | End: 2023-07-13
Payer: MEDICARE

## 2023-07-10 PROCEDURE — 97535 SELF CARE MNGMENT TRAINING: CPT

## 2023-07-10 PROCEDURE — 97112 NEUROMUSCULAR REEDUCATION: CPT

## 2023-07-10 PROCEDURE — 97530 THERAPEUTIC ACTIVITIES: CPT

## 2023-07-10 NOTE — PROGRESS NOTES
return to prior level of function. ASSESSMENT/RECOMMENDATIONS:    Continue per plan of care.      Thank you for this referral.   Jeannine Malave PTA 7/10/2023 4:42 PM
UE support in 1 minute 9 seconds with CGA 6/16/23 Progressing     6. Patient will improve FOTO to 11 points overall to demonstrate improvement in functional ability.   Status at IE:34  Current: 50 7/10/23 MET  FOTO score = an established functional score where 100 = no disability           PLAN  Yes  Continue plan of care  []  Upgrade activities as tolerated  []  Discharge due to :  []  Other:    Trinity Ferrer, PTA    7/10/2023    12:08 PM    Future Appointments   Date Time Provider [id number]   7/10/2023  2:30 PM Duaine Guyory, PTA [facility]   7/14/2023 12:30 PM Duaine Savory, [facility]   7/17/2023  3:10 PM Duaine Savory, [facility]   7/21/2023  1:10 PM Marcos Marta, OT [facility]   7/21/2023  1:50 PM Anthony Bell PT [facility]   7/24/2023  3:10 PM Duaine Savory, PTA [facility]   7/24/2023  3:50 PM Marcos Marta, OT [facility]   7/28/2023 11:10 AM Duaine Savory, [facility]   7/28/2023 11:50 AM Marcos Marta, OT [facility]   7/31/2023  2:30 PM Marcos Marta, OT [facility]   7/31/2023  3:10 PM Duaine Savory, PTA [facility]   8/4/2023 11:10 AM Duaine Savory, PTA [facility]   8/4/2023 11:50 AM Marcos Marta, OT [facility]   8/7/2023  2:30 PM Marcos Marta, OT [facility]   8/7/2023  3:10 PM Anthony Bell [facility]   8/11/2023 11:50 AM Marcos Marta, OT [facility]   8/11/2023 12:30 PM Duaine Savory, PTA [facility]   8/14/2023  2:30 PM Marcos Marta, OT [facility]   8/14/2023  3:10 PM Anthony Bell [facility]   8/18/2023 11:50 AM Marcos Marta, OT [facility]   8/18/2023 12:30 PM Duaine Savory, PTA [facility]   8/21/2023  2:30 PM Marcos Marta, OT [facility]   8/21/2023  3:10 PM Duaine Savory, [facility]   8/25/2023  1:10 PM Duaine Savory, PTA [facility]   8/28/2023  2:30 PM Marcos Marta, OT [facility]   8/28/2023  3:10 PM Duaine Savory, PTA [facility]   9/1/2023 11:50 AM Marcos Marta, OT [facility]   9/1/2023 12:30 PM Duaine Savory, PTA [facility]

## 2023-07-14 ENCOUNTER — HOSPITAL ENCOUNTER (OUTPATIENT)
Facility: HOSPITAL | Age: 83
Setting detail: RECURRING SERIES
Discharge: HOME | End: 2023-07-17
Payer: MEDICARE

## 2023-07-14 PROCEDURE — 97112 NEUROMUSCULAR REEDUCATION: CPT

## 2023-07-14 PROCEDURE — 97535 SELF CARE MNGMENT TRAINING: CPT

## 2023-07-14 PROCEDURE — 97110 THERAPEUTIC EXERCISES: CPT

## 2023-07-14 PROCEDURE — 97530 THERAPEUTIC ACTIVITIES: CPT

## 2023-07-17 ENCOUNTER — HOSPITAL ENCOUNTER (OUTPATIENT)
Facility: HOSPITAL | Age: 83
Setting detail: RECURRING SERIES
Discharge: HOME | End: 2023-07-20
Payer: MEDICARE

## 2023-07-17 PROCEDURE — 97110 THERAPEUTIC EXERCISES: CPT

## 2023-07-17 PROCEDURE — 97530 THERAPEUTIC ACTIVITIES: CPT

## 2023-07-17 PROCEDURE — 97535 SELF CARE MNGMENT TRAINING: CPT

## 2023-07-17 NOTE — PROGRESS NOTES
PHYSICAL / OCCUPATIONAL THERAPY - DAILY TREATMENT NOTE (updated )    Patient Name: Sarah Other    Date: 2023    : 1940  Insurance: Payor: MEDICARE / Plan: MEDICARE PART A AND B / Product Type: *No Product type* /      Patient  verified Yes     Visit #   Current / Total 11 16   Time   In / Out 3:10 3:50   Pain   In / Out 0/10 0/10   Subjective Functional Status/Changes: \"I'm ok. \"   Changes to: Allergies, Med Hx, Sx Hx?   no       TREATMENT AREA =  Other abnormalities of gait and mobility [R26.89]    OBJECTIVE    Therapeutic Procedures: Tx Min Billable or 1:1 Min (if diff from Tx Min) Procedure, Rationale, Specifics   20  55660 Therapeutic Exercise (timed):  increase ROM, strength, coordination, balance, and proprioception to improve patient's ability to progress to PLOF and address remaining functional goals. (see flow sheet as applicable)     Details if applicable:         94067 Neuromuscular Re-Education (timed):  improve balance, coordination, kinesthetic sense, posture, core stability and proprioception to improve patient's ability to develop conscious control of individual muscles and awareness of position of extremities in order to progress to PLOF and address remaining functional goals. (see flow sheet as applicable)     Details if applicable:     10 10 40137 Therapeutic Activity (timed):  use of dynamic activities replicating functional movements to increase ROM, strength, coordination, balance, and proprioception in order to improve patient's ability to progress to PLOF and address remaining functional goals. (see flow sheet as applicable)     Details if applicable:     10 10 97993 Self Care/Home Management (timed):  improve patient knowledge and understanding of pain reducing techniques, positioning, and posture/ergonomics  to improve patient's ability to progress to PLOF and address remaining functional goals.   (see flow sheet as applicable)     Details if applicable:

## 2023-07-21 ENCOUNTER — HOSPITAL ENCOUNTER (OUTPATIENT)
Facility: HOSPITAL | Age: 83
Setting detail: RECURRING SERIES
Discharge: HOME | End: 2023-07-24
Payer: MEDICARE

## 2023-07-21 PROCEDURE — 97110 THERAPEUTIC EXERCISES: CPT

## 2023-07-21 PROCEDURE — 97112 NEUROMUSCULAR REEDUCATION: CPT

## 2023-07-21 PROCEDURE — 97530 THERAPEUTIC ACTIVITIES: CPT

## 2023-07-21 PROCEDURE — 97535 SELF CARE MNGMENT TRAINING: CPT

## 2023-07-21 NOTE — PROGRESS NOTES
PHYSICAL / OCCUPATIONAL THERAPY - DAILY TREATMENT NOTE (updated )    Patient Name: Kai Dumont    Date: 2023    : 1940  Insurance: Payor: MEDICARE / Plan: MEDICARE PART A AND B / Product Type: *No Product type* /      Patient  verified Yes     Visit #   Current / Total 6 12   Time   In / Out 1:18 1:50   Pain   In / Out 0/10 0/10   Subjective Functional Status/Changes: Pt reports no new or acute changes. Pt maintains pleasantly confused and agreeable to session with encouragement. Changes to: Allergies, Med Hx, Sx Hx?   no       TREATMENT AREA =  Muscle weakness (generalized) [M62.81]    OBJECTIVE    Therapeutic Procedures: Tx Min Billable or 1:1 Min (if diff from Tx Min) Procedure, Rationale, Specifics   20  96324 Therapeutic Exercise (timed):  increase ROM, strength, coordination, balance, and proprioception to improve patient's ability to progress to PLOF and address remaining functional goals. (see flow sheet as applicable)     Details if applicable:       22  48362 Therapeutic Activity (timed):  use of dynamic activities replicating functional movements to increase ROM, strength, coordination, balance, and proprioception in order to improve patient's ability to progress to PLOF and address remaining functional goals.   (see flow sheet as applicable)     Details if applicable:     43  [facility] Totals Reminder: bill using total billable min of TIMED therapeutic procedures (example: do not include dry needle or estim unattended, both untimed codes, in totals to left)  8-22 min = 1 unit; 23-37 min = 2 units; 38-52 min = 3 units; 53-67 min = 4 units; 68-82 min = 5 units   Total Total     TOTAL TREATMENT TIME:        42     [x]  Patient Education billed concurrently with other procedures   [x] Review HEP    [] Progressed/Changed HEP, detail:    [] Other detail:       Objective Information/Functional Measures/Assessment     strengthening tasks with resistive clips for increased ability to

## 2023-07-21 NOTE — PROGRESS NOTES
to complete 5 x STS in 20 seconds to display MCID and progression to decreased falls risk and improved pts ind in household mobility. Status at IE: unable   PN 7/10/23: Performed 5 x STS with ron UE support in 1 minute 9 seconds with CGA 6/16/23 Progressing   Current:      6. Patient will improve FOTO to 11 points overall to demonstrate improvement in functional ability.   Status at IE:34  Current: 50 7/10/23 MET  FOTO score = an established functional score where 100 = no disability    PLAN  Yes  Continue plan of care  []  Upgrade activities as tolerated  []  Discharge due to :  []  Other:    Portillo Pollen, SPT    7/21/2023    2:10 PM    Future Appointments   Date Time Provider [address]   7/24/2023  3:10 PM Ever Boroughs [facility]   7/24/2023  3:50 PM Honorio Garcia OT [facility]   7/28/2023 11:10 AM Ever Boroughs [facility]   7/28/2023 11:50 AM Honorio Garcia OT [facility]   7/31/2023  2:30 PM Honorio Garcia OT [facility]   7/31/2023  3:10 PM Ever Boroughs [facility]   8/4/2023 11:10 AM Ever Boroughs [facility]   8/4/2023 11:50 AM Honorio Garcia OT [facility]   8/7/2023  2:30 PM Honorio Garcia OT [facility]   8/7/2023  3:10 PM Bette Hinojosa PT [facility]   8/11/2023 11:50 AM Honorio Garcia OT [facility]   8/11/2023 12:30 PM Ever Boroughs, [facility]   8/14/2023  2:30 PM Honorio Garcia OT [facility]   8/14/2023  3:10 PM Bette Hinojosa PT [facility]   8/18/2023 11:50 AM Honorio Garcia OT [facility]   8/18/2023 12:30 PM Ever Boroughs, [facility]   8/21/2023  2:30 PM Honorio Garcia OT [facility]   8/21/2023  3:10 PM Ever Boroughs, PTA [facility]   8/25/2023  1:10 PM Ever Boroughs, [facility]   8/28/2023  2:30 PM Honorio Garcia OT [facility]   8/28/2023  3:10 PM Ever Boroughs, [facility]   9/1/2023 11:50 AM Honorio Garcia OT [facility]   9/1/2023 12:30 PM Ever Boroughs, [facility]

## 2023-07-24 ENCOUNTER — HOSPITAL ENCOUNTER (OUTPATIENT)
Facility: HOSPITAL | Age: 83
Setting detail: RECURRING SERIES
Discharge: HOME | End: 2023-07-27
Payer: MEDICARE

## 2023-07-24 PROCEDURE — 97110 THERAPEUTIC EXERCISES: CPT

## 2023-07-24 PROCEDURE — 97535 SELF CARE MNGMENT TRAINING: CPT

## 2023-07-24 PROCEDURE — 97112 NEUROMUSCULAR REEDUCATION: CPT

## 2023-07-24 PROCEDURE — 97530 THERAPEUTIC ACTIVITIES: CPT

## 2023-07-24 NOTE — PROGRESS NOTES
PHYSICAL / OCCUPATIONAL THERAPY - DAILY TREATMENT NOTE (updated )    Patient Name: Zhanna Current    Date: 2023    : 1940  Insurance: Payor: MEDICARE / Plan: MEDICARE PART A AND B / Product Type: *No Product type* /      Patient  verified Yes     Visit #   Current / Total 13 16   Time   In / Out 3:10 4:05   Pain   In / Out 0/10 0/10   Subjective Functional Status/Changes: \"I don't have any pain. \"   Changes to: Allergies, Med Hx, Sx Hx?   no       TREATMENT AREA =  Other abnormalities of gait and mobility [R26.89]    OBJECTIVE    Therapeutic Procedures: Tx Min Billable or 1:1 Min (if diff from Tx Min) Procedure, Rationale, Specifics   20  77734 Therapeutic Exercise (timed):  increase ROM, strength, coordination, balance, and proprioception to improve patient's ability to progress to PLOF and address remaining functional goals. (see flow sheet as applicable)     Details if applicable:       10 10 83171 Neuromuscular Re-Education (timed):  improve balance, coordination, kinesthetic sense, posture, core stability and proprioception to improve patient's ability to develop conscious control of individual muscles and awareness of position of extremities in order to progress to PLOF and address remaining functional goals. (see flow sheet as applicable)     Details if applicable:     15 15 52071 Therapeutic Activity (timed):  use of dynamic activities replicating functional movements to increase ROM, strength, coordination, balance, and proprioception in order to improve patient's ability to progress to PLOF and address remaining functional goals. (see flow sheet as applicable)     Details if applicable:     10 10 69786 Self Care/Home Management (timed):  improve patient knowledge and understanding of pain reducing techniques, positioning, and posture/ergonomics  to improve patient's ability to progress to PLOF and address remaining functional goals.   (see flow sheet as applicable)     Details if

## 2023-07-24 NOTE — PROGRESS NOTES
PHYSICAL / OCCUPATIONAL THERAPY - DAILY TREATMENT NOTE (updated )    Patient Name: Rojas José    Date: 2023    : 1940  Insurance: Payor: MEDICARE / Plan: MEDICARE PART A AND B / Product Type: *No Product type* /      Patient  verified Yes     Visit #   Current / Total 7 12   Time   In / Out 04:06 04:36   Pain   In / Out 0/10 0/10   Subjective Functional Status/Changes: Pt pleasant and agreeable to session. Reports no new or acute complaints. Changes to: Allergies, Med Hx, Sx Hx?   no       TREATMENT AREA =  Muscle weakness (generalized) [M62.81]    OBJECTIVE    Therapeutic Procedures: Tx Min Billable or 1:1 Min (if diff from Tx Min) Procedure, Rationale, Specifics   15  50725 Therapeutic Exercise (timed):  increase ROM, strength, coordination, balance, and proprioception to improve patient's ability to progress to PLOF and address remaining functional goals. (see flow sheet as applicable)     Details if applicable:       15  42294 Therapeutic Activity (timed):  use of dynamic activities replicating functional movements to increase ROM, strength, coordination, balance, and proprioception in order to improve patient's ability to progress to PLOF and address remaining functional goals. (see flow sheet as applicable)     Details if applicable:     27  [facility] Totals Reminder: bill using total billable min of TIMED therapeutic procedures (example: do not include dry needle or estim unattended, both untimed codes, in totals to left)  8-22 min = 1 unit; 23-37 min = 2 units; 38-52 min = 3 units; 53-67 min = 4 units; 68-82 min = 5 units   Total Total     TOTAL TREATMENT TIME:        30     [x]  Patient Education billed concurrently with other procedures   [x] Review HEP    [] Progressed/Changed HEP, detail:    [] Other detail:       Objective Information/Functional Measures/Assessment    Pt has been seen for course of 7 OP OT visits since 23 evaluation.  He has engaged in therapeutic

## 2023-07-24 NOTE — PROGRESS NOTES
[facility] at [address] Kerwin Garay, [address] [facility]  Ph [phone]  Fx [phone]    Occupational Therapy Progress Note  Patient name: Noelle Victoria Start of Care: 23   Referral source: Joselyn Atkinson PA-C : 1940   Medical/Treatment Diagnosis: Muscle weakness (generalized) [M62.81] Onset Date:23   Prior Hospitalization: see medical history Provider#: [id number]   Medications: Verified on Patient Summary List      Comorbidities: Cervical disc degeneration, dementia, unspecified osteoarthritis, GERD, constipation, HTN, hypothyroidism, hyperlipidemia, dysphagia, benign prostatic hyperplasia with lower urinary tract symptoms, vitamin D deficiency, muscle weakness, unspecified protein calorie malnutrition. Prior Level of Function: Pt lived with spouse, required min-mod A with ADLs, did not engage in IADLs. Visits from Start of Care: 7    Missed Visits: 0      Goals/Measure of Progress:    Short Term Goals: To be accomplished in 3 weeks:     1. Pt will demonstrate improved gross  strength by 8 lbs on each hand to promote increased safety and I in functional mobility and ADL self care tasks. Eval status: 52.18 lbs R hand, 48.5 lbs L hand *  * erroneously documented kgs as lbs on eval; unit conversion has been corrected in this progress notes. 23: 45 lbs R hand, 58 lbs L hand, goal progressing  23:23: R hand 53.3, L hand 62, goal progressing     2. Pt will demonstrate increased radial deviation by 10 degrees to promote improved item manipulation and ability to complete self-feeding tasks to include cutting food items. Eval status: Not measured due to time constraints. 23: 12 degrees right hand, 20 degrees left hand, baseline  23: 12 degrees right hand, 15 degrees left hand, decreased, likely 2/2 increased fatigue and weightbearing through hands in earlier therapy today     3.  Pt will demonstrate management of items down/up

## 2023-07-28 ENCOUNTER — APPOINTMENT (OUTPATIENT)
Facility: HOSPITAL | Age: 83
End: 2023-07-28
Payer: MEDICARE

## 2023-07-28 ENCOUNTER — TELEPHONE (OUTPATIENT)
Facility: HOSPITAL | Age: 83
End: 2023-07-28

## 2023-07-31 ENCOUNTER — HOSPITAL ENCOUNTER (OUTPATIENT)
Facility: HOSPITAL | Age: 83
Setting detail: RECURRING SERIES
Discharge: HOME | End: 2023-08-03
Payer: MEDICARE

## 2023-07-31 PROCEDURE — 97530 THERAPEUTIC ACTIVITIES: CPT

## 2023-07-31 PROCEDURE — 97110 THERAPEUTIC EXERCISES: CPT

## 2023-07-31 PROCEDURE — 97535 SELF CARE MNGMENT TRAINING: CPT

## 2023-07-31 NOTE — PROGRESS NOTES
seconds to display MCID and progression to decreased falls risk. Status at IE: 1 min 47 sec   PN 7/10/23: 1 min 10 sec with RW 7/10/23 Progressing  Current:      Patient will be able to complete 5 x STS in 20 seconds to display MCID and progression to decreased falls risk and improved pts ind in household mobility. Status at IE: unable   PN 7/10/23: Performed 5 x STS with ron UE support in 1 minute 9 seconds with CGA 6/16/23 Progressing   Current: Pt continues to required UE support for STS 7/31/23     6. Patient will improve FOTO to 11 points overall to demonstrate improvement in functional ability.   Status at IE:34  Current: 50 7/10/23 MET  FOTO score = an established functional score where 100 = no disability    PLAN  Yes  Continue plan of care  []  Upgrade activities as tolerated  []  Discharge due to :  []  Other:    Celestino Luu PTA    7/31/2023    11:05 AM    Future Appointments   Date Time Provider [address]   7/31/2023  2:30 PM Jh Miranda OT [facility]   7/31/2023  3:10 PM Celestino Luu PTA [facility]   8/4/2023 11:10 AM Celestino Luu [facility]   8/4/2023 11:50 AM Jh Miranda [facility]   8/7/2023  2:30 PM Jh Miranda OT [facility]   8/7/2023  3:10 PM Grupo Montgomery PT [facility]   8/11/2023 11:50 AM Jh Miranda [facility]   8/11/2023 12:30 PM Celestino Luu PTA [facility]   8/14/2023  2:30 PM Jh Miranda OT [facility]   8/14/2023  3:10 PM Grupo Montgomery PT [facility]   8/18/2023 11:50 AM Jh Miranda [facility]   8/18/2023 12:30 PM Celestino Luu [facility]   8/21/2023  2:30 PM Jh Miranda [facility]   8/21/2023  3:10 PM Celestino Luu [facility]   8/25/2023  1:10 PM Celestino Luu [facility]   8/28/2023  2:30 PM Jh Miranda [facility]   8/28/2023  3:10 PM Celestino Luu [facility]   9/1/2023 11:50 AM Jh Miranda [facility]   9/1/2023 12:30 PM Celestino Luu [facility]

## 2023-07-31 NOTE — PROGRESS NOTES
eval; unit conversion has been   corrected in this progress notes. 06/26/23: 45 lbs R hand, 58 lbs L hand, goal progressing  07/24/23:07/24/23: R hand 53.3, L hand 62, goal progressing     2. Pt will demonstrate increased radial deviation by 10 degrees to promote improved item manipulation and ability to complete self-feeding tasks to include cutting food items. Eval status: Not measured due to time constraints. 06/26/23: 12 degrees right hand, 20 degrees left hand, baseline  07/24/23: 12 degrees right hand, 15 degrees left hand, decreased, likely 2/2 increased fatigue and weightbearing through hands in earlier therapy today     3. Pt will demonstrate management of items down/up over his hips to include STS transfer with SBA and min verbal cues only to promote increased safety and I in toileting tasks. Eval status: Pt requires assist from spouse for task  06/26/23: Alexey Silva for management of items and to maintain standing balance; goal progressing  07/24/23: CGA for safety and balance during standing with verbal cues for technique to shilo item over right leg first and to stand; goal progressing     4. Pt and spouse will demonstrate I in carryover of HEP program as demonstrated by teach-back and caregiver report to increase pt strength and activity tolerance for ADLs in a progressive manner. Eval status: No HEP yet  06/26/23: Pt completes at home with wife sometimes; goal progressing  07/24/23: Pt reports he doesn't like to do his exercises, does them sometimes; goal ongoing  ___________________________________________________________     Long Term Goals: To be accomplished in 6 weeks:                 1. Pt will demonstrate improved gross  strength by 15 lbs on each hand to promote increased safety and I in functional mobility and ADL self care tasks.     Eval status: 52.18 lbs R hand, 48.5 lbs L hand **  **erroneously documented Kgs as Lbs on eval; unit conversion has been corrected in this progress

## 2023-08-04 ENCOUNTER — HOSPITAL ENCOUNTER (OUTPATIENT)
Facility: HOSPITAL | Age: 83
Setting detail: RECURRING SERIES
Discharge: HOME | End: 2023-08-07
Payer: MEDICARE

## 2023-08-04 PROCEDURE — 97530 THERAPEUTIC ACTIVITIES: CPT

## 2023-08-04 PROCEDURE — 97112 NEUROMUSCULAR REEDUCATION: CPT

## 2023-08-04 PROCEDURE — 97110 THERAPEUTIC EXERCISES: CPT

## 2023-08-04 NOTE — PROGRESS NOTES
PHYSICAL / OCCUPATIONAL THERAPY - DAILY TREATMENT NOTE (updated )    Patient Name: Gurpreet Shah    Date: 2023    : 1940  Insurance: Payor: MEDICARE / Plan: MEDICARE PART A AND B / Product Type: *No Product type* /      Patient  verified Yes     Visit #   Current / Total 9 12   Time   In / Out 1155 1235   Pain   In / Out 7 7   Subjective Functional Status/Changes: Pt reports he is tired, he has not been dong exercises at home    Changes to: Allergies, Med Hx, Sx Hx?   no       TREATMENT AREA =  Muscle weakness (generalized) [M62.81]    OBJECTIVE      Therapeutic Procedures: Tx Min Billable or 1:1 Min (if diff from Tx Min) Procedure, Rationale, Specifics   15  48746 Therapeutic Exercise (timed):  increase ROM, strength, coordination, balance, and proprioception to improve patient's ability to progress to PLOF and address remaining functional goals. (see flow sheet as applicable)     Details if applicable:       15  10006 Therapeutic Activity (timed):  use of dynamic activities replicating functional movements to increase ROM, strength, coordination, balance, and proprioception in order to improve patient's ability to progress to PLOF and address remaining functional goals. (see flow sheet as applicable)     Details if applicable:     10  54433 Neuromuscular Re-Education (timed):  improve balance, coordination, kinesthetic sense, posture, core stability and proprioception to improve patient's ability to develop conscious control of individual muscles and awareness of position of extremities in order to progress to PLOF and address remaining functional goals.  (see flow sheet as applicable)     Details if applicable:               36  [facility] Totals Reminder: bill using total billable min of TIMED therapeutic procedures (example: do not include dry needle or estim unattended, both untimed codes, in totals to left)  8-22 min = 1 unit; 23-37 min = 2 units; 38-52 min = 3 units; 53-67 min = 4 units;

## 2023-08-04 NOTE — PROGRESS NOTES
PHYSICAL / OCCUPATIONAL THERAPY - DAILY TREATMENT NOTE (updated )    Patient Name: Timi Kaplan    Date: 2023    : 1940  Insurance: Payor: MEDICARE / Plan: MEDICARE PART A AND B / Product Type: *No Product type* /      Patient  verified Yes     Visit #   Current / Total 15 24   Time   In / Out 11:15 11:55   Pain   In / Out 0/10 0/10   Subjective Functional Status/Changes: \"I don't have any pain right now. \"   Changes to: Allergies, Med Hx, Sx Hx?   no       TREATMENT AREA =  Other abnormalities of gait and mobility [R26.89]    OBJECTIVE    Therapeutic Procedures: Tx Min Billable or 1:1 Min (if diff from Tx Min) Procedure, Rationale, Specifics   20  92000 Therapeutic Exercise (timed):  increase ROM, strength, coordination, balance, and proprioception to improve patient's ability to progress to PLOF and address remaining functional goals. (see flow sheet as applicable)     Details if applicable:       10 10 97435 Neuromuscular Re-Education (timed):  improve balance, coordination, kinesthetic sense, posture, core stability and proprioception to improve patient's ability to develop conscious control of individual muscles and awareness of position of extremities in order to progress to PLOF and address remaining functional goals. (see flow sheet as applicable)     Details if applicable:     10 10 78357 Therapeutic Activity (timed):  use of dynamic activities replicating functional movements to increase ROM, strength, coordination, balance, and proprioception in order to improve patient's ability to progress to PLOF and address remaining functional goals. (see flow sheet as applicable)     Details if applicable:       09258 Self Care/Home Management (timed):  improve patient knowledge and understanding of pain reducing techniques, positioning, and posture/ergonomics  to improve patient's ability to progress to PLOF and address remaining functional goals.   (see flow sheet as applicable)

## 2023-08-07 ENCOUNTER — HOSPITAL ENCOUNTER (OUTPATIENT)
Facility: HOSPITAL | Age: 83
Setting detail: RECURRING SERIES
Discharge: HOME | End: 2023-08-10
Payer: MEDICARE

## 2023-08-07 PROCEDURE — 97530 THERAPEUTIC ACTIVITIES: CPT

## 2023-08-07 PROCEDURE — 97110 THERAPEUTIC EXERCISES: CPT

## 2023-08-07 PROCEDURE — 97116 GAIT TRAINING THERAPY: CPT

## 2023-08-07 PROCEDURE — 97112 NEUROMUSCULAR REEDUCATION: CPT

## 2023-08-07 NOTE — PROGRESS NOTES
progressing     2. Pt will demonstrate increased radial deviation by 10 degrees to promote improved item manipulation and ability to complete self-feeding tasks to include cutting food items. Eval status: Not measured due to time constraints. 06/26/23: 12 degrees right hand, 20 degrees left hand, baseline  07/24/23: 12 degrees right hand, 15 degrees left hand, decreased, likely 2/2 increased fatigue and weightbearing through hands in earlier therapy today     3. Pt will demonstrate management of items down/up over his hips to include STS transfer with SBA and min verbal cues only to promote increased safety and I in toileting tasks. Eval status: Pt requires assist from spouse for task  06/26/23: Renell Bark for management of items and to maintain standing balance; goal progressing  07/24/23: CGA for safety and balance during standing with verbal cues for technique to shilo item over right leg first and to stand; goal progressing     4. Pt and spouse will demonstrate I in carryover of HEP program as demonstrated by teach-back and caregiver report to increase pt strength and activity tolerance for ADLs in a progressive manner. Eval status: No HEP yet  06/26/23: Pt completes at home with wife sometimes; goal progressing  07/24/23: Pt reports he doesn't like to do his exercises, does them sometimes; goal ongoing  ___________________________________________________________     Long Term Goals: To be accomplished in 6 weeks:                 1. Pt will demonstrate improved gross  strength by 15 lbs on each hand to promote increased safety and I in functional mobility and ADL self care tasks. Eval status: 52.18 lbs R hand, 48.5 lbs L hand **  **erroneously documented Kgs as Lbs on eval; unit conversion has been corrected in this progress notes. 06/26/23: 45 lbs R hand, 58 lbs L hand, goal progressing  07/24/23: R hand 53.3, L hand 62, goal progressing     2.  Pt will demonstrate management of items down/up over his

## 2023-08-07 NOTE — PROGRESS NOTES
PHYSICAL / OCCUPATIONAL THERAPY - DAILY TREATMENT NOTE (updated )    Patient Name: Syed Curry    Date: 2023    : 1940  Insurance: Payor: MEDICARE / Plan: MEDICARE PART A AND B / Product Type: *No Product type* /      Patient  verified Yes     Visit #   Current / Total 16 24   Time   In / Out 310 350   Pain   In / Out 3/10 3/10   Subjective Functional Status/Changes: Pt reported that he was really tired   Changes to: Allergies, Med Hx, Sx Hx?   no       TREATMENT AREA =  Other abnormalities of gait and mobility [R26.89]    OBJECTIVE    Therapeutic Procedures: Tx Min Billable or 1:1 Min (if diff from Tx Min) Procedure, Rationale, Specifics   15 15 [id number] Neuromuscular Re-Education (timed):  improve balance, coordination, kinesthetic sense, posture, core stability and proprioception to improve patient's ability to develop conscious control of individual muscles and awareness of position of extremities in order to progress to PLOF and address remaining functional goals. (see flow sheet as applicable)     Details if applicable:     10 10 98493 Therapeutic Activity (timed):  use of dynamic activities replicating functional movements to increase ROM, strength, coordination, balance, and proprioception in order to improve patient's ability to progress to PLOF and address remaining functional goals. (see flow sheet as applicable)     Details if applicable:     15 15 80207 Gait Training (timed):    120 feet with RW (assistive device) over even surfaces with CGA level of assist. Cuing for walker management. To improve safety and dynamic movement with household/community ambulation.   (see flow sheet as applicable)     Details if applicable:     40 40 [address] Reminder: bill using total billable min of TIMED therapeutic procedures (example: do not include dry needle or estim unattended, both untimed codes, in totals to left)  8-22 min = 1 unit; 23-37 min = 2 units; 38-52 min = 3 units; 53-67 min =

## 2023-08-07 NOTE — PROGRESS NOTES
[facility] at [address] 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 Pt was able to mange toileting with mod ind. Visits from Start of Care: 16    Missed Visits: 0    Updated Goals/Measure of Progress: To be achieved in 24 treatments:    Short Term Goals: To be accomplished in 8 treatments:  Patient will report compliance with HEP at least 1x/day to aid in rehabilitation program.  Status at IE: given STS x 10   PN 7/10/23: Pt reports continued lack of compliance of HEP with at this time 7/10/23 No Change  23 PN: Pt continues to have decreased compliance with HEP     Patient will decrease TUG by 20 seconds to display MCID and progression to decreased falls risk. Status at IE: 1 min 47 sec   PN 7/10/23: 1 min 10 sec with RW 7/10/23 MET     Patient will be able to complete 5 x STS to display MCID and progression to decreased falls risk and improved pts ind in household mobility. Status at IE: unable   PN 7/10/23:  STS x 5: 45 sec with Ron UE support and SBA 7/10/23 Progressing   23 PN: STS x 5 32 sec with ron UE support MET      Long Term Goals: To be accomplished in 24 treatments:  Patient will increase strength to 5/5 throughout Ron LEs to aid in return recreational activities and ADLs.   Status at IE:( '*' indicates tested in sitting)  Hip Left Right Left 7/10/23 Right 7/10/23   Flexion 4

## 2023-08-11 ENCOUNTER — HOSPITAL ENCOUNTER (OUTPATIENT)
Facility: HOSPITAL | Age: 83
Setting detail: RECURRING SERIES
Discharge: HOME | End: 2023-08-14
Payer: MEDICARE

## 2023-08-11 PROCEDURE — 97530 THERAPEUTIC ACTIVITIES: CPT

## 2023-08-11 PROCEDURE — 97110 THERAPEUTIC EXERCISES: CPT

## 2023-08-11 PROCEDURE — 97112 NEUROMUSCULAR REEDUCATION: CPT

## 2023-08-11 NOTE — PROGRESS NOTES
PHYSICAL / OCCUPATIONAL THERAPY - DAILY TREATMENT NOTE (updated )    Patient Name: Sarah Other    Date: 2023    : 1940  Insurance: Payor: MEDICARE / Plan: MEDICARE PART A AND B / Product Type: *No Product type* /      Patient  verified Yes     Visit #   Current / Total 11 12   Time   In / Out 1130 1210   Pain   In / Out 0 0   Subjective Functional Status/Changes: Pt wife reports he is scheduled to get looked at for  a UTI today, has increased trouble walking   Changes to: Allergies, Med Hx, Sx Hx?   no       TREATMENT AREA =  Muscle weakness (generalized) [M62.81]    OBJECTIVE    Therapeutic Procedures: Tx Min Billable or 1:1 Min (if diff from Tx Min) Procedure, Rationale, Specifics   10  83594 Therapeutic Exercise (timed):  increase ROM, strength, coordination, balance, and proprioception to improve patient's ability to progress to PLOF and address remaining functional goals. (see flow sheet as applicable)     Details if applicable:       15  70482 Therapeutic Activity (timed):  use of dynamic activities replicating functional movements to increase ROM, strength, coordination, balance, and proprioception in order to improve patient's ability to progress to PLOF and address remaining functional goals. (see flow sheet as applicable)     Details if applicable:     15  35605 Neuromuscular Re-Education (timed):  improve balance, coordination, kinesthetic sense, posture, core stability and proprioception to improve patient's ability to develop conscious control of individual muscles and awareness of position of extremities in order to progress to PLOF and address remaining functional goals.  (see flow sheet as applicable)     Details if applicable:               36  [facility] Totals Reminder: bill using total billable min of TIMED therapeutic procedures (example: do not include dry needle or estim unattended, both untimed codes, in totals to left)  8-22 min = 1 unit; 23-37 min = 2 units; 38-52 min

## 2023-08-11 NOTE — PROGRESS NOTES
PHYSICAL / OCCUPATIONAL THERAPY - DAILY TREATMENT NOTE (updated )    Patient Name: Luzma Evans    Date: 2023    : 1940  Insurance: Payor: MEDICARE / Plan: MEDICARE PART A AND B / Product Type: *No Product type* /      Patient  verified Yes     Visit #   Current / Total 17 24   Time   In / Out 1230 110   Pain   In / Out 0 0   Subjective Functional Status/Changes: Pt reported that he is going to the MD today after treatment for assessment   Changes to: Allergies, Med Hx, Sx Hx?   no       TREATMENT AREA =  Other abnormalities of gait and mobility [R26.89]    OBJECTIVE    Therapeutic Procedures: Tx Min Billable or 1:1 Min (if diff from Tx Min) Procedure, Rationale, Specifics   15 15 75155 Therapeutic Exercise (timed):  increase ROM, strength, coordination, balance, and proprioception to improve patient's ability to progress to PLOF and address remaining functional goals. (see flow sheet as applicable)     Details if applicable:       15 15 05335 Neuromuscular Re-Education (timed):  improve balance, coordination, kinesthetic sense, posture, core stability and proprioception to improve patient's ability to develop conscious control of individual muscles and awareness of position of extremities in order to progress to PLOF and address remaining functional goals. (see flow sheet as applicable)     Details if applicable:     10 10 89273 Therapeutic Activity (timed):  use of dynamic activities replicating functional movements to increase ROM, strength, coordination, balance, and proprioception in order to improve patient's ability to progress to PLOF and address remaining functional goals.   (see flow sheet as applicable)     Details if applicable:     40 36 [address] Reminder: bill using total billable min of TIMED therapeutic procedures (example: do not include dry needle or estim unattended, both untimed codes, in totals to left)  8-22 min = 1 unit; 23-37 min = 2 units; 38-52 min = 3 units;

## 2023-08-14 ENCOUNTER — HOSPITAL ENCOUNTER (OUTPATIENT)
Facility: HOSPITAL | Age: 83
Setting detail: RECURRING SERIES
Discharge: HOME | End: 2023-08-17
Payer: MEDICARE

## 2023-08-14 PROCEDURE — 97530 THERAPEUTIC ACTIVITIES: CPT

## 2023-08-14 PROCEDURE — 97112 NEUROMUSCULAR REEDUCATION: CPT

## 2023-08-14 PROCEDURE — 97110 THERAPEUTIC EXERCISES: CPT

## 2023-08-14 NOTE — PROGRESS NOTES
PHYSICAL / OCCUPATIONAL THERAPY - DAILY TREATMENT NOTE (updated )    Patient Name: Rojas José    Date: 2023    : 1940  Insurance: Payor: MEDICARE / Plan: MEDICARE PART A AND B / Product Type: *No Product type* /      Patient  verified Yes     Visit #   Current / Total 18 24   Time   In / Out 310 354   Pain   In / Out 0 0   Subjective Functional Status/Changes: Pts wife reported that pt did have a UTI and is now on antibiotics    Changes to: Allergies, Med Hx, Sx Hx? YES see above        TREATMENT AREA =  Other abnormalities of gait and mobility [R26.89]    OBJECTIVE    Therapeutic Procedures: Tx Min Billable or 1:1 Min (if diff from Tx Min) Procedure, Rationale, Specifics   15 10 86543 Therapeutic Exercise (timed):  increase ROM, strength, coordination, balance, and proprioception to improve patient's ability to progress to PLOF and address remaining functional goals. (see flow sheet as applicable)     Details if applicable:       15 15 09960 Neuromuscular Re-Education (timed):  improve balance, coordination, kinesthetic sense, posture, core stability and proprioception to improve patient's ability to develop conscious control of individual muscles and awareness of position of extremities in order to progress to PLOF and address remaining functional goals. (see flow sheet as applicable)     Details if applicable:      95037 Therapeutic Activity (timed):  use of dynamic activities replicating functional movements to increase ROM, strength, coordination, balance, and proprioception in order to improve patient's ability to progress to PLOF and address remaining functional goals.   (see flow sheet as applicable)     Details if applicable:     40 39 [facility] Totals Reminder: bill using total billable min of TIMED therapeutic procedures (example: do not include dry needle or estim unattended, both untimed codes, in totals to left)  8-22 min = 1 unit; 23-37 min = 2 units; 38-52 min = 3 units;

## 2023-08-14 NOTE — PROGRESS NOTES
PHYSICAL / OCCUPATIONAL THERAPY - DAILY TREATMENT NOTE (updated )    Patient Name: Phill Mosquera    Date: 2023    : 1940  Insurance: Payor: MEDICARE / Plan: MEDICARE PART A AND B / Product Type: *No Product type* /      Patient  verified Yes     Visit #   Current / Total 12 12   Time   In / Out 230 310   Pain   In / Out 0 0   Subjective Functional Status/Changes: Pt states \" its always a pain to be here\"    Changes to: Allergies, Med Hx, Sx Hx?   no       TREATMENT AREA =  Muscle weakness (generalized) [M62.81]    OBJECTIVE    Therapeutic Procedures: Tx Min Billable or 1:1 Min (if diff from Tx Min) Procedure, Rationale, Specifics   15  17609 Therapeutic Exercise (timed):  increase ROM, strength, coordination, balance, and proprioception to improve patient's ability to progress to PLOF and address remaining functional goals. (see flow sheet as applicable)     Details if applicable:       25  60365 Therapeutic Activity (timed):  use of dynamic activities replicating functional movements to increase ROM, strength, coordination, balance, and proprioception in order to improve patient's ability to progress to PLOF and address remaining functional goals.   (see flow sheet as applicable)     Details if applicable:                    36  [facility] Totals Reminder: bill using total billable min of TIMED therapeutic procedures (example: do not include dry needle or estim unattended, both untimed codes, in totals to left)  8-22 min = 1 unit; 23-37 min = 2 units; 38-52 min = 3 units; 53-67 min = 4 units; 68-82 min = 5 units   Total Total     TOTAL TREATMENT TIME:        40     [x]  Patient Education billed concurrently with other procedures   [x] Review HEP    [] Progressed/Changed HEP, detail:    [] Other detail:       Objective Information/Functional Measures/Assessment    Pt has been through course of 12 OT visits to address functional deficits in  strength, fine motor control, standing
today   8/14/23: 10 degrees right hand,   14 degrees left hand, no change d/c goal   3. Pt will demonstrate management of items down/up over his hips to include STS transfer with SBA and min verbal cues only to promote increased safety and I in toileting tasks. Eval status: Pt requires assist from spouse for task  06/26/23: Ovidio Mohamud for management of items and to maintain standing balance; goal progressing  07/24/23: CGA for safety and balance during standing with verbal cues for technique to shilo item over right leg first and to stand; goal progressing   8/14/23: min to mod A for safety and balance to perform sit to stand. 4. Pt and spouse will demonstrate I in carryover of HEP program as demonstrated by teach-back and caregiver report to increase pt strength and activity tolerance for ADLs in a progressive manner. Eval status: No HEP yet  06/26/23: Pt completes at home with wife sometimes; goal progressing  07/24/23: Pt reports he doesn't like to do his exercises, does them sometimes; goal ongoing  8/14/23: d/c goal   ___________________________________________________________     Long Term Goals: To be accomplished in 6 weeks:                 1. Pt will demonstrate improved gross  strength by 15 lbs on each hand to promote increased safety and I in functional mobility and ADL self care tasks. Eval status: 52.18 lbs R hand, 48.5 lbs L hand **  **erroneously documented Kgs as Lbs on eval; unit conversion has been corrected in this progress notes. 06/26/23: 45 lbs R hand, 58 lbs L hand, goal progressing  07/24/23: R hand 53.3, L hand 62, goal progressing  8/14/23: 59.4 right hand    58.8 left hand minimal progress d/c goal      2. Pt will demonstrate management of items down/up over his hips to include STS transfer with SBA and min verbal cues only to promote increased safety and I in toileting tasks. Eval status:  Mod A  06/26/23: Min A, goal progressing  07/24/23: CGA for safety and balance during

## 2023-08-18 ENCOUNTER — HOSPITAL ENCOUNTER (OUTPATIENT)
Facility: HOSPITAL | Age: 83
Setting detail: RECURRING SERIES
Discharge: HOME | End: 2023-08-21
Payer: MEDICARE

## 2023-08-18 ENCOUNTER — APPOINTMENT (OUTPATIENT)
Facility: HOSPITAL | Age: 83
End: 2023-08-18
Payer: MEDICARE

## 2023-08-18 PROCEDURE — 97110 THERAPEUTIC EXERCISES: CPT

## 2023-08-18 PROCEDURE — 97112 NEUROMUSCULAR REEDUCATION: CPT

## 2023-08-18 PROCEDURE — 97530 THERAPEUTIC ACTIVITIES: CPT

## 2023-08-18 NOTE — PROGRESS NOTES
PHYSICAL / OCCUPATIONAL THERAPY - DAILY TREATMENT NOTE (updated )    Patient Name: Luzma Evans    Date: 2023    : 1940  Insurance: Payor: MEDICARE / Plan: MEDICARE PART A AND B / Product Type: *No Product type* /      Patient  verified Yes     Visit #   Current / Total 19 24   Time   In / Out 12:30 1:10   Pain   In / Out 0/10 0/10   Subjective Functional Status/Changes: \"I don't have any pain right now. \"   Changes to: Allergies, Med Hx, Sx Hx?   no       TREATMENT AREA =  Other abnormalities of gait and mobility [R26.89]    OBJECTIVE    Therapeutic Procedures: Tx Min Billable or 1:1 Min (if diff from Tx Min) Procedure, Rationale, Specifics   15 15 26041 Therapeutic Exercise (timed):  increase ROM, strength, coordination, balance, and proprioception to improve patient's ability to progress to PLOF and address remaining functional goals. (see flow sheet as applicable)     Details if applicable:       15 15 45134 Gait Training (timed):    ~180 feet with RW (assistive device) over Level surfaces with SBA level of assist. Cuing for dorsiflexion to clear toes. To improve safety and dynamic movement with household/community ambulation. (see flow sheet as applicable)     Details if applicable:     10 10 33262 Therapeutic Activity (timed):  use of dynamic activities replicating functional movements to increase ROM, strength, coordination, balance, and proprioception in order to improve patient's ability to progress to PLOF and address remaining functional goals. (see flow sheet as applicable)     Details if applicable:       85520 Self Care/Home Management (timed):  improve patient knowledge and understanding of pain reducing techniques, positioning, and posture/ergonomics  to improve patient's ability to progress to PLOF and address remaining functional goals.   (see flow sheet as applicable)     Details if applicable:            Details if applicable:     40 36 [address] Reminder: bill using

## 2023-08-21 ENCOUNTER — HOSPITAL ENCOUNTER (OUTPATIENT)
Facility: HOSPITAL | Age: 83
Setting detail: RECURRING SERIES
Discharge: HOME | End: 2023-08-24
Payer: MEDICARE

## 2023-08-21 ENCOUNTER — APPOINTMENT (OUTPATIENT)
Facility: HOSPITAL | Age: 83
End: 2023-08-21
Payer: MEDICARE

## 2023-08-21 PROCEDURE — 97530 THERAPEUTIC ACTIVITIES: CPT

## 2023-08-21 PROCEDURE — 97110 THERAPEUTIC EXERCISES: CPT

## 2023-08-21 NOTE — PROGRESS NOTES
PHYSICAL / OCCUPATIONAL THERAPY - DAILY TREATMENT NOTE (updated )    Patient Name: Noelle Victoria    Date: 2023    : 1940  Insurance: Payor: MEDICARE / Plan: MEDICARE PART A AND B / Product Type: *No Product type* /      Patient  verified Yes     Visit #   Current / Total 20 24   Time   In / Out 3:10 3:50   Pain   In / Out 0/10 0/10   Subjective Functional Status/Changes: \"I'm tired. \"   Changes to: Allergies, Med Hx, Sx Hx?   no       TREATMENT AREA =  Other abnormalities of gait and mobility [R26.89]    OBJECTIVE    Therapeutic Procedures: Tx Min Billable or 1:1 Min (if diff from Tx Min) Procedure, Rationale, Specifics   30 30 24455 Therapeutic Exercise (timed):  increase ROM, strength, coordination, balance, and proprioception to improve patient's ability to progress to PLOF and address remaining functional goals. (see flow sheet as applicable)     Details if applicable:         60188 Neuromuscular Re-Education (timed):  improve balance, coordination, kinesthetic sense, posture, core stability and proprioception to improve patient's ability to develop conscious control of individual muscles and awareness of position of extremities in order to progress to PLOF and address remaining functional goals. (see flow sheet as applicable)     Details if applicable:     10 10 54982 Therapeutic Activity (timed):  use of dynamic activities replicating functional movements to increase ROM, strength, coordination, balance, and proprioception in order to improve patient's ability to progress to PLOF and address remaining functional goals. (see flow sheet as applicable)     Details if applicable:       03720 Self Care/Home Management (timed):  improve patient knowledge and understanding of pain reducing techniques, positioning, and posture/ergonomics  to improve patient's ability to progress to PLOF and address remaining functional goals.   (see flow sheet as applicable)     Details if applicable:
7

## 2023-08-25 ENCOUNTER — HOSPITAL ENCOUNTER (OUTPATIENT)
Facility: HOSPITAL | Age: 83
Setting detail: RECURRING SERIES
Discharge: HOME | End: 2023-08-28
Payer: MEDICARE

## 2023-08-25 PROCEDURE — 97110 THERAPEUTIC EXERCISES: CPT

## 2023-08-25 PROCEDURE — 97535 SELF CARE MNGMENT TRAINING: CPT

## 2023-08-25 PROCEDURE — 97112 NEUROMUSCULAR REEDUCATION: CPT

## 2023-08-25 PROCEDURE — 97530 THERAPEUTIC ACTIVITIES: CPT

## 2023-08-25 NOTE — PROGRESS NOTES
PHYSICAL / OCCUPATIONAL THERAPY - DAILY TREATMENT NOTE (updated )    Patient Name: Phill Mosquera    Date: 2023    : 1940  Insurance: Payor: MEDICARE / Plan: MEDICARE PART A AND B / Product Type: *No Product type* /      Patient  verified Yes     Visit #   Current / Total 21 24   Time   In / Out 1:10 2:05   Pain   In / Out 0/10 0/10   Subjective Functional Status/Changes: \"I don't have any pain. \"   Changes to: Allergies, Med Hx, Sx Hx?   no       TREATMENT AREA =  Other abnormalities of gait and mobility [R26.89]    OBJECTIVE  Therapeutic Procedures: Tx Min Billable or 1:1 Min (if diff from Tx Min) Procedure, Rationale, Specifics   10 10 31108 Therapeutic Exercise (timed):  increase ROM, strength, coordination, balance, and proprioception to improve patient's ability to progress to PLOF and address remaining functional goals. (see flow sheet as applicable)     Details if applicable:        97003 Neuromuscular Re-Education (timed):  improve balance, coordination, kinesthetic sense, posture, core stability and proprioception to improve patient's ability to develop conscious control of individual muscles and awareness of position of extremities in order to progress to PLOF and address remaining functional goals. (see flow sheet as applicable)     Details if applicable:  Gait mechanics and walker stability training   15 15 27862 Therapeutic Activity (timed):  use of dynamic activities replicating functional movements to increase ROM, strength, coordination, balance, and proprioception in order to improve patient's ability to progress to PLOF and address remaining functional goals. (see flow sheet as applicable)     Details if applicable:     10 10 87556 Self Care/Home Management (timed):  improve patient knowledge and understanding of pain reducing techniques, positioning, and posture/ergonomics  to improve patient's ability to progress to PLOF and address remaining functional goals.   (see

## 2023-08-28 ENCOUNTER — APPOINTMENT (OUTPATIENT)
Facility: HOSPITAL | Age: 83
End: 2023-08-28
Payer: MEDICARE

## 2023-08-28 ENCOUNTER — HOSPITAL ENCOUNTER (OUTPATIENT)
Facility: HOSPITAL | Age: 83
Setting detail: RECURRING SERIES
Discharge: HOME | End: 2023-08-31
Payer: MEDICARE

## 2023-08-28 PROCEDURE — 97110 THERAPEUTIC EXERCISES: CPT

## 2023-08-28 PROCEDURE — 97112 NEUROMUSCULAR REEDUCATION: CPT

## 2023-08-28 PROCEDURE — 97530 THERAPEUTIC ACTIVITIES: CPT

## 2023-08-28 NOTE — PROGRESS NOTES
PHYSICAL / OCCUPATIONAL THERAPY - DAILY TREATMENT NOTE (updated )    Patient Name: Edmund Bryan    Date: 2023    : 1940  Insurance: Payor: MEDICARE / Plan: MEDICARE PART A AND B / Product Type: *No Product type* /      Patient  verified Yes     Visit #   Current / Total 22 24   Time   In / Out 3:10 3:50   Pain   In / Out 0/10 010   Subjective Functional Status/Changes: \" I walked 30 ft both Saturday and . \"   Changes to: Allergies, Med Hx, Sx Hx?   no       TREATMENT AREA =  Other abnormalities of gait and mobility [R26.89]    OBJECTIVE    Therapeutic Procedures: Tx Min Billable or 1:1 Min (if diff from Tx Min) Procedure, Rationale, Specifics   20  69422 Therapeutic Exercise (timed):  increase ROM, strength, coordination, balance, and proprioception to improve patient's ability to progress to PLOF and address remaining functional goals. (see flow sheet as applicable)     Details if applicable:       10 10 47187 Neuromuscular Re-Education (timed):  improve balance, coordination, kinesthetic sense, posture, core stability and proprioception to improve patient's ability to develop conscious control of individual muscles and awareness of position of extremities in order to progress to PLOF and address remaining functional goals. (see flow sheet as applicable)     Details if applicable:     10 10 57808 Therapeutic Activity (timed):  use of dynamic activities replicating functional movements to increase ROM, strength, coordination, balance, and proprioception in order to improve patient's ability to progress to PLOF and address remaining functional goals. (see flow sheet as applicable)     Details if applicable:       66515 Self Care/Home Management (timed):  improve patient knowledge and understanding of pain reducing techniques, positioning, and posture/ergonomics  to improve patient's ability to progress to PLOF and address remaining functional goals.   (see flow sheet as applicable)

## 2023-09-01 ENCOUNTER — HOSPITAL ENCOUNTER (OUTPATIENT)
Facility: HOSPITAL | Age: 83
Setting detail: RECURRING SERIES
Discharge: HOME | End: 2023-09-04
Payer: MEDICARE

## 2023-09-01 ENCOUNTER — APPOINTMENT (OUTPATIENT)
Facility: HOSPITAL | Age: 83
End: 2023-09-01
Payer: MEDICARE

## 2023-09-01 PROCEDURE — 97530 THERAPEUTIC ACTIVITIES: CPT

## 2023-09-01 PROCEDURE — 97110 THERAPEUTIC EXERCISES: CPT

## 2023-09-01 PROCEDURE — 97112 NEUROMUSCULAR REEDUCATION: CPT

## 2023-09-01 PROCEDURE — 97535 SELF CARE MNGMENT TRAINING: CPT

## 2023-09-05 NOTE — PROGRESS NOTES
[facility] at [address] 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 Pt was able to mange toileting with mod ind. Visits from Start of Care: 23    Missed Visits: 1    Reporting Period : 23 to 23    Goals/Measure of Progress:  Short Term Goals: To be accomplished in 8 treatments:  Patient will report compliance with HEP at least 1x/day to aid in rehabilitation program.  Status at IE: given STS x 10   PN 7/10/23: Pt reports continued lack of compliance of HEP with at this time 7/10/23 No Change  23 PN: Pt continues to have decreased compliance with HEP  Current: Pt reports daily ambulation of 30+ ft as part of HEP 23 Progressing     Patient will decrease TUG by 20 seconds to display MCID and progression to decreased falls risk. Status at IE: 1 min 47 sec   PN 7/10/23: 1 min 10 sec with RW 7/10/23 MET     Patient will be able to complete 5 x STS to display MCID and progression to decreased falls risk and improved pts ind in household mobility. Status at IE: unable   PN 7/10/23:  STS x 5: 45 sec with Ron UE support and SBA 7/10/23 Progressing   23 PN: STS x 5 32 sec with ron UE support MET      Long Term Goals: To be accomplished in 24 treatments:  Patient will increase strength to 5/5 throughout Ron LEs to aid in return recreational activities and ADLs.   Status at IE:( '*'
decreased Angel knee extension  Right> Left  PN 7/10/23:  pt able to amb 208 ft with RW with Left foot clearance, right foot drop, improved Left Knee extension but, decreased Right Knee knee flexion 7/10/23 Progressing  8/7/23 PN: 120 feet with RW and constant verbal instruction for right foot clearance. regression   8/14/23: ambulated 120 feet with min cues for right foot clearance using RW   Current:  ~250 ft with RW with minor VC for posture, with SBA and step through gait mechancis but, lack of Right foot clearance 9/1/23 Progressing    Patient will decrease TUG by 40 seconds to display MCID and progression to decreased falls risk. Status at IE: 1 min 47 sec   PN 7/10/23: 1 min 10 sec with RW 7/10/23 Progressing  8/7/23: 1 min 48 sec REGRESSION   8/11/23: 1 min 32 sec progressing   Current: 1 min 23 sec Progressing 9/1/23    Patient will be able to complete 5 x STS in 20 seconds to display MCID and progression to decreased falls risk and improved pts ind in household mobility. Status at IE: unable   PN 7/10/23: Performed 5 x STS with angel UE support in 1 minute 9 seconds with CGA   8/7/23 PN: 54 sec with min A and Angel UE support REGRESSION   8/11/23: 30 sec with min A and Angel UE support Progressing    Current: 45 sec with SBA and angel UE support Progression with assist level improvement 9/1/23     6. Patient will improve FOTO to 11 points overall to demonstrate improvement in functional ability.   Status at IE:34  7/10/23 PN 50 MET  FOTO score = an established functional score where 100 = no disability    PLAN  Yes  Continue plan of care  []  Upgrade activities as tolerated  []  Discharge due to :  []  Other:    Antolin Ojeda PTA    9/1/2023    9:06 AM    Future Appointments   Date Time Provider [address]   9/1/2023 12:30 PM Antolin Ojeda PTA [facility]

## 2023-11-26 ENCOUNTER — HOSPITAL ENCOUNTER (INPATIENT)
Facility: HOSPITAL | Age: 83
LOS: 3 days | Discharge: NURSING, REHAB OR LONG-TERM CARE | DRG: 194 | End: 2023-11-29
Attending: EMERGENCY MEDICINE | Admitting: FAMILY MEDICINE
Payer: MEDICARE

## 2023-11-26 ENCOUNTER — APPOINTMENT (OUTPATIENT)
Facility: HOSPITAL | Age: 83
DRG: 194 | End: 2023-11-26
Payer: MEDICARE

## 2023-11-26 DIAGNOSIS — J18.9 PNEUMONIA OF RIGHT LUNG DUE TO INFECTIOUS ORGANISM, UNSPECIFIED PART OF LUNG: ICD-10-CM

## 2023-11-26 DIAGNOSIS — R06.00 DYSPNEA, UNSPECIFIED TYPE: Primary | ICD-10-CM

## 2023-11-26 DIAGNOSIS — R06.2 WHEEZING: ICD-10-CM

## 2023-11-26 PROBLEM — I10 HYPERTENSION: Status: ACTIVE | Noted: 2023-01-31

## 2023-11-26 PROBLEM — M50.30 DDD (DEGENERATIVE DISC DISEASE), CERVICAL: Status: ACTIVE | Noted: 2017-03-02

## 2023-11-26 PROBLEM — F03.90 DEMENTIA (HCC): Status: ACTIVE | Noted: 2023-01-31

## 2023-11-26 PROBLEM — J16.0 CAP (COMMUNITY ACQUIRED PNEUMONIA) DUE TO CHLAMYDIA SPECIES: Status: ACTIVE | Noted: 2023-11-26

## 2023-11-26 PROBLEM — J45.901 MODERATE ASTHMA WITH ACUTE EXACERBATION: Status: ACTIVE | Noted: 2023-11-26

## 2023-11-26 PROBLEM — J16.0 COMMUNITY ACQUIRED PNEUMONIA DUE TO CHLAMYDIA SPECIES: Status: ACTIVE | Noted: 2023-11-26

## 2023-11-26 PROBLEM — R13.10 DYSPHAGIA: Status: ACTIVE | Noted: 2023-02-03

## 2023-11-26 PROBLEM — E03.9 ACQUIRED HYPOTHYROIDISM: Status: ACTIVE | Noted: 2023-01-31

## 2023-11-26 LAB
ALBUMIN SERPL-MCNC: 3 G/DL (ref 3.4–5)
ALBUMIN/GLOB SERPL: 0.7 (ref 0.8–1.7)
ALP SERPL-CCNC: 158 U/L (ref 45–117)
ALT SERPL-CCNC: 91 U/L (ref 16–61)
ANION GAP BLD CALC-SCNC: 9.7 MMOL/L (ref 10–20)
ANION GAP SERPL CALC-SCNC: 4 MMOL/L (ref 3–18)
AST SERPL-CCNC: 70 U/L (ref 10–38)
BASOPHILS # BLD: 0 K/UL (ref 0–0.1)
BASOPHILS NFR BLD: 0 % (ref 0–2)
BILIRUB SERPL-MCNC: 0.2 MG/DL (ref 0.2–1)
BUN SERPL-MCNC: 24 MG/DL (ref 7–18)
BUN/CREAT SERPL: 22 (ref 12–20)
CA-I BLD-MCNC: 1.19 MMOL/L (ref 1.12–1.32)
CALCIUM SERPL-MCNC: 8.9 MG/DL (ref 8.5–10.1)
CHLORIDE BLD-SCNC: 108 MMOL/L (ref 98–107)
CHLORIDE SERPL-SCNC: 114 MMOL/L (ref 100–111)
CO2 BLD-SCNC: 32.3 MMOL/L (ref 21–32)
CO2 SERPL-SCNC: 29 MMOL/L (ref 21–32)
CREAT BLD-MCNC: 0.71 MG/DL (ref 0.6–1.3)
CREAT SERPL-MCNC: 1.1 MG/DL (ref 0.6–1.3)
DIFFERENTIAL METHOD BLD: ABNORMAL
EKG ATRIAL RATE: 73 BPM
EKG DIAGNOSIS: NORMAL
EKG P AXIS: 33 DEGREES
EKG P-R INTERVAL: 120 MS
EKG Q-T INTERVAL: 408 MS
EKG QRS DURATION: 84 MS
EKG QTC CALCULATION (BAZETT): 449 MS
EKG R AXIS: 18 DEGREES
EKG T AXIS: 10 DEGREES
EKG VENTRICULAR RATE: 73 BPM
EOSINOPHIL # BLD: 0 K/UL (ref 0–0.4)
EOSINOPHIL NFR BLD: 0 % (ref 0–5)
ERYTHROCYTE [DISTWIDTH] IN BLOOD BY AUTOMATED COUNT: 13.3 % (ref 11.6–14.5)
GLOBULIN SER CALC-MCNC: 4.1 G/DL (ref 2–4)
GLUCOSE BLD-MCNC: 85 MG/DL (ref 65–100)
GLUCOSE SERPL-MCNC: 163 MG/DL (ref 74–99)
HCT VFR BLD AUTO: 46.1 % (ref 36–48)
HGB BLD-MCNC: 14.9 G/DL (ref 13–16)
IMM GRANULOCYTES # BLD AUTO: 0.1 K/UL (ref 0–0.04)
IMM GRANULOCYTES NFR BLD AUTO: 1 % (ref 0–0.5)
LACTATE BLD-SCNC: 1.26 MMOL/L (ref 0.4–2)
LACTATE BLD-SCNC: 2.19 MMOL/L (ref 0.4–2)
LYMPHOCYTES # BLD: 1.4 K/UL (ref 0.9–3.6)
LYMPHOCYTES NFR BLD: 20 % (ref 21–52)
MAGNESIUM SERPL-MCNC: 2.1 MG/DL (ref 1.6–2.6)
MCH RBC QN AUTO: 30.4 PG (ref 24–34)
MCHC RBC AUTO-ENTMCNC: 32.3 G/DL (ref 31–37)
MCV RBC AUTO: 94.1 FL (ref 78–100)
MONOCYTES # BLD: 0.7 K/UL (ref 0.05–1.2)
MONOCYTES NFR BLD: 11 % (ref 3–10)
NEUTS SEG # BLD: 4.6 K/UL (ref 1.8–8)
NEUTS SEG NFR BLD: 68 % (ref 40–73)
NRBC # BLD: 0 K/UL (ref 0–0.01)
NRBC BLD-RTO: 0 PER 100 WBC
NT PRO BNP: 148 PG/ML (ref 0–1800)
PLATELET # BLD AUTO: 150 K/UL (ref 135–420)
PMV BLD AUTO: 9.7 FL (ref 9.2–11.8)
POTASSIUM BLD-SCNC: 3 MMOL/L (ref 3.5–5.1)
POTASSIUM SERPL-SCNC: 3.4 MMOL/L (ref 3.5–5.5)
PROT SERPL-MCNC: 7.1 G/DL (ref 6.4–8.2)
RBC # BLD AUTO: 4.9 M/UL (ref 4.35–5.65)
SERVICE CMNT-IMP: ABNORMAL
SODIUM BLD-SCNC: 150 MMOL/L (ref 136–145)
SODIUM SERPL-SCNC: 147 MMOL/L (ref 136–145)
SPECIMEN SITE: ABNORMAL
TROPONIN I SERPL HS-MCNC: 7 NG/L (ref 0–78)
WBC # BLD AUTO: 6.8 K/UL (ref 4.6–13.2)

## 2023-11-26 PROCEDURE — 93010 ELECTROCARDIOGRAM REPORT: CPT | Performed by: INTERNAL MEDICINE

## 2023-11-26 PROCEDURE — 71250 CT THORAX DX C-: CPT

## 2023-11-26 PROCEDURE — 83735 ASSAY OF MAGNESIUM: CPT

## 2023-11-26 PROCEDURE — 84484 ASSAY OF TROPONIN QUANT: CPT

## 2023-11-26 PROCEDURE — 6360000002 HC RX W HCPCS: Performed by: EMERGENCY MEDICINE

## 2023-11-26 PROCEDURE — 1100000003 HC PRIVATE W/ TELEMETRY

## 2023-11-26 PROCEDURE — 6360000002 HC RX W HCPCS: Performed by: FAMILY MEDICINE

## 2023-11-26 PROCEDURE — 80047 BASIC METABLC PNL IONIZED CA: CPT

## 2023-11-26 PROCEDURE — 6370000000 HC RX 637 (ALT 250 FOR IP): Performed by: FAMILY MEDICINE

## 2023-11-26 PROCEDURE — 83605 ASSAY OF LACTIC ACID: CPT

## 2023-11-26 PROCEDURE — 96374 THER/PROPH/DIAG INJ IV PUSH: CPT

## 2023-11-26 PROCEDURE — 85025 COMPLETE CBC W/AUTO DIFF WBC: CPT

## 2023-11-26 PROCEDURE — 2580000003 HC RX 258: Performed by: EMERGENCY MEDICINE

## 2023-11-26 PROCEDURE — 93005 ELECTROCARDIOGRAM TRACING: CPT | Performed by: EMERGENCY MEDICINE

## 2023-11-26 PROCEDURE — 96375 TX/PRO/DX INJ NEW DRUG ADDON: CPT

## 2023-11-26 PROCEDURE — 6370000000 HC RX 637 (ALT 250 FOR IP): Performed by: EMERGENCY MEDICINE

## 2023-11-26 PROCEDURE — 71045 X-RAY EXAM CHEST 1 VIEW: CPT

## 2023-11-26 PROCEDURE — 2580000003 HC RX 258: Performed by: FAMILY MEDICINE

## 2023-11-26 PROCEDURE — 99285 EMERGENCY DEPT VISIT HI MDM: CPT

## 2023-11-26 PROCEDURE — 83880 ASSAY OF NATRIURETIC PEPTIDE: CPT

## 2023-11-26 PROCEDURE — 80053 COMPREHEN METABOLIC PANEL: CPT

## 2023-11-26 RX ORDER — ALBUTEROL SULFATE 2.5 MG/3ML
2.5 SOLUTION RESPIRATORY (INHALATION)
Status: COMPLETED | OUTPATIENT
Start: 2023-11-26 | End: 2023-11-26

## 2023-11-26 RX ORDER — MEMANTINE HYDROCHLORIDE 10 MG/1
TABLET ORAL
COMMUNITY
Start: 2023-10-31

## 2023-11-26 RX ORDER — ACETAMINOPHEN 650 MG/1
650 SUPPOSITORY RECTAL EVERY 6 HOURS PRN
Status: DISCONTINUED | OUTPATIENT
Start: 2023-11-26 | End: 2023-11-29 | Stop reason: HOSPADM

## 2023-11-26 RX ORDER — AMLODIPINE BESYLATE 5 MG/1
2.5 TABLET ORAL DAILY
Status: DISCONTINUED | OUTPATIENT
Start: 2023-11-27 | End: 2023-11-29

## 2023-11-26 RX ORDER — MAGNESIUM SULFATE HEPTAHYDRATE 40 MG/ML
2000 INJECTION, SOLUTION INTRAVENOUS ONCE
Status: COMPLETED | OUTPATIENT
Start: 2023-11-26 | End: 2023-11-26

## 2023-11-26 RX ORDER — SODIUM CHLORIDE 9 MG/ML
INJECTION, SOLUTION INTRAVENOUS CONTINUOUS
Status: DISPENSED | OUTPATIENT
Start: 2023-11-26 | End: 2023-11-28

## 2023-11-26 RX ORDER — ENOXAPARIN SODIUM 100 MG/ML
40 INJECTION SUBCUTANEOUS DAILY
Status: DISCONTINUED | OUTPATIENT
Start: 2023-11-26 | End: 2023-11-29 | Stop reason: HOSPADM

## 2023-11-26 RX ORDER — DONEPEZIL HYDROCHLORIDE 5 MG/1
10 TABLET, FILM COATED ORAL NIGHTLY
Status: DISCONTINUED | OUTPATIENT
Start: 2023-11-26 | End: 2023-11-29 | Stop reason: HOSPADM

## 2023-11-26 RX ORDER — SODIUM CHLORIDE 0.9 % (FLUSH) 0.9 %
5-40 SYRINGE (ML) INJECTION EVERY 12 HOURS SCHEDULED
Status: DISCONTINUED | OUTPATIENT
Start: 2023-11-26 | End: 2023-11-29 | Stop reason: HOSPADM

## 2023-11-26 RX ORDER — LEVOFLOXACIN 5 MG/ML
750 INJECTION, SOLUTION INTRAVENOUS EVERY 24 HOURS
Status: DISCONTINUED | OUTPATIENT
Start: 2023-11-26 | End: 2023-11-29

## 2023-11-26 RX ORDER — ONDANSETRON 2 MG/ML
4 INJECTION INTRAMUSCULAR; INTRAVENOUS EVERY 6 HOURS PRN
Status: DISCONTINUED | OUTPATIENT
Start: 2023-11-26 | End: 2023-11-29 | Stop reason: HOSPADM

## 2023-11-26 RX ORDER — SODIUM CHLORIDE 0.9 % (FLUSH) 0.9 %
5-40 SYRINGE (ML) INJECTION PRN
Status: DISCONTINUED | OUTPATIENT
Start: 2023-11-26 | End: 2023-11-29 | Stop reason: HOSPADM

## 2023-11-26 RX ORDER — IPRATROPIUM BROMIDE AND ALBUTEROL SULFATE 2.5; .5 MG/3ML; MG/3ML
1 SOLUTION RESPIRATORY (INHALATION) EVERY 4 HOURS PRN
Status: DISCONTINUED | OUTPATIENT
Start: 2023-11-26 | End: 2023-11-29 | Stop reason: HOSPADM

## 2023-11-26 RX ORDER — SODIUM CHLORIDE, SODIUM LACTATE, POTASSIUM CHLORIDE, AND CALCIUM CHLORIDE .6; .31; .03; .02 G/100ML; G/100ML; G/100ML; G/100ML
500 INJECTION, SOLUTION INTRAVENOUS ONCE
Status: COMPLETED | OUTPATIENT
Start: 2023-11-26 | End: 2023-11-26

## 2023-11-26 RX ORDER — ONDANSETRON 4 MG/1
4 TABLET, ORALLY DISINTEGRATING ORAL EVERY 8 HOURS PRN
Status: DISCONTINUED | OUTPATIENT
Start: 2023-11-26 | End: 2023-11-29 | Stop reason: HOSPADM

## 2023-11-26 RX ORDER — ACETAMINOPHEN 325 MG/1
650 TABLET ORAL EVERY 6 HOURS PRN
Status: DISCONTINUED | OUTPATIENT
Start: 2023-11-26 | End: 2023-11-29 | Stop reason: HOSPADM

## 2023-11-26 RX ORDER — SODIUM CHLORIDE 9 MG/ML
INJECTION, SOLUTION INTRAVENOUS PRN
Status: DISCONTINUED | OUTPATIENT
Start: 2023-11-26 | End: 2023-11-29 | Stop reason: HOSPADM

## 2023-11-26 RX ORDER — POLYETHYLENE GLYCOL 3350 17 G/17G
17 POWDER, FOR SOLUTION ORAL DAILY PRN
Status: DISCONTINUED | OUTPATIENT
Start: 2023-11-26 | End: 2023-11-29 | Stop reason: HOSPADM

## 2023-11-26 RX ORDER — OMEPRAZOLE 20 MG/1
CAPSULE, DELAYED RELEASE ORAL
COMMUNITY
Start: 2023-10-31

## 2023-11-26 RX ORDER — SODIUM CHLORIDE, SODIUM LACTATE, POTASSIUM CHLORIDE, AND CALCIUM CHLORIDE .6; .31; .03; .02 G/100ML; G/100ML; G/100ML; G/100ML
1000 INJECTION, SOLUTION INTRAVENOUS ONCE
Status: COMPLETED | OUTPATIENT
Start: 2023-11-26 | End: 2023-11-26

## 2023-11-26 RX ORDER — LEVOTHYROXINE SODIUM 0.05 MG/1
50 TABLET ORAL
Status: DISCONTINUED | OUTPATIENT
Start: 2023-11-27 | End: 2023-11-29 | Stop reason: HOSPADM

## 2023-11-26 RX ORDER — ASPIRIN 325 MG
325 TABLET ORAL DAILY
Status: DISCONTINUED | OUTPATIENT
Start: 2023-11-27 | End: 2023-11-29 | Stop reason: HOSPADM

## 2023-11-26 RX ORDER — DONEPEZIL HYDROCHLORIDE 10 MG/1
TABLET, FILM COATED ORAL
COMMUNITY
Start: 2023-10-31

## 2023-11-26 RX ORDER — METOPROLOL SUCCINATE 100 MG/1
TABLET, EXTENDED RELEASE ORAL
COMMUNITY
Start: 2023-10-31

## 2023-11-26 RX ORDER — PANTOPRAZOLE SODIUM 40 MG/1
40 TABLET, DELAYED RELEASE ORAL
Status: DISCONTINUED | OUTPATIENT
Start: 2023-11-27 | End: 2023-11-29 | Stop reason: HOSPADM

## 2023-11-26 RX ORDER — IPRATROPIUM BROMIDE AND ALBUTEROL SULFATE 2.5; .5 MG/3ML; MG/3ML
1 SOLUTION RESPIRATORY (INHALATION)
Status: COMPLETED | OUTPATIENT
Start: 2023-11-26 | End: 2023-11-26

## 2023-11-26 RX ADMIN — ENOXAPARIN SODIUM 40 MG: 100 INJECTION SUBCUTANEOUS at 18:06

## 2023-11-26 RX ADMIN — IPRATROPIUM BROMIDE AND ALBUTEROL SULFATE 1 DOSE: 2.5; .5 SOLUTION RESPIRATORY (INHALATION) at 10:52

## 2023-11-26 RX ADMIN — SODIUM CHLORIDE, POTASSIUM CHLORIDE, SODIUM LACTATE AND CALCIUM CHLORIDE 1000 ML: 600; 310; 30; 20 INJECTION, SOLUTION INTRAVENOUS at 11:34

## 2023-11-26 RX ADMIN — SODIUM CHLORIDE, PRESERVATIVE FREE 10 ML: 5 INJECTION INTRAVENOUS at 22:59

## 2023-11-26 RX ADMIN — SODIUM CHLORIDE, POTASSIUM CHLORIDE, SODIUM LACTATE AND CALCIUM CHLORIDE 500 ML: 600; 310; 30; 20 INJECTION, SOLUTION INTRAVENOUS at 15:38

## 2023-11-26 RX ADMIN — ALBUTEROL SULFATE 2.5 MG: 2.5 SOLUTION RESPIRATORY (INHALATION) at 11:40

## 2023-11-26 RX ADMIN — ALBUTEROL SULFATE 2.5 MG: 2.5 SOLUTION RESPIRATORY (INHALATION) at 15:49

## 2023-11-26 RX ADMIN — WATER 80 MG: 1 INJECTION INTRAMUSCULAR; INTRAVENOUS; SUBCUTANEOUS at 18:05

## 2023-11-26 RX ADMIN — DONEPEZIL HYDROCHLORIDE 10 MG: 5 TABLET, FILM COATED ORAL at 22:58

## 2023-11-26 RX ADMIN — MAGNESIUM SULFATE HEPTAHYDRATE 2000 MG: 40 INJECTION, SOLUTION INTRAVENOUS at 15:39

## 2023-11-26 RX ADMIN — POTASSIUM BICARBONATE 40 MEQ: 782 TABLET, EFFERVESCENT ORAL at 16:23

## 2023-11-26 RX ADMIN — LEVOFLOXACIN 750 MG: 5 INJECTION, SOLUTION INTRAVENOUS at 15:39

## 2023-11-26 ASSESSMENT — PAIN - FUNCTIONAL ASSESSMENT: PAIN_FUNCTIONAL_ASSESSMENT: NONE - DENIES PAIN

## 2023-11-26 NOTE — H&P
History & Physical    Patient: Cynthia Saleh MRN: [id number]  CSN: [id number]    YOB: 1940  Age: 82 y.o. Sex: male      DOA: 11/26/2023  Primary Care Provider:  ROSALEE Brady - NP      Assessment/Plan     Active Hospital Problems    Diagnosis     Community acquired pneumonia due to Chlamydia species [J16.0]     Moderate asthma with acute exacerbation [J45.901]     Dysphagia [R13.10]     Hypertension [I10]     Dementia ([address]) [F03.90]     Acquired hypothyroidism [E03.9]     DDD (degenerative disc disease), cervical [M50.30]      Cynthia Saleh is a 82 y.o. male  with past medical history of dementia, HTN, hypothyroidism, asthma, pneumonia in 2016, GERD who admitted for PNA. Admit to Tele    PNA: On IV Levaquin. Will f/u CXR,, Cx.,check CRP     Dyspnea: ON O2 support. Asthma exac: Taper steroids neb therapy prn. Severe dementia: Continue aricept. Fall and aspiration precaution. HTN : Continue home meds   Monitor BP     Hypothyroidism : Continue with synthroid. Check TSH    DVT/GI prophylaxis    Estimated length of stay :3 days. AC: I have had a discussion with patient's wife /his POA regarding code status. Particularly, described potential options in event of cardiac or respiratory arrest. I have explained what being full code entails, including cardiorespiratory resuscitation attempts with chest compressions, potential cariodioversion/ \" shocks\" as well as intubation. I have also explained Do not resuscitate which would mean we allow a natural death with out aggressive interventions. She states that her  is FULL CODE Total time of care: 16 min         Please note that this dictation was completed with [employer], the [employer] voice recognition software. Quite often unanticipated grammatical, syntax, homophones, and other interpretive errors are inadvertently transcribed by the computer software. Please disregard these errors.   Please excuse any errors that have

## 2023-11-26 NOTE — ED NOTES
TRANSFER - IN REPORT:    Verbal report received from [address] on Nacho Clonts  for routine progression of patient care      Report consisted of patient's Situation, Background, Assessment and   Recommendations(SBAR). Information from the following report(s) Nurse Handoff Report, Index, ED Encounter Summary, ED SBAR, Adult Overview, Intake/Output, and Recent Results was reviewed with the receiving nurse. Opportunity for questions and clarification was provided.            Ivonne Hernandez RN  11/26/23 1128

## 2023-11-26 NOTE — ED TRIAGE NOTES
[facility] and they gave [facility] atb said he has pneumonia.   This morning he is wheezing  and feeling worse and short of breath
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
## 2023-11-26 NOTE — ED NOTES
TRANSFER - OUT REPORT:    Verbal report given to [address] [place] [address] on Soraya Mcpherson  being transferred to  for routine progression of patient care       Report consisted of patient's Situation, Background, Assessment and   Recommendations(SBAR). Information from the following report(s) Nurse Handoff Report, Index, ED Encounter Summary, ED SBAR, Adult Overview, Intake/Output, MAR, and Recent Results was reviewed with the receiving nurse. [place] Fall Assessment:    Presents to emergency department  because of falls (Syncope, seizure, or loss of consciousness): No  Age > 70: Yes  Altered Mental Status, Intoxication with alcohol or substance confusion (Disorientation, impaired judgment, poor safety awaremess, or inability to follow instructions): No  Impaired Mobility: Ambulates or transfers with assistive devices or assistance; Unable to ambulate or transer.: Yes  Nursing Judgement: Yes          Lines:   Peripheral IV 11/26/23 Right Antecubital (Active)   Site Assessment Clean, dry & intact 11/26/23 1052       Peripheral IV 11/26/23 Left Antecubital (Active)        Opportunity for questions and clarification was provided.       Patient transported with:  Registered Nurse          Tru Albarran RN  11/26/23 [id number]

## 2023-11-27 PROBLEM — J18.9 CAP (COMMUNITY ACQUIRED PNEUMONIA): Status: ACTIVE | Noted: 2023-11-27

## 2023-11-27 PROBLEM — J16.0 CAP (COMMUNITY ACQUIRED PNEUMONIA) DUE TO CHLAMYDIA SPECIES: Status: RESOLVED | Noted: 2023-11-26 | Resolved: 2023-11-27

## 2023-11-27 PROBLEM — J16.0 COMMUNITY ACQUIRED PNEUMONIA DUE TO CHLAMYDIA SPECIES: Status: RESOLVED | Noted: 2023-11-26 | Resolved: 2023-11-27

## 2023-11-27 LAB
ALBUMIN SERPL-MCNC: 2.5 G/DL (ref 3.4–5)
ALBUMIN/GLOB SERPL: 0.8 (ref 0.8–1.7)
ALP SERPL-CCNC: 138 U/L (ref 45–117)
ALT SERPL-CCNC: 64 U/L (ref 16–61)
ANION GAP SERPL CALC-SCNC: 5 MMOL/L (ref 3–18)
AST SERPL-CCNC: 37 U/L (ref 10–38)
BASOPHILS # BLD: 0 K/UL (ref 0–0.1)
BASOPHILS NFR BLD: 0 % (ref 0–2)
BILIRUB DIRECT SERPL-MCNC: 0.2 MG/DL (ref 0–0.2)
BILIRUB SERPL-MCNC: 0.3 MG/DL (ref 0.2–1)
BUN SERPL-MCNC: 14 MG/DL (ref 7–18)
BUN/CREAT SERPL: 18 (ref 12–20)
CALCIUM SERPL-MCNC: 8.2 MG/DL (ref 8.5–10.1)
CHLORIDE SERPL-SCNC: 111 MMOL/L (ref 100–111)
CO2 SERPL-SCNC: 29 MMOL/L (ref 21–32)
CREAT SERPL-MCNC: 0.79 MG/DL (ref 0.6–1.3)
DIFFERENTIAL METHOD BLD: ABNORMAL
EOSINOPHIL # BLD: 0 K/UL (ref 0–0.4)
EOSINOPHIL NFR BLD: 0 % (ref 0–5)
ERYTHROCYTE [DISTWIDTH] IN BLOOD BY AUTOMATED COUNT: 13.3 % (ref 11.6–14.5)
GLOBULIN SER CALC-MCNC: 3.3 G/DL (ref 2–4)
GLUCOSE BLD STRIP.AUTO-MCNC: 121 MG/DL (ref 70–110)
GLUCOSE SERPL-MCNC: 141 MG/DL (ref 74–99)
HCT VFR BLD AUTO: 43.3 % (ref 36–48)
HGB BLD-MCNC: 13.8 G/DL (ref 13–16)
IMM GRANULOCYTES # BLD AUTO: 0.1 K/UL (ref 0–0.04)
IMM GRANULOCYTES NFR BLD AUTO: 2 % (ref 0–0.5)
LACTATE SERPL-SCNC: 1.5 MMOL/L (ref 0.4–2)
LYMPHOCYTES # BLD: 1 K/UL (ref 0.9–3.6)
LYMPHOCYTES NFR BLD: 16 % (ref 21–52)
MCH RBC QN AUTO: 29.7 PG (ref 24–34)
MCHC RBC AUTO-ENTMCNC: 31.9 G/DL (ref 31–37)
MCV RBC AUTO: 93.1 FL (ref 78–100)
MONOCYTES # BLD: 0.3 K/UL (ref 0.05–1.2)
MONOCYTES NFR BLD: 5 % (ref 3–10)
NEUTS SEG # BLD: 4.9 K/UL (ref 1.8–8)
NEUTS SEG NFR BLD: 77 % (ref 40–73)
NRBC # BLD: 0 K/UL (ref 0–0.01)
NRBC BLD-RTO: 0 PER 100 WBC
PLATELET # BLD AUTO: 145 K/UL (ref 135–420)
PMV BLD AUTO: 9.9 FL (ref 9.2–11.8)
POTASSIUM SERPL-SCNC: 4.2 MMOL/L (ref 3.5–5.5)
PROCALCITONIN SERPL-MCNC: <0.05 NG/ML
PROT SERPL-MCNC: 5.8 G/DL (ref 6.4–8.2)
RBC # BLD AUTO: 4.65 M/UL (ref 4.35–5.65)
SODIUM SERPL-SCNC: 145 MMOL/L (ref 136–145)
TSH SERPL DL<=0.05 MIU/L-ACNC: 0.47 UIU/ML (ref 0.36–3.74)
WBC # BLD AUTO: 6.3 K/UL (ref 4.6–13.2)

## 2023-11-27 PROCEDURE — 84145 PROCALCITONIN (PCT): CPT

## 2023-11-27 PROCEDURE — 84443 ASSAY THYROID STIM HORMONE: CPT

## 2023-11-27 PROCEDURE — 80048 BASIC METABOLIC PNL TOTAL CA: CPT

## 2023-11-27 PROCEDURE — 80076 HEPATIC FUNCTION PANEL: CPT

## 2023-11-27 PROCEDURE — 97116 GAIT TRAINING THERAPY: CPT

## 2023-11-27 PROCEDURE — 97535 SELF CARE MNGMENT TRAINING: CPT

## 2023-11-27 PROCEDURE — 83605 ASSAY OF LACTIC ACID: CPT

## 2023-11-27 PROCEDURE — 97162 PT EVAL MOD COMPLEX 30 MIN: CPT

## 2023-11-27 PROCEDURE — 92610 EVALUATE SWALLOWING FUNCTION: CPT

## 2023-11-27 PROCEDURE — 97165 OT EVAL LOW COMPLEX 30 MIN: CPT

## 2023-11-27 PROCEDURE — 6360000002 HC RX W HCPCS: Performed by: EMERGENCY MEDICINE

## 2023-11-27 PROCEDURE — 1100000003 HC PRIVATE W/ TELEMETRY

## 2023-11-27 PROCEDURE — 36415 COLL VENOUS BLD VENIPUNCTURE: CPT

## 2023-11-27 PROCEDURE — 82962 GLUCOSE BLOOD TEST: CPT

## 2023-11-27 PROCEDURE — 6360000002 HC RX W HCPCS: Performed by: FAMILY MEDICINE

## 2023-11-27 PROCEDURE — 85025 COMPLETE CBC W/AUTO DIFF WBC: CPT

## 2023-11-27 PROCEDURE — 6370000000 HC RX 637 (ALT 250 FOR IP): Performed by: FAMILY MEDICINE

## 2023-11-27 PROCEDURE — 2580000003 HC RX 258: Performed by: FAMILY MEDICINE

## 2023-11-27 RX ADMIN — LEVOTHYROXINE SODIUM 50 MCG: 0.05 TABLET ORAL at 05:50

## 2023-11-27 RX ADMIN — ASPIRIN 325 MG: 325 TABLET ORAL at 10:15

## 2023-11-27 RX ADMIN — LEVOFLOXACIN 750 MG: 5 INJECTION, SOLUTION INTRAVENOUS at 16:00

## 2023-11-27 RX ADMIN — SODIUM CHLORIDE, PRESERVATIVE FREE 10 ML: 5 INJECTION INTRAVENOUS at 21:02

## 2023-11-27 RX ADMIN — AMLODIPINE BESYLATE 2.5 MG: 5 TABLET ORAL at 10:15

## 2023-11-27 RX ADMIN — PANTOPRAZOLE SODIUM 40 MG: 40 TABLET, DELAYED RELEASE ORAL at 05:49

## 2023-11-27 RX ADMIN — WATER 80 MG: 1 INJECTION INTRAMUSCULAR; INTRAVENOUS; SUBCUTANEOUS at 05:49

## 2023-11-27 RX ADMIN — WATER 80 MG: 1 INJECTION INTRAMUSCULAR; INTRAVENOUS; SUBCUTANEOUS at 17:55

## 2023-11-27 RX ADMIN — DONEPEZIL HYDROCHLORIDE 10 MG: 5 TABLET, FILM COATED ORAL at 21:00

## 2023-11-27 RX ADMIN — ENOXAPARIN SODIUM 40 MG: 100 INJECTION SUBCUTANEOUS at 10:15

## 2023-11-27 ASSESSMENT — PAIN SCALES - GENERAL
PAINLEVEL_OUTOF10: 0

## 2023-11-28 LAB
ANION GAP SERPL CALC-SCNC: 8 MMOL/L (ref 3–18)
BASOPHILS # BLD: 0 K/UL (ref 0–0.1)
BASOPHILS NFR BLD: 0 % (ref 0–2)
BUN SERPL-MCNC: 18 MG/DL (ref 7–18)
BUN/CREAT SERPL: 22 (ref 12–20)
CALCIUM SERPL-MCNC: 8.1 MG/DL (ref 8.5–10.1)
CHLORIDE SERPL-SCNC: 110 MMOL/L (ref 100–111)
CO2 SERPL-SCNC: 25 MMOL/L (ref 21–32)
CREAT SERPL-MCNC: 0.83 MG/DL (ref 0.6–1.3)
DIFFERENTIAL METHOD BLD: ABNORMAL
EOSINOPHIL # BLD: 0 K/UL (ref 0–0.4)
EOSINOPHIL NFR BLD: 0 % (ref 0–5)
ERYTHROCYTE [DISTWIDTH] IN BLOOD BY AUTOMATED COUNT: 13.3 % (ref 11.6–14.5)
GLUCOSE SERPL-MCNC: 168 MG/DL (ref 74–99)
HCT VFR BLD AUTO: 42.6 % (ref 36–48)
HGB BLD-MCNC: 13.5 G/DL (ref 13–16)
IMM GRANULOCYTES # BLD AUTO: 0.1 K/UL (ref 0–0.04)
IMM GRANULOCYTES NFR BLD AUTO: 2 % (ref 0–0.5)
LYMPHOCYTES # BLD: 0.9 K/UL (ref 0.9–3.6)
LYMPHOCYTES NFR BLD: 15 % (ref 21–52)
MCH RBC QN AUTO: 29.3 PG (ref 24–34)
MCHC RBC AUTO-ENTMCNC: 31.7 G/DL (ref 31–37)
MCV RBC AUTO: 92.4 FL (ref 78–100)
MONOCYTES # BLD: 0.2 K/UL (ref 0.05–1.2)
MONOCYTES NFR BLD: 3 % (ref 3–10)
NEUTS SEG # BLD: 5 K/UL (ref 1.8–8)
NEUTS SEG NFR BLD: 81 % (ref 40–73)
NRBC # BLD: 0 K/UL (ref 0–0.01)
NRBC BLD-RTO: 0 PER 100 WBC
PLATELET # BLD AUTO: 150 K/UL (ref 135–420)
PMV BLD AUTO: 10 FL (ref 9.2–11.8)
POTASSIUM SERPL-SCNC: 3.9 MMOL/L (ref 3.5–5.5)
RBC # BLD AUTO: 4.61 M/UL (ref 4.35–5.65)
SODIUM SERPL-SCNC: 143 MMOL/L (ref 136–145)
WBC # BLD AUTO: 6.2 K/UL (ref 4.6–13.2)

## 2023-11-28 PROCEDURE — 6360000002 HC RX W HCPCS: Performed by: EMERGENCY MEDICINE

## 2023-11-28 PROCEDURE — 80048 BASIC METABOLIC PNL TOTAL CA: CPT

## 2023-11-28 PROCEDURE — 97116 GAIT TRAINING THERAPY: CPT

## 2023-11-28 PROCEDURE — 36415 COLL VENOUS BLD VENIPUNCTURE: CPT

## 2023-11-28 PROCEDURE — 6360000002 HC RX W HCPCS: Performed by: FAMILY MEDICINE

## 2023-11-28 PROCEDURE — 2580000003 HC RX 258: Performed by: FAMILY MEDICINE

## 2023-11-28 PROCEDURE — 1100000003 HC PRIVATE W/ TELEMETRY

## 2023-11-28 PROCEDURE — 6370000000 HC RX 637 (ALT 250 FOR IP): Performed by: FAMILY MEDICINE

## 2023-11-28 PROCEDURE — 85025 COMPLETE CBC W/AUTO DIFF WBC: CPT

## 2023-11-28 RX ADMIN — PANTOPRAZOLE SODIUM 40 MG: 40 TABLET, DELAYED RELEASE ORAL at 06:09

## 2023-11-28 RX ADMIN — WATER 80 MG: 1 INJECTION INTRAMUSCULAR; INTRAVENOUS; SUBCUTANEOUS at 06:07

## 2023-11-28 RX ADMIN — AMLODIPINE BESYLATE 2.5 MG: 5 TABLET ORAL at 10:14

## 2023-11-28 RX ADMIN — DONEPEZIL HYDROCHLORIDE 10 MG: 5 TABLET, FILM COATED ORAL at 19:59

## 2023-11-28 RX ADMIN — LEVOTHYROXINE SODIUM 50 MCG: 0.05 TABLET ORAL at 06:09

## 2023-11-28 RX ADMIN — ENOXAPARIN SODIUM 40 MG: 100 INJECTION SUBCUTANEOUS at 10:14

## 2023-11-28 RX ADMIN — WATER 80 MG: 1 INJECTION INTRAMUSCULAR; INTRAVENOUS; SUBCUTANEOUS at 17:31

## 2023-11-28 RX ADMIN — SODIUM CHLORIDE, PRESERVATIVE FREE 10 ML: 5 INJECTION INTRAVENOUS at 10:16

## 2023-11-28 RX ADMIN — LEVOFLOXACIN 750 MG: 5 INJECTION, SOLUTION INTRAVENOUS at 16:00

## 2023-11-28 RX ADMIN — ASPIRIN 325 MG: 325 TABLET ORAL at 10:14

## 2023-11-28 ASSESSMENT — PAIN SCALES - GENERAL
PAINLEVEL_OUTOF10: 0
PAINLEVEL_OUTOF10: 0

## 2023-11-28 NOTE — PLAN OF CARE
Problem: Discharge Planning  Goal: Discharge to home or other facility with appropriate resources  11/28/2023 0019 by Arline Banerjee RN  Outcome: Progressing  Flowsheets (Taken 11/27/2023 2000)  Discharge to home or other facility with appropriate resources:   Identify barriers to discharge with patient and caregiver   Identify discharge learning needs (meds, wound care, etc)   Arrange for needed discharge resources and transportation as appropriate  11/27/2023 1658 by Ashley Mujica RN  Outcome: Progressing     Problem: Skin/Tissue Integrity  Goal: Absence of new skin breakdown  Description: 1. Monitor for areas of redness and/or skin breakdown  2. Assess vascular access sites hourly  3. Every 4-6 hours minimum:  Change oxygen saturation probe site  4. Every 4-6 hours:  If on nasal continuous positive airway pressure, respiratory therapy assess nares and determine need for appliance change or resting period.   11/28/2023 0019 by Arline Banerjee RN  Outcome: Progressing  11/27/2023 1658 by Ashley Mujica RN  Outcome: Progressing     Problem: Safety - Adult  Goal: Free from fall injury  11/28/2023 0019 by Arline Banerjee RN  Outcome: Progressing  Flowsheets (Taken 11/27/2023 2000)  Free From Fall Injury:   Instruct family/caregiver on patient safety   Based on caregiver fall risk screen, instruct family/caregiver to ask for assistance with transferring infant if caregiver noted to have fall risk factors  11/27/2023 1658 by Ashley Mujica RN  Outcome: Progressing     Problem: ABCDS Injury Assessment  Goal: Absence of physical injury  11/28/2023 0019 by Arline Banerjee RN  Outcome: Progressing  Flowsheets (Taken 11/27/2023 2000)  Absence of Physical Injury: Implement safety measures based on patient assessment  11/27/2023 1658 by Ashley Mujica RN  Outcome: Progressing     Problem: Pain  Goal: Verbalizes/displays adequate comfort level or baseline comfort level  11/28/2023 0019 by Devi Carrasquillo

## 2023-11-29 VITALS
OXYGEN SATURATION: 97 % | SYSTOLIC BLOOD PRESSURE: 149 MMHG | HEART RATE: 85 BPM | RESPIRATION RATE: 18 BRPM | BODY MASS INDEX: 28.59 KG/M2 | TEMPERATURE: 97.7 F | HEIGHT: 66 IN | WEIGHT: 177.91 LBS | DIASTOLIC BLOOD PRESSURE: 85 MMHG

## 2023-11-29 LAB
ANION GAP SERPL CALC-SCNC: 5 MMOL/L (ref 3–18)
BASOPHILS # BLD: 0 K/UL (ref 0–0.1)
BASOPHILS NFR BLD: 0 % (ref 0–2)
BUN SERPL-MCNC: 17 MG/DL (ref 7–18)
BUN/CREAT SERPL: 20 (ref 12–20)
CALCIUM SERPL-MCNC: 7.9 MG/DL (ref 8.5–10.1)
CHLORIDE SERPL-SCNC: 110 MMOL/L (ref 100–111)
CO2 SERPL-SCNC: 28 MMOL/L (ref 21–32)
CREAT SERPL-MCNC: 0.86 MG/DL (ref 0.6–1.3)
DIFFERENTIAL METHOD BLD: ABNORMAL
EOSINOPHIL # BLD: 0 K/UL (ref 0–0.4)
EOSINOPHIL NFR BLD: 0 % (ref 0–5)
ERYTHROCYTE [DISTWIDTH] IN BLOOD BY AUTOMATED COUNT: 13.1 % (ref 11.6–14.5)
GLUCOSE SERPL-MCNC: 142 MG/DL (ref 74–99)
HCT VFR BLD AUTO: 42.2 % (ref 36–48)
HGB BLD-MCNC: 13.5 G/DL (ref 13–16)
IMM GRANULOCYTES # BLD AUTO: 0.2 K/UL (ref 0–0.04)
IMM GRANULOCYTES NFR BLD AUTO: 2 % (ref 0–0.5)
LYMPHOCYTES # BLD: 1.1 K/UL (ref 0.9–3.6)
LYMPHOCYTES NFR BLD: 12 % (ref 21–52)
MCH RBC QN AUTO: 29.4 PG (ref 24–34)
MCHC RBC AUTO-ENTMCNC: 32 G/DL (ref 31–37)
MCV RBC AUTO: 91.9 FL (ref 78–100)
MONOCYTES # BLD: 0.5 K/UL (ref 0.05–1.2)
MONOCYTES NFR BLD: 6 % (ref 3–10)
NEUTS SEG # BLD: 7.3 K/UL (ref 1.8–8)
NEUTS SEG NFR BLD: 80 % (ref 40–73)
NRBC # BLD: 0 K/UL (ref 0–0.01)
NRBC BLD-RTO: 0 PER 100 WBC
PLATELET # BLD AUTO: 166 K/UL (ref 135–420)
PMV BLD AUTO: 10 FL (ref 9.2–11.8)
POTASSIUM SERPL-SCNC: 3.6 MMOL/L (ref 3.5–5.5)
RBC # BLD AUTO: 4.59 M/UL (ref 4.35–5.65)
SODIUM SERPL-SCNC: 143 MMOL/L (ref 136–145)
WBC # BLD AUTO: 9.1 K/UL (ref 4.6–13.2)

## 2023-11-29 PROCEDURE — 6370000000 HC RX 637 (ALT 250 FOR IP): Performed by: FAMILY MEDICINE

## 2023-11-29 PROCEDURE — 6360000002 HC RX W HCPCS: Performed by: FAMILY MEDICINE

## 2023-11-29 PROCEDURE — 94640 AIRWAY INHALATION TREATMENT: CPT

## 2023-11-29 PROCEDURE — 97116 GAIT TRAINING THERAPY: CPT

## 2023-11-29 PROCEDURE — 6370000000 HC RX 637 (ALT 250 FOR IP): Performed by: EMERGENCY MEDICINE

## 2023-11-29 PROCEDURE — 80048 BASIC METABOLIC PNL TOTAL CA: CPT

## 2023-11-29 PROCEDURE — 2580000003 HC RX 258: Performed by: FAMILY MEDICINE

## 2023-11-29 PROCEDURE — 36415 COLL VENOUS BLD VENIPUNCTURE: CPT

## 2023-11-29 PROCEDURE — 85025 COMPLETE CBC W/AUTO DIFF WBC: CPT

## 2023-11-29 RX ORDER — PREDNISONE 10 MG/1
TABLET ORAL
Qty: 10 TABLET | Refills: 0
Start: 2023-11-29

## 2023-11-29 RX ORDER — IPRATROPIUM BROMIDE AND ALBUTEROL SULFATE 2.5; .5 MG/3ML; MG/3ML
3 SOLUTION RESPIRATORY (INHALATION) EVERY 4 HOURS PRN
Qty: 360 ML | Refills: 0
Start: 2023-11-29

## 2023-11-29 RX ORDER — AMLODIPINE BESYLATE 5 MG/1
5 TABLET ORAL DAILY
Status: DISCONTINUED | OUTPATIENT
Start: 2023-11-30 | End: 2023-11-29 | Stop reason: HOSPADM

## 2023-11-29 RX ORDER — AMLODIPINE BESYLATE 5 MG/1
5 TABLET ORAL DAILY
Qty: 30 TABLET | Refills: 0
Start: 2023-11-30

## 2023-11-29 RX ORDER — LEVOFLOXACIN 750 MG/1
750 TABLET, FILM COATED ORAL DAILY
Qty: 5 TABLET | Refills: 0
Start: 2023-11-29 | End: 2023-12-04

## 2023-11-29 RX ORDER — HYDRALAZINE HYDROCHLORIDE 20 MG/ML
10 INJECTION INTRAMUSCULAR; INTRAVENOUS EVERY 6 HOURS PRN
Status: DISCONTINUED | OUTPATIENT
Start: 2023-11-29 | End: 2023-11-29 | Stop reason: HOSPADM

## 2023-11-29 RX ADMIN — IPRATROPIUM BROMIDE AND ALBUTEROL SULFATE 1 DOSE: .5; 2.5 SOLUTION RESPIRATORY (INHALATION) at 05:59

## 2023-11-29 RX ADMIN — WATER 80 MG: 1 INJECTION INTRAMUSCULAR; INTRAVENOUS; SUBCUTANEOUS at 16:07

## 2023-11-29 RX ADMIN — ASPIRIN 325 MG: 325 TABLET ORAL at 09:12

## 2023-11-29 RX ADMIN — LEVOFLOXACIN 750 MG: 500 TABLET, FILM COATED ORAL at 15:30

## 2023-11-29 RX ADMIN — AMLODIPINE BESYLATE 2.5 MG: 5 TABLET ORAL at 09:13

## 2023-11-29 RX ADMIN — ENOXAPARIN SODIUM 40 MG: 100 INJECTION SUBCUTANEOUS at 09:13

## 2023-11-29 RX ADMIN — WATER 80 MG: 1 INJECTION INTRAMUSCULAR; INTRAVENOUS; SUBCUTANEOUS at 05:46

## 2023-11-29 RX ADMIN — LEVOTHYROXINE SODIUM 50 MCG: 0.05 TABLET ORAL at 05:46

## 2023-11-29 RX ADMIN — SODIUM CHLORIDE, PRESERVATIVE FREE 10 ML: 5 INJECTION INTRAVENOUS at 09:22

## 2023-11-29 RX ADMIN — PANTOPRAZOLE SODIUM 40 MG: 40 TABLET, DELAYED RELEASE ORAL at 05:46

## 2023-11-29 NOTE — PLAN OF CARE
Problem: Discharge Planning  Goal: Discharge to home or other facility with appropriate resources  11/29/2023 1742 by Jeannine Castillo RN  Outcome: Adequate for Discharge  11/29/2023 1110 by Jeannine Castillo RN  Outcome: Progressing     Problem: Skin/Tissue Integrity  Goal: Absence of new skin breakdown  Description: 1. Monitor for areas of redness and/or skin breakdown  2. Assess vascular access sites hourly  3. Every 4-6 hours minimum:  Change oxygen saturation probe site  4. Every 4-6 hours:  If on nasal continuous positive airway pressure, respiratory therapy assess nares and determine need for appliance change or resting period.   11/29/2023 1742 by Jeannine Castillo RN  Outcome: Adequate for Discharge  11/29/2023 1110 by Jeannine Castillo RN  Outcome: Progressing     Problem: Safety - Adult  Goal: Free from fall injury  11/29/2023 1742 by Jeannine Castillo RN  Outcome: Adequate for Discharge  11/29/2023 1110 by Jeannine Castillo RN  Outcome: Progressing     Problem: ABCDS Injury Assessment  Goal: Absence of physical injury  11/29/2023 1742 by Jeannine Castillo RN  Outcome: Adequate for Discharge  11/29/2023 1110 by Jeannine Castillo RN  Outcome: Progressing     Problem: Pain  Goal: Verbalizes/displays adequate comfort level or baseline comfort level  11/29/2023 1742 by Jeannine Castillo RN  Outcome: Adequate for Discharge  11/29/2023 1110 by Jeannine Castillo RN  Outcome: Progressing

## 2023-12-22 NOTE — PROGRESS NOTES
PT DAILY TREATMENT NOTE    Patient Name: Selene Evangelista  Date:2019  : 1940  [x]  Patient  Verified  Payor: Gonzalez Arroyo / Plan: [place] MED[place]ARE PART A & B / Product Type: Medicare /    In time:2:00  Out time:2:30  Total Treatment Time (min): 30  Total Timed Codes (min): 30  1:1 Treatment Time ([facility] only): 30   Visit #: 4 of 24    Treatment Area: Low back pain [M54.5]    SUBJECTIVE  Pain Level (0-10 scale): 3  Any medication changes, allergies to medications, adverse drug reactions, diagnosis change, or new procedure performed?: [x] No    [] Yes (see summary sheet for update)  Subjective functional status/changes:   [] No changes reported  Feels alright. No new issues. OBJECTIVE    30 min Therapeutic Exercise:  [x] See flow sheet :   Rationale: increase ROM, increase strength and decrease pain to improve the patients ability to complete ADLs       With   [] TE   [] TA   [] neuro   [] other: Patient Education: [x] Review HEP    [] Progressed/Changed HEP based on:   [] positioning   [] body mechanics   [] transfers   [] heat/ice application    [] other:      Other Objective/Functional Measures: NA     Pain Level (0-10 scale) post treatment: 0    ASSESSMENT/Changes in Function: Patient responds well to treatment session. Patient is challenged by exercises as prescribed.  . No adverse effects were noted from today's treatment session       Patient will continue to benefit from skilled PT services to modify and progress therapeutic interventions, address functional mobility deficits, address ROM deficits, address strength deficits,  analyze and cue movement patterns, analyze and modify body mechanics/ergonomics, assess and modify postural abnormalities, address imbalance/dizziness and instruct in home and community integration to attain remaining goals      []  See Plan of Care  []  See progress note/recertification  []  See Discharge Summary         Progress towards goals / Updated goals:  Short Term Goals: To be accomplished in 2 weeks:                 CCAAJSR will report compliance with HEP 1x/day to aid in rehabilitation program.                 Status at IE: NA                 Current:Initiated HEP 05/16/2019.        Long Term Goals: To be accomplished in 4 weeks:                 Patient will increase B LE strength to 4/5 MMT throughout to aid in completion of ADLs.                Status at IE:3+/5                 Current: Same as IE                    DAXIFPG will report pain no greater than 0-2/10 throughout entire day to aid in completion of ADLs.                Status at IE:0-8                 Current:3/10 at least, progressing 5/24/2019                    Patent will be able to stand for 15 mins to be able to complete ADLs.                Status at IE:5 minutes                 Current:Same as IE                    Patient will improve FOTO score to 65 points to demonstrate improvement in functional status.                  Status at IE:11                 MHGBDWQ: Same as IE        PLAN  []  Upgrade activities as tolerated     [x]  Continue plan of care  []  Update interventions per flow sheet       []  Discharge due to:_  []  Other:_      Lissy Steiner PT, DPT 5/24/2019  2:16 PM    Future Appointments   Date Time Provider Caprice Leal   5/28/2019  2:00 PM Dartha Clamp, PT [facility]   6/5/2019  1:30 PM Dartha Clamp, PT [facility]   6/7/2019 11:30 AM Annabelle Brooks, PT, DPT [facility]   6/10/2019 11:00 AM Dartha Clamp, PT [facility]   6/12/2019 11:00 AM Dartha Clamp, PT [facility]   6/17/2019 11:00 AM Dartha Clamp, PT [facility]   6/19/2019 11:00 AM Dartha Clamp, PT [facility]   6/24/2019 11:00 AM D[facility] Clamp, [facility]   6/26/2019 11:00 AM Dartha Clamp, PT [facility] [Negative] : Gastrointestinal

## 2024-01-18 ENCOUNTER — HOME HEALTH ADMISSION (OUTPATIENT)
Age: 84
End: 2024-01-18

## 2024-01-20 ENCOUNTER — HOME CARE VISIT (OUTPATIENT)
Age: 84
End: 2024-01-20

## 2024-01-22 ENCOUNTER — HOME CARE VISIT (OUTPATIENT)
Age: 84
End: 2024-01-22

## 2024-01-24 ENCOUNTER — HOME CARE VISIT (OUTPATIENT)
Age: 84
End: 2024-01-24

## 2025-03-03 NOTE — PROGRESS NOTES
Assumed care of patient at this time. Pt alert and oriented times 3, disorient to day of year. No complaints of chest pain, SOB, numbness or tingling at this time. Pt had cervical collar in place to posterior neck with ABD pad and hemovac draining. CDI. Pt has urinary frequency, but continue to use urinal. Pt given instructions on incentive spirometer and demonstrated use. 18G Left Hand IVF infusing as ordered. Pt sitting in chair at bedside. Call bell, telephone, and personal belongings within reach. Family at bedside.      12 Removed old dressing and hemovac and applied new dressing with cervical collar in place. TRANSFER - OUT REPORT:    Verbal report given to [address] RN(name) on [facility]  being transferred to [facility] rehabilitation (unit) for routine progression of care       Report consisted of patients Situation, Background, Assessment and   Recommendations(SBAR). Information from the following report(s) SBAR, Kardex, Intake/Output and MAR was reviewed with the receiving nurse. Lines:       Opportunity for questions and clarification was provided.       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 Hx of breast cancer. Follows with Dr. Reagan and Dr. Zhang. Phone call to Awilda. Reviewed role of nurse navigator. Per Awidla she is doing well. Up to date on breast imaging. Reviewed possible pancreatic screening due to PATTI+. Awilda is not interested in pursing at this time. Instructed Awilda to call this navigator with any questions or concerns. Understanding verbalized.     Toya Limon, ALMITAN, RN, OCN  Nurse Navigator [facility]   [facility]  [phone] (p)  [phone] (fax)

## (undated) DEVICE — SINGLE PORT MANIFOLD: Brand: NEPTUNE 2

## (undated) DEVICE — STERILE POLYISOPRENE POWDER-FREE SURGICAL GLOVES: Brand: PROTEXIS

## (undated) DEVICE — Device

## (undated) DEVICE — ROUND DISSECTORS: Brand: DEROYAL

## (undated) DEVICE — BIPOLAR SEALER 23-314-1 AQM MINI EVS 3.4: Brand: AQUAMANTYS™

## (undated) DEVICE — 3.0MM PRECISION NEURO (MATCH HEAD)

## (undated) DEVICE — DRESSING PETRO W3XL8IN N ADH OIL EMUL GZ CURAD

## (undated) DEVICE — SHEET,DRAPE,70X100,STERILE: Brand: MEDLINE

## (undated) DEVICE — SHEET,DRAPE,40X58,STERILE: Brand: MEDLINE

## (undated) DEVICE — MEDI-VAC NON-CONDUCTIVE SUCTION TUBING: Brand: CARDINAL HEALTH

## (undated) DEVICE — BASIC SINGLE BASIN 1-LF: Brand: MEDLINE INDUSTRIES, INC.

## (undated) DEVICE — SYRINGE BLB 50CC IRRIG PLIABLE FNGR FLNG GRAD FLSK DISP

## (undated) DEVICE — REM POLYHESIVE ADULT PATIENT RETURN ELECTRODE: Brand: VALLEYLAB

## (undated) DEVICE — SOL IRRIGATION INJ NACL 0.9% 500ML BTL

## (undated) DEVICE — PLUS HANDPIECE WITH SPRAY TIP: Brand: SURGILAV

## (undated) DEVICE — DRAIN KT WND 10FR RND 400ML --

## (undated) DEVICE — KENDALL SCD EXPRESS SLEEVES, KNEE LENGTH, MEDIUM: Brand: KENDALL SCD

## (undated) DEVICE — 1010 S-DRAPE TOWEL DRAPE 10/BX: Brand: STERI-DRAPE™

## (undated) DEVICE — SOLUTION LACTATED RINGERS INJECTION USP

## (undated) DEVICE — PREP SKN PREVAIL 40ML APPL --

## (undated) DEVICE — Z DISCONTINUED USE (MFG CAT 7984-37) SOLUTION IV SODIUM CHL 0.9% 100 ML INJ

## (undated) DEVICE — BIT DRL L65MM DIA2.4MM STP QUIK CPL REUSE

## (undated) DEVICE — TRAY CATH 16FR DRN BG LF -- CONVERT TO ITEM 363158

## (undated) DEVICE — SUTURE VCRL + SZ 2-0 L18IN ABSRB VLT CT-2 1/2 CIR TAPERCUT VCP726D

## (undated) DEVICE — GAUZE,SPONGE,8"X4",12PLY,XRAY,STRL,LF: Brand: MEDLINE

## (undated) DEVICE — SUTURE ABSORBABLE BRAIDED 1-0 OS-8 CR 3X18 IN UD VICRYL J757T